# Patient Record
Sex: FEMALE | Race: WHITE | NOT HISPANIC OR LATINO | URBAN - METROPOLITAN AREA
[De-identification: names, ages, dates, MRNs, and addresses within clinical notes are randomized per-mention and may not be internally consistent; named-entity substitution may affect disease eponyms.]

---

## 2017-10-24 ENCOUNTER — APPOINTMENT (OUTPATIENT)
Dept: URBAN - METROPOLITAN AREA CLINIC 200 | Age: 27
Setting detail: DERMATOLOGY
End: 2017-10-26

## 2017-10-24 DIAGNOSIS — L30.4 ERYTHEMA INTERTRIGO: ICD-10-CM

## 2017-10-24 PROBLEM — D23.5 OTHER BENIGN NEOPLASM OF SKIN OF TRUNK: Status: ACTIVE | Noted: 2017-10-24

## 2017-10-24 PROCEDURE — 99213 OFFICE O/P EST LOW 20 MIN: CPT

## 2017-10-24 PROCEDURE — OTHER COUNSELING: OTHER

## 2017-10-24 ASSESSMENT — LOCATION SIMPLE DESCRIPTION DERM: LOCATION SIMPLE: RIGHT BUTTOCK

## 2017-10-24 ASSESSMENT — LOCATION ZONE DERM: LOCATION ZONE: TRUNK

## 2017-10-24 ASSESSMENT — LOCATION DETAILED DESCRIPTION DERM: LOCATION DETAILED: RIGHT MEDIAL BUTTOCK

## 2018-04-17 ENCOUNTER — TRANSCRIBE ORDERS (OUTPATIENT)
Dept: SCHEDULING | Age: 28
End: 2018-04-17

## 2018-04-17 DIAGNOSIS — O35.10X0 CHROMOSOMAL ABNORMALITY IN FETUS, AFFECTING MANAGEMENT OF MOTHER, WITH DELIVERY: ICD-10-CM

## 2018-04-17 DIAGNOSIS — Z36.0 SCREENING FOR CHROMOSOMAL ANOMALIES BY AMNIOCENTESIS: Primary | ICD-10-CM

## 2018-04-18 ENCOUNTER — HOSPITAL ENCOUNTER (OUTPATIENT)
Dept: PERINATAL CARE | Facility: HOSPITAL | Age: 28
Discharge: HOME | End: 2018-04-18
Attending: OBSTETRICS & GYNECOLOGY
Payer: COMMERCIAL

## 2018-04-18 VITALS — BODY MASS INDEX: 19.16 KG/M2 | HEIGHT: 65 IN | WEIGHT: 115 LBS

## 2018-04-18 DIAGNOSIS — Z3A.13 13 WEEKS GESTATION OF PREGNANCY: ICD-10-CM

## 2018-04-18 DIAGNOSIS — O35.10X0 CHROMOSOMAL ABNORMALITY IN FETUS, AFFECTING MANAGEMENT OF MOTHER, WITH DELIVERY: ICD-10-CM

## 2018-04-18 DIAGNOSIS — Z36.0 ENCOUNTER FOR ANTENATAL SCREENING FOR CHROMOSOMAL ANOMALIES (CODE): Primary | ICD-10-CM

## 2018-04-18 LAB
D AG BLD QL: POSITIVE
EXTERNAL ABO: NORMAL
HBV SURFACE AG SER QL: NONREACTIVE
RPR SER QL: NORMAL
RUBELLA IGG SCREEN: NORMAL

## 2018-04-18 PROCEDURE — 76813 OB US NUCHAL MEAS 1 GEST: CPT | Mod: TC

## 2018-05-29 ENCOUNTER — HOSPITAL ENCOUNTER (EMERGENCY)
Facility: HOSPITAL | Age: 28
Discharge: HOME | End: 2018-05-29
Attending: EMERGENCY MEDICINE
Payer: COMMERCIAL

## 2018-05-29 VITALS
TEMPERATURE: 99.3 F | HEART RATE: 80 BPM | DIASTOLIC BLOOD PRESSURE: 50 MMHG | SYSTOLIC BLOOD PRESSURE: 103 MMHG | RESPIRATION RATE: 19 BRPM | OXYGEN SATURATION: 100 %

## 2018-05-29 DIAGNOSIS — Z3A.19 19 WEEKS GESTATION OF PREGNANCY: ICD-10-CM

## 2018-05-29 DIAGNOSIS — R11.2 NON-INTRACTABLE VOMITING WITH NAUSEA, UNSPECIFIED VOMITING TYPE: Primary | ICD-10-CM

## 2018-05-29 LAB
ALBUMIN SERPL-MCNC: 3.4 G/DL (ref 3.4–5)
ALP SERPL-CCNC: 42 IU/L (ref 35–126)
ALT SERPL-CCNC: 21 IU/L (ref 11–54)
ANION GAP SERPL CALC-SCNC: 9 MEQ/L (ref 3–15)
AST SERPL-CCNC: 32 IU/L (ref 15–41)
BASOPHILS # BLD: 0.01 K/UL (ref 0.01–0.1)
BASOPHILS NFR BLD: 0.2 %
BILIRUB SERPL-MCNC: 0.8 MG/DL (ref 0.3–1.2)
BILIRUB UR QL STRIP.AUTO: NEGATIVE MG/DL
BUN SERPL-MCNC: 12 MG/DL (ref 8–20)
CALCIUM SERPL-MCNC: 8.9 MG/DL (ref 8.9–10.3)
CHLORIDE SERPL-SCNC: 100 MMOL/L (ref 98–109)
CLARITY UR REFRACT.AUTO: CLEAR
CO2 SERPL-SCNC: 22 MMOL/L (ref 22–32)
COLOR UR AUTO: YELLOW
CREAT SERPL-MCNC: 0.6 MG/DL (ref 0.6–1.1)
DIFFERENTIAL METHOD BLD: ABNORMAL
EOSINOPHIL # BLD: 0 K/UL (ref 0.04–0.36)
EOSINOPHIL NFR BLD: 0 %
ERYTHROCYTE [DISTWIDTH] IN BLOOD BY AUTOMATED COUNT: 13.1 % (ref 11.7–14.4)
GFR SERPL CREATININE-BSD FRML MDRD: >60 ML/MIN/1.73M*2
GLUCOSE SERPL-MCNC: 106 MG/DL (ref 70–99)
GLUCOSE UR STRIP.AUTO-MCNC: ABNORMAL MG/DL
HCG SERPL-ACNC: ABNORMAL MIU/ML
HCT VFR BLDCO AUTO: 41.2 % (ref 35–45)
HGB BLD-MCNC: 14.3 G/DL (ref 11.8–15.7)
HGB UR QL STRIP.AUTO: NEGATIVE
IMM GRANULOCYTES # BLD AUTO: 0.02 K/UL (ref 0–0.08)
IMM GRANULOCYTES NFR BLD AUTO: 0.4 %
KETONES UR STRIP.AUTO-MCNC: NEGATIVE MG/DL
LEUKOCYTE ESTERASE UR QL STRIP.AUTO: NEGATIVE
LYMPHOCYTES # BLD: 0.18 K/UL (ref 1.2–3.5)
LYMPHOCYTES NFR BLD: 3.3 %
MCH RBC QN AUTO: 31.8 PG (ref 28–33.2)
MCHC RBC AUTO-ENTMCNC: 34.7 G/DL (ref 32.2–35.5)
MCV RBC AUTO: 91.8 FL (ref 83–98)
MONOCYTES # BLD: 0.2 K/UL (ref 0.28–0.8)
MONOCYTES NFR BLD: 3.7 %
NEUTROPHILS # BLD: 5.06 K/UL (ref 1.7–7)
NEUTS SEG NFR BLD: 92.4 %
NITRITE UR QL STRIP.AUTO: NEGATIVE
NRBC BLD-RTO: 0 %
PDW BLD AUTO: 10.1 FL (ref 9.4–12.3)
PH UR STRIP.AUTO: 6.5 [PH]
PLATELET # BLD AUTO: 160 K/UL (ref 150–369)
POTASSIUM SERPL-SCNC: 3.6 MMOL/L (ref 3.6–5.1)
PROT SERPL-MCNC: 6.5 G/DL (ref 6–8.2)
PROT UR QL STRIP.AUTO: NEGATIVE
RBC # BLD AUTO: 4.49 M/UL (ref 3.93–5.22)
SODIUM SERPL-SCNC: 131 MMOL/L (ref 136–144)
SP GR UR REFRACT.AUTO: 1.01
UROBILINOGEN UR STRIP-ACNC: 0.2 EU/DL
WBC # BLD AUTO: 5.47 K/UL (ref 3.8–10.5)

## 2018-05-29 PROCEDURE — 85025 COMPLETE CBC W/AUTO DIFF WBC: CPT

## 2018-05-29 PROCEDURE — 81003 URINALYSIS AUTO W/O SCOPE: CPT | Performed by: EMERGENCY MEDICINE

## 2018-05-29 PROCEDURE — 63600000 HC DRUGS/DETAIL CODE: Performed by: STUDENT IN AN ORGANIZED HEALTH CARE EDUCATION/TRAINING PROGRAM

## 2018-05-29 PROCEDURE — 85025 COMPLETE CBC W/AUTO DIFF WBC: CPT | Performed by: EMERGENCY MEDICINE

## 2018-05-29 PROCEDURE — 84702 CHORIONIC GONADOTROPIN TEST: CPT | Mod: GZ | Performed by: EMERGENCY MEDICINE

## 2018-05-29 PROCEDURE — 99284 EMERGENCY DEPT VISIT MOD MDM: CPT

## 2018-05-29 PROCEDURE — 25800000 HC PHARMACY IV SOLUTIONS: Performed by: STUDENT IN AN ORGANIZED HEALTH CARE EDUCATION/TRAINING PROGRAM

## 2018-05-29 PROCEDURE — 80053 COMPREHEN METABOLIC PANEL: CPT

## 2018-05-29 PROCEDURE — 80053 COMPREHEN METABOLIC PANEL: CPT | Performed by: EMERGENCY MEDICINE

## 2018-05-29 PROCEDURE — 36415 COLL VENOUS BLD VENIPUNCTURE: CPT

## 2018-05-29 RX ORDER — ONDANSETRON 4 MG/1
4 TABLET, FILM COATED ORAL
Qty: 12 TABLET | Refills: 0 | Status: SHIPPED | OUTPATIENT
Start: 2018-05-29 | End: 2018-06-05

## 2018-05-29 RX ORDER — DEXTROSE MONOHYDRATE AND SODIUM CHLORIDE 5; .9 G/100ML; G/100ML
INJECTION, SOLUTION INTRAVENOUS ONCE
Status: COMPLETED | OUTPATIENT
Start: 2018-05-29 | End: 2018-05-29

## 2018-05-29 RX ADMIN — DEXTROSE AND SODIUM CHLORIDE: 5; 900 INJECTION, SOLUTION INTRAVENOUS at 16:35

## 2018-05-29 RX ADMIN — SODIUM CHLORIDE 1000 ML: 9 INJECTION, SOLUTION INTRAVENOUS at 17:28

## 2018-05-29 ASSESSMENT — ENCOUNTER SYMPTOMS
PALPITATIONS: 0
NAUSEA: 1
DYSURIA: 0
DIARRHEA: 0
HEMATURIA: 0
VOMITING: 1
BACK PAIN: 0
FEVER: 0
SHORTNESS OF BREATH: 0
SEIZURES: 0
MYALGIAS: 0
COLOR CHANGE: 0
ARTHRALGIAS: 0
CHILLS: 0
COUGH: 0
HEADACHES: 0
ABDOMINAL PAIN: 0
EYE PAIN: 0
SORE THROAT: 0

## 2018-05-29 NOTE — ED PROVIDER NOTES
HPI     Chief Complaint   Patient presents with   • Vomiting   • Nausea   • Headache       Pt is 19wks pregnant         History provided by:  Patient   used: No    Vomiting   Severity:  Moderate  Duration:  1 day  Timing:  Intermittent  Quality:  Stomach contents  Progression:  Improving  Chronicity:  New  Relieved by:  None tried  Worsened by:  Nothing  Ineffective treatments:  None tried  Associated symptoms: no abdominal pain, no arthralgias, no chills, no cough, no diarrhea, no fever, no headaches, no myalgias, no sore throat and no URI         Patient History     History reviewed. No pertinent past medical history.    History reviewed. No pertinent surgical history.    History reviewed. No pertinent family history.    Social History   Substance Use Topics   • Smoking status: Never Smoker   • Smokeless tobacco: Never Used   • Alcohol use Not on file       Systems Reviewed from Nursing Triage:          Review of Systems     Review of Systems   Constitutional: Negative for chills and fever.   HENT: Negative for ear pain and sore throat.    Eyes: Negative for pain and visual disturbance.   Respiratory: Negative for cough and shortness of breath.    Cardiovascular: Negative for chest pain and palpitations.   Gastrointestinal: Positive for nausea and vomiting. Negative for abdominal pain and diarrhea.   Genitourinary: Negative for dysuria and hematuria.   Musculoskeletal: Negative for arthralgias, back pain and myalgias.   Skin: Negative for color change and rash.   Neurological: Negative for seizures, syncope and headaches.   All other systems reviewed and are negative.       Physical Exam     ED Triage Vitals   Temp Heart Rate Resp BP SpO2   05/29/18 1402 05/29/18 1402 05/29/18 1402 05/29/18 1402 05/29/18 1402   36.8 °C (98.3 °F) (!) 101 18 (!) 93/55 99 %      Temp Source Heart Rate Source Patient Position BP Location FiO2 (%) (Set)   05/29/18 1757 05/29/18 1757 05/29/18 1757 05/29/18 1757 --    Oral Monitor Lying Right upper arm                      Patient Vitals for the past 24 hrs:   BP Temp Temp src Pulse Resp SpO2   05/29/18 1757 (!) 103/50 37.4 °C (99.3 °F) Oral 80 19 100 %   05/29/18 1402 (!) 93/55 36.8 °C (98.3 °F) - (!) 101 18 99 %           Physical Exam   Constitutional: She is oriented to person, place, and time. She appears well-developed and well-nourished. No distress.   HENT:   Head: Normocephalic and atraumatic.   Eyes: Conjunctivae are normal.   Neck: Neck supple.   Cardiovascular: Normal rate and regular rhythm.    No murmur heard.  Pulmonary/Chest: Effort normal and breath sounds normal. No respiratory distress.   Abdominal: Soft. There is no tenderness.   Musculoskeletal: She exhibits no edema.   Neurological: She is alert and oriented to person, place, and time.   Skin: Skin is warm and dry.   Psychiatric: She has a normal mood and affect.   Nursing note and vitals reviewed.           Procedures    ED Course & MDM     Labs Reviewed   COMPREHENSIVE METABOLIC PANEL - Abnormal        Result Value    Sodium 131 (*)     Glucose 106 (*)     Potassium 3.6      Chloride 100      CO2 22      BUN 12      Creatinine 0.6      Calcium 8.9      AST (SGOT) 32      ALT (SGPT) 21      Alkaline Phosphatase 42      Total Protein 6.5      Albumin 3.4      Bilirubin, Total 0.8      eGFR >60.0      Anion Gap 9     BHCG, SERUM, QUANT - Abnormal     hCG Quant 20,761.0 (*)    DIFF COUNT - Abnormal     Lymphocytes, Absolute 0.18 (*)     Monocytes, Absolute 0.20 (*)     Eosinophils, Absolute 0.00 (*)     Differential Type Auto      nRBC 0.0      Immature Granulocytes 0.4      Neutrophils 92.4      Lymphocytes 3.3      Monocytes 3.7      Eosinophils 0.0      Basophils 0.2      Immature Granulocytes, Absolute 0.02      Neutrophils, Absolute 5.06      Basophils, Absolute 0.01     UA REFLEX CULTURE (MACROSCOPIC) - Abnormal     Glucose, Urine 500 (LARGE) (*)     Color, Urine Yellow      Clarity, Urine Clear       Specific Gravity, Urine 1.015      pH, Urine 6.5      Leukocyte Esterase Negative      Nitrite, Urine Negative      Protein, Urine Negative      Ketones, Urine Negative      Urobilinogen, Urine 0.2      Bilirubin, Urine Negative      Blood, Urine Negative     CBC - Normal    WBC 5.47      RBC 4.49      Hemoglobin 14.3      Hematocrit 41.2      MCV 91.8      MCH 31.8      MCHC 34.7      RDW 13.1      Platelets 160      MPV 10.1     CBC AND DIFFERENTIAL    Narrative:     The following orders were created for panel order CBC and differential.  Procedure                               Abnormality         Status                     ---------                               -----------         ------                     CBC[[id number]]                           Normal              Final result               Diff Count[[id number]]                    Abnormal            Final result                 Please view results for these tests on the individual orders.   URINALYSIS REFLEX CULTURE    Narrative:     The following orders were created for panel order Urinalysis w/ reflex culture.  Procedure                               Abnormality         Status                     ---------                               -----------         ------                     UA Reflex to Culture (Mac...[[id number]]  Abnormal            Final result                 Please view results for these tests on the individual orders.       No orders to display           MDM         ED Course as of May 29 1841   Tue May 29, 2018   1840 Pt did not vomit while in the ER. Tolerated PO  [YODIT]      ED Course User Index  [YODIT] KAITLIN Mcnamara         Clinical Impressions as of May 29 1841   Non-intractable vomiting with nausea, unspecified vomiting type   19 weeks gestation of pregnancy     Disposition:  Discharge     KAITLIN Mcnamara  05/29/18 1841

## 2018-05-29 NOTE — ED ATTESTATION NOTE
The patient was evaluated and managed by the physician assistant / nurse practitioner.       Mario Albarado MD  05/29/18 5333

## 2018-06-07 ENCOUNTER — TRANSCRIBE ORDERS (OUTPATIENT)
Dept: SCHEDULING | Age: 28
End: 2018-06-07

## 2018-06-07 DIAGNOSIS — Z34.92 ENCOUNTER FOR SUPERVISION OF NORMAL PREGNANCY IN SECOND TRIMESTER: Primary | ICD-10-CM

## 2018-06-25 ENCOUNTER — HOSPITAL ENCOUNTER (OUTPATIENT)
Dept: PERINATAL CARE | Facility: HOSPITAL | Age: 28
Discharge: HOME | End: 2018-06-25
Attending: OBSTETRICS & GYNECOLOGY
Payer: COMMERCIAL

## 2018-06-25 DIAGNOSIS — Z3A.22 22 WEEKS GESTATION OF PREGNANCY: ICD-10-CM

## 2018-06-25 DIAGNOSIS — Z36.3 ANTENATAL SCREENING FOR MALFORMATION USING ULTRASONICS: Primary | ICD-10-CM

## 2018-06-25 DIAGNOSIS — O35.9XX0 SUSPECTED FETAL ABNORMALITY AFFECTING MANAGEMENT OF MOTHER, SINGLE OR UNSPECIFIED FETUS: ICD-10-CM

## 2018-06-25 PROCEDURE — 76805 OB US >/= 14 WKS SNGL FETUS: CPT

## 2018-06-28 ENCOUNTER — TRANSCRIBE ORDERS (OUTPATIENT)
Dept: SCHEDULING | Age: 28
End: 2018-06-28

## 2018-06-28 DIAGNOSIS — Z34.93 ENCOUNTER FOR SUPERVISION OF NORMAL PREGNANCY IN THIRD TRIMESTER: Primary | ICD-10-CM

## 2018-08-02 ENCOUNTER — TRANSCRIBE ORDERS (OUTPATIENT)
Dept: REGISTRATION | Facility: HOSPITAL | Age: 28
End: 2018-08-02

## 2018-08-02 ENCOUNTER — HOSPITAL ENCOUNTER (OUTPATIENT)
Dept: PERINATAL CARE | Facility: HOSPITAL | Age: 28
Discharge: HOME | End: 2018-08-02
Attending: OBSTETRICS & GYNECOLOGY
Payer: COMMERCIAL

## 2018-08-02 DIAGNOSIS — Z34.93 ENCOUNTER FOR SUPERVISION OF NORMAL PREGNANCY IN THIRD TRIMESTER: Primary | ICD-10-CM

## 2018-08-02 DIAGNOSIS — Z3A.28 28 WEEKS GESTATION OF PREGNANCY: ICD-10-CM

## 2018-08-02 DIAGNOSIS — O35.10X0 MATERNAL CARE FOR SUSPECTED CHROMOSOMAL ABNORMALITY IN FETUS: Primary | ICD-10-CM

## 2018-08-02 DIAGNOSIS — Z34.93 ENCOUNTER FOR SUPERVISION OF NORMAL PREGNANCY IN THIRD TRIMESTER: ICD-10-CM

## 2018-08-02 DIAGNOSIS — O36.5930 INTRAUTERINE GROWTH RETARDATION AFFECTING MOTHER, ANTEPARTUM, THIRD TRIMESTER, NOT APPLICABLE OR UNSPECIFIED FETUS: ICD-10-CM

## 2018-08-02 PROCEDURE — 76816 OB US FOLLOW-UP PER FETUS: CPT

## 2018-08-28 ENCOUNTER — LAB REQUISITION (OUTPATIENT)
Dept: LAB | Facility: HOSPITAL | Age: 28
End: 2018-08-28
Attending: OBSTETRICS & GYNECOLOGY
Payer: COMMERCIAL

## 2018-08-28 DIAGNOSIS — N39.0 URINARY TRACT INFECTION: ICD-10-CM

## 2018-08-28 PROCEDURE — 87086 URINE CULTURE/COLONY COUNT: CPT | Performed by: OBSTETRICS & GYNECOLOGY

## 2018-08-29 LAB — BACTERIA UR CULT: NORMAL

## 2018-08-30 ENCOUNTER — HOSPITAL ENCOUNTER (OUTPATIENT)
Dept: PERINATAL CARE | Facility: HOSPITAL | Age: 28
Discharge: HOME | End: 2018-08-30
Attending: OBSTETRICS & GYNECOLOGY
Payer: COMMERCIAL

## 2018-08-30 ENCOUNTER — TRANSCRIBE ORDERS (OUTPATIENT)
Dept: REGISTRATION | Facility: HOSPITAL | Age: 28
End: 2018-08-30

## 2018-08-30 VITALS — BODY MASS INDEX: 19.16 KG/M2 | HEIGHT: 65 IN | WEIGHT: 115 LBS

## 2018-08-30 DIAGNOSIS — O35.10X0 MATERNAL CARE FOR SUSPECTED CHROMOSOMAL ABNORMALITY IN FETUS: ICD-10-CM

## 2018-08-30 DIAGNOSIS — O36.5930 INTRAUTERINE GROWTH RETARDATION AFFECTING MOTHER, ANTEPARTUM, THIRD TRIMESTER, NOT APPLICABLE OR UNSPECIFIED FETUS: ICD-10-CM

## 2018-08-30 DIAGNOSIS — O34.219 PREVIOUS CESAREAN DELIVERY, ANTEPARTUM CONDITION OR COMPLICATION: ICD-10-CM

## 2018-08-30 DIAGNOSIS — Z3A.33 33 WEEKS GESTATION OF PREGNANCY: Primary | ICD-10-CM

## 2018-08-30 PROCEDURE — 76816 OB US FOLLOW-UP PER FETUS: CPT

## 2018-09-25 ENCOUNTER — LAB REQUISITION (OUTPATIENT)
Dept: LAB | Facility: HOSPITAL | Age: 28
End: 2018-09-25
Attending: OBSTETRICS & GYNECOLOGY
Payer: COMMERCIAL

## 2018-09-25 DIAGNOSIS — Z34.93 ENCOUNTER FOR SUPERVISION OF NORMAL PREGNANCY IN THIRD TRIMESTER: ICD-10-CM

## 2018-09-25 PROCEDURE — 87077 CULTURE AEROBIC IDENTIFY: CPT | Performed by: OBSTETRICS & GYNECOLOGY

## 2018-09-27 ENCOUNTER — TRANSCRIBE ORDERS (OUTPATIENT)
Dept: REGISTRATION | Facility: HOSPITAL | Age: 28
End: 2018-09-27

## 2018-09-27 ENCOUNTER — HOSPITAL ENCOUNTER (OUTPATIENT)
Dept: PERINATAL CARE | Facility: HOSPITAL | Age: 28
Discharge: HOME | End: 2018-09-27
Attending: OBSTETRICS & GYNECOLOGY
Payer: COMMERCIAL

## 2018-09-27 DIAGNOSIS — O36.5930 SMALL FOR GESTATIONAL AGE FETUS AFFECTING MANAGEMENT OF MOTHER, THIRD TRIMESTER, NOT APPLICABLE OR UNSPECIFIED FETUS: ICD-10-CM

## 2018-09-27 DIAGNOSIS — O36.5912 MATERNAL CARE FOR OTHER KNOWN OR SUSPECTED POOR FETAL GROWTH, FIRST TRIMESTER, FETUS 2: ICD-10-CM

## 2018-09-27 DIAGNOSIS — Z3A.32 32 WEEKS GESTATION OF PREGNANCY: ICD-10-CM

## 2018-09-27 DIAGNOSIS — O36.5912 MATERNAL CARE FOR OTHER KNOWN OR SUSPECTED POOR FETAL GROWTH, FIRST TRIMESTER, FETUS 2: Primary | ICD-10-CM

## 2018-09-27 DIAGNOSIS — Z03.74 ENCOUNTER FOR SUSPECTED PROBLEM WITH FETAL GROWTH, RULED OUT: ICD-10-CM

## 2018-09-27 DIAGNOSIS — O26.843 UTERINE SIZE-DATE DISCREPANCY IN THIRD TRIMESTER: ICD-10-CM

## 2018-09-27 DIAGNOSIS — Z03.74 ADMISSION FOR OBSERVATION OF SUSPECTED PROBLEM WITH FETAL GROWTH NOT FOUND: ICD-10-CM

## 2018-09-27 PROCEDURE — 76816 OB US FOLLOW-UP PER FETUS: CPT

## 2018-09-29 LAB
GP B STREP SPEC QL CULT: ABNORMAL
GP B STREP SPEC QL CULT: ABNORMAL

## 2018-10-18 PROBLEM — O34.219 MATERNAL CARE FOR SCAR FROM PREVIOUS CESAREAN DELIVERY: Status: ACTIVE | Noted: 2018-10-18

## 2018-10-18 RX ORDER — GABAPENTIN 300 MG/1
600 CAPSULE ORAL ONCE
Status: DISCONTINUED | OUTPATIENT
Start: 2018-10-18 | End: 2018-10-19

## 2018-10-18 RX ORDER — ONDANSETRON 8 MG/1
8 TABLET, ORALLY DISINTEGRATING ORAL ONCE
Status: ACTIVE | OUTPATIENT
Start: 2018-10-18 | End: 2018-10-19

## 2018-10-18 RX ORDER — SODIUM CHLORIDE, SODIUM LACTATE, POTASSIUM CHLORIDE, CALCIUM CHLORIDE 600; 310; 30; 20 MG/100ML; MG/100ML; MG/100ML; MG/100ML
1000 INJECTION, SOLUTION INTRAVENOUS ONCE
Status: COMPLETED | OUTPATIENT
Start: 2018-10-18 | End: 2018-10-19

## 2018-10-18 RX ORDER — ACETAMINOPHEN 650 MG/20.3ML
1000 LIQUID ORAL ONCE
Status: ACTIVE | OUTPATIENT
Start: 2018-10-18 | End: 2018-10-19

## 2018-10-18 RX ORDER — ACETAMINOPHEN 650 MG/1
650 SUPPOSITORY RECTAL ONCE
Status: ACTIVE | OUTPATIENT
Start: 2018-10-18 | End: 2018-10-19

## 2018-10-18 RX ORDER — CEFAZOLIN SODIUM/WATER 1 G/10 ML
2 SYRINGE (ML) INTRAVENOUS
Status: COMPLETED | OUTPATIENT
Start: 2018-10-18 | End: 2018-10-19

## 2018-10-18 RX ORDER — ACETAMINOPHEN 325 MG/1
975 TABLET ORAL ONCE
Status: ACTIVE | OUTPATIENT
Start: 2018-10-18 | End: 2018-10-19

## 2018-10-18 NOTE — H&P
HPI     Sayda Thomason is a 27 y.o. female  at 39w1d with an estimated due date of 10/24/2018, by Last Menstrual Period who presents for scheduled repeat c section.      ,     ,      OB History:   Obstetric History       T1      L1     SAB0   TAB0   Ectopic0   Multiple0   Live Births1       # Outcome Date GA Lbr Ron/2nd Weight Sex Delivery Anes PTL Lv   2 Current            1 Term                   Medical History: No past medical history on file.    Surgical History: No past surgical history on file.    Social History:   Social History     Social History   • Marital status: Single     Spouse name: N/A   • Number of children: N/A   • Years of education: N/A     Social History Main Topics   • Smoking status: Never Smoker   • Smokeless tobacco: Never Used   • Alcohol use Not on file   • Drug use: Unknown   • Sexual activity: Not on file     Other Topics Concern   • Not on file     Social History Narrative   • No narrative on file        Family History: No family history on file.    Allergies: Patient has no known allergies.    Prior to Admission medications    Not on File       Review of Systems  Pertinent items are noted in HPI.    Objective     Vital Signs for the last 24 hours:       Latest cervical exam: closed                Additional Tests:   Sterile Speculum Exam: no    Weekly NST for SGA, HC <3%, reassurring  Exam:  General Appearance: Alert, cooperative, no acute distress  Lungs: Clear to auscultation bilaterally, respirations unlabored  Heart: Regular rate and rhythm, S1 and S2 normal, no murmur, rub or gallop  Abdomen: gravid, nontender  Genitalia: See vaginal exam  Extremities: no edema or calf tenderness  Neurologic: grossly intact without focal deficits    Ultrasounds:   I have reviewed the applicable Ultrasounds.    Bedside Ultrasounds: 10/18   Cephalic  NANCY WNL    Labs:  No results found for: ABO, LABRH, RUBELLAIGGQT, GBS   NIPT Low risk  SMA neg  RPR Neg  Hgb  13.7  A+    Assessment/Plan     Sayda Thomason is a 27 y.o. female  at 39w1d admitted for      GBS: positive    LUIS FELIPE. Ej Mandel MD

## 2018-10-19 ENCOUNTER — ANESTHESIA EVENT (INPATIENT)
Dept: OBSTETRICS AND GYNECOLOGY | Facility: HOSPITAL | Age: 28
End: 2018-10-19
Payer: COMMERCIAL

## 2018-10-19 ENCOUNTER — ANESTHESIA (INPATIENT)
Dept: OBSTETRICS AND GYNECOLOGY | Facility: HOSPITAL | Age: 28
End: 2018-10-19
Payer: COMMERCIAL

## 2018-10-19 ENCOUNTER — HOSPITAL ENCOUNTER (INPATIENT)
Facility: HOSPITAL | Age: 28
LOS: 3 days | Discharge: HOME | End: 2018-10-22
Attending: OBSTETRICS & GYNECOLOGY | Admitting: OBSTETRICS & GYNECOLOGY
Payer: COMMERCIAL

## 2018-10-19 DIAGNOSIS — O34.211 MATERNAL CARE DUE TO LOW TRANSVERSE UTERINE SCAR FROM PREVIOUS CESAREAN DELIVERY: Primary | ICD-10-CM

## 2018-10-19 LAB
ABO + RH BLD: NORMAL
BLD GP AB SCN SERPL QL: NEGATIVE
D AG BLD QL: POSITIVE
ERYTHROCYTE [DISTWIDTH] IN BLOOD BY AUTOMATED COUNT: 12.3 % (ref 11.7–14.4)
HCT VFR BLDCO AUTO: 33.2 % (ref 35–45)
HGB BLD-MCNC: 11.9 G/DL (ref 11.8–15.7)
LABORATORY COMMENT REPORT: NORMAL
MCH RBC QN AUTO: 33.9 PG (ref 28–33.2)
MCHC RBC AUTO-ENTMCNC: 35.8 G/DL (ref 32.2–35.5)
MCV RBC AUTO: 94.6 FL (ref 83–98)
PDW BLD AUTO: 10.8 FL (ref 9.4–12.3)
PLATELET # BLD AUTO: 112 K/UL (ref 150–369)
RBC # BLD AUTO: 3.51 M/UL (ref 3.93–5.22)
RPR SER QL: NORMAL
WBC # BLD AUTO: 7.69 K/UL (ref 3.8–10.5)

## 2018-10-19 PROCEDURE — 63700000 HC SELF-ADMINISTRABLE DRUG: Performed by: OBSTETRICS & GYNECOLOGY

## 2018-10-19 PROCEDURE — 71000001 HC PACU PHASE 1 INITIAL 30MIN: Performed by: OBSTETRICS & GYNECOLOGY

## 2018-10-19 PROCEDURE — 63600000 HC DRUGS/DETAIL CODE: Performed by: ANESTHESIOLOGY

## 2018-10-19 PROCEDURE — 63600000 HC DRUGS/DETAIL CODE: Performed by: OBSTETRICS & GYNECOLOGY

## 2018-10-19 PROCEDURE — 37000010 HC ANESTHESIA SPINAL: Performed by: OBSTETRICS & GYNECOLOGY

## 2018-10-19 PROCEDURE — 86592 SYPHILIS TEST NON-TREP QUAL: CPT | Performed by: OBSTETRICS & GYNECOLOGY

## 2018-10-19 PROCEDURE — 86850 RBC ANTIBODY SCREEN: CPT

## 2018-10-19 PROCEDURE — 63700000 HC SELF-ADMINISTRABLE DRUG: Performed by: NURSE PRACTITIONER

## 2018-10-19 PROCEDURE — 71000011 HC PACU PHASE 1 EA ADDL MIN: Performed by: OBSTETRICS & GYNECOLOGY

## 2018-10-19 PROCEDURE — 85027 COMPLETE CBC AUTOMATED: CPT | Performed by: OBSTETRICS & GYNECOLOGY

## 2018-10-19 PROCEDURE — 12000000 HC ROOM AND CARE MED/SURG

## 2018-10-19 PROCEDURE — 72000021 HC C SECTION LEVEL 1: Performed by: OBSTETRICS & GYNECOLOGY

## 2018-10-19 PROCEDURE — 36415 COLL VENOUS BLD VENIPUNCTURE: CPT | Performed by: OBSTETRICS & GYNECOLOGY

## 2018-10-19 PROCEDURE — 25800000 HC PHARMACY IV SOLUTIONS: Performed by: OBSTETRICS & GYNECOLOGY

## 2018-10-19 PROCEDURE — 25000000 HC PHARMACY GENERAL: Performed by: ANESTHESIOLOGY

## 2018-10-19 PROCEDURE — 37000010 ANESTHESIA SPINAL BLOCK: Performed by: ANESTHESIOLOGY

## 2018-10-19 PROCEDURE — 63600000 HC DRUGS/DETAIL CODE: Performed by: NURSE PRACTITIONER

## 2018-10-19 RX ORDER — HYDROMORPHONE HYDROCHLORIDE 1 MG/ML
0.5 INJECTION, SOLUTION INTRAMUSCULAR; INTRAVENOUS; SUBCUTANEOUS
Status: DISCONTINUED | OUTPATIENT
Start: 2018-10-19 | End: 2018-10-19 | Stop reason: HOSPADM

## 2018-10-19 RX ORDER — PROCHLORPERAZINE EDISYLATE 5 MG/ML
10 INJECTION INTRAMUSCULAR; INTRAVENOUS EVERY 6 HOURS PRN
Status: DISCONTINUED | OUTPATIENT
Start: 2018-10-19 | End: 2018-10-22 | Stop reason: HOSPADM

## 2018-10-19 RX ORDER — ONDANSETRON 4 MG/1
4 TABLET, ORALLY DISINTEGRATING ORAL EVERY 8 HOURS PRN
Status: DISCONTINUED | OUTPATIENT
Start: 2018-10-19 | End: 2018-10-22 | Stop reason: HOSPADM

## 2018-10-19 RX ORDER — SODIUM CHLORIDE, SODIUM LACTATE, POTASSIUM CHLORIDE, CALCIUM CHLORIDE 600; 310; 30; 20 MG/100ML; MG/100ML; MG/100ML; MG/100ML
80 INJECTION, SOLUTION INTRAVENOUS CONTINUOUS
Status: DISCONTINUED | OUTPATIENT
Start: 2018-10-19 | End: 2018-10-22 | Stop reason: HOSPADM

## 2018-10-19 RX ORDER — OXYTOCIN/0.9 % SODIUM CHLORIDE 20/1000 ML
PLASTIC BAG, INJECTION (ML) INTRAVENOUS CONTINUOUS PRN
Status: DISCONTINUED | OUTPATIENT
Start: 2018-10-19 | End: 2018-10-19 | Stop reason: SURG

## 2018-10-19 RX ORDER — AMOXICILLIN 250 MG
1 CAPSULE ORAL 2 TIMES DAILY
Status: DISCONTINUED | OUTPATIENT
Start: 2018-10-19 | End: 2018-10-22 | Stop reason: HOSPADM

## 2018-10-19 RX ORDER — GLYCOPYRROLATE 0.6MG/3ML
SYRINGE (ML) INTRAVENOUS AS NEEDED
Status: DISCONTINUED | OUTPATIENT
Start: 2018-10-19 | End: 2018-10-19 | Stop reason: SURG

## 2018-10-19 RX ORDER — NALOXONE HYDROCHLORIDE 0.4 MG/ML
0.4 INJECTION, SOLUTION INTRAMUSCULAR; INTRAVENOUS; SUBCUTANEOUS AS NEEDED
Status: ACTIVE | OUTPATIENT
Start: 2018-10-19 | End: 2018-10-21

## 2018-10-19 RX ORDER — ONDANSETRON 8 MG/1
8 TABLET, ORALLY DISINTEGRATING ORAL ONCE
Status: COMPLETED | OUTPATIENT
Start: 2018-10-19 | End: 2018-10-19

## 2018-10-19 RX ORDER — METOCLOPRAMIDE HYDROCHLORIDE 5 MG/ML
10 INJECTION INTRAMUSCULAR; INTRAVENOUS EVERY 6 HOURS PRN
Status: DISCONTINUED | OUTPATIENT
Start: 2018-10-19 | End: 2018-10-22 | Stop reason: HOSPADM

## 2018-10-19 RX ORDER — DIPHENHYDRAMINE HCL 50 MG/ML
25 VIAL (ML) INJECTION EVERY 6 HOURS PRN
Status: DISCONTINUED | OUTPATIENT
Start: 2018-10-19 | End: 2018-10-22 | Stop reason: HOSPADM

## 2018-10-19 RX ORDER — ALUMINUM HYDROXIDE, MAGNESIUM HYDROXIDE, AND SIMETHICONE 1200; 120; 1200 MG/30ML; MG/30ML; MG/30ML
30 SUSPENSION ORAL EVERY 4 HOURS PRN
Status: DISCONTINUED | OUTPATIENT
Start: 2018-10-19 | End: 2018-10-22 | Stop reason: HOSPADM

## 2018-10-19 RX ORDER — MORPHINE SULFATE 0.5 MG/ML
INJECTION, SOLUTION EPIDURAL; INTRATHECAL; INTRAVENOUS AS NEEDED
Status: DISCONTINUED | OUTPATIENT
Start: 2018-10-19 | End: 2018-10-19 | Stop reason: SURG

## 2018-10-19 RX ORDER — DEXTROSE 50 % IN WATER (D50W) INTRAVENOUS SYRINGE
25 AS NEEDED
Status: DISCONTINUED | OUTPATIENT
Start: 2018-10-19 | End: 2018-10-19 | Stop reason: HOSPADM

## 2018-10-19 RX ORDER — ACETAMINOPHEN 325 MG/1
975 TABLET ORAL ONCE
Status: COMPLETED | OUTPATIENT
Start: 2018-10-19 | End: 2018-10-19

## 2018-10-19 RX ORDER — FENTANYL CITRATE 50 UG/ML
50 INJECTION, SOLUTION INTRAMUSCULAR; INTRAVENOUS
Status: DISCONTINUED | OUTPATIENT
Start: 2018-10-19 | End: 2018-10-19 | Stop reason: HOSPADM

## 2018-10-19 RX ORDER — METOCLOPRAMIDE 10 MG/1
10 TABLET ORAL EVERY 6 HOURS PRN
Status: DISCONTINUED | OUTPATIENT
Start: 2018-10-19 | End: 2018-10-22 | Stop reason: HOSPADM

## 2018-10-19 RX ORDER — DIPHENHYDRAMINE HCL 50 MG/ML
12.5 VIAL (ML) INJECTION EVERY 6 HOURS PRN
Status: DISCONTINUED | OUTPATIENT
Start: 2018-10-19 | End: 2018-10-22 | Stop reason: HOSPADM

## 2018-10-19 RX ORDER — ONDANSETRON HYDROCHLORIDE 2 MG/ML
4 INJECTION, SOLUTION INTRAVENOUS EVERY 6 HOURS PRN
Status: DISCONTINUED | OUTPATIENT
Start: 2019-01-17 | End: 2018-10-22 | Stop reason: HOSPADM

## 2018-10-19 RX ORDER — BUPIVACAINE HYDROCHLORIDE 7.5 MG/ML
INJECTION, SOLUTION INTRASPINAL AS NEEDED
Status: DISCONTINUED | OUTPATIENT
Start: 2018-10-19 | End: 2018-10-19 | Stop reason: SURG

## 2018-10-19 RX ORDER — IBUPROFEN 600 MG/1
600 TABLET ORAL
Status: DISCONTINUED | OUTPATIENT
Start: 2018-10-21 | End: 2018-10-19

## 2018-10-19 RX ORDER — ACETAMINOPHEN 650 MG/20.3ML
1000 LIQUID ORAL ONCE
Status: COMPLETED | OUTPATIENT
Start: 2018-10-19 | End: 2018-10-19

## 2018-10-19 RX ORDER — ONDANSETRON HYDROCHLORIDE 2 MG/ML
4 INJECTION, SOLUTION INTRAVENOUS EVERY 8 HOURS PRN
Status: DISCONTINUED | OUTPATIENT
Start: 2018-10-19 | End: 2018-10-22 | Stop reason: HOSPADM

## 2018-10-19 RX ORDER — DIBUCAINE 1 %
1 OINTMENT (GRAM) TOPICAL AS NEEDED
Status: DISCONTINUED | OUTPATIENT
Start: 2018-10-19 | End: 2018-10-22 | Stop reason: HOSPADM

## 2018-10-19 RX ORDER — SODIUM CHLORIDE, SODIUM LACTATE, POTASSIUM CHLORIDE, CALCIUM CHLORIDE 600; 310; 30; 20 MG/100ML; MG/100ML; MG/100ML; MG/100ML
1000 INJECTION, SOLUTION INTRAVENOUS ONCE
Status: COMPLETED | OUTPATIENT
Start: 2018-10-19 | End: 2018-10-19

## 2018-10-19 RX ORDER — OXYCODONE HYDROCHLORIDE 5 MG/1
5-10 TABLET ORAL EVERY 4 HOURS PRN
Status: DISCONTINUED | OUTPATIENT
Start: 2018-10-19 | End: 2018-10-22 | Stop reason: HOSPADM

## 2018-10-19 RX ORDER — NALOXONE HYDROCHLORIDE 0.4 MG/ML
0.1 INJECTION, SOLUTION INTRAMUSCULAR; INTRAVENOUS; SUBCUTANEOUS
Status: DISCONTINUED | OUTPATIENT
Start: 2018-10-19 | End: 2018-10-22 | Stop reason: HOSPADM

## 2018-10-19 RX ORDER — OXYTOCIN/0.9 % SODIUM CHLORIDE 20/1000 ML
125 PLASTIC BAG, INJECTION (ML) INTRAVENOUS CONTINUOUS
Status: DISPENSED | OUTPATIENT
Start: 2018-10-19 | End: 2018-10-19

## 2018-10-19 RX ORDER — IBUPROFEN 200 MG
16-32 TABLET ORAL AS NEEDED
Status: DISCONTINUED | OUTPATIENT
Start: 2018-10-19 | End: 2018-10-19 | Stop reason: HOSPADM

## 2018-10-19 RX ORDER — LANOLIN
1 WAX (GRAM) MISCELLANEOUS AS NEEDED
Status: DISCONTINUED | OUTPATIENT
Start: 2018-10-19 | End: 2018-10-22 | Stop reason: HOSPADM

## 2018-10-19 RX ORDER — PHENYLEPHRINE HYDROCHLORIDE 10 MG/ML
INJECTION INTRAVENOUS AS NEEDED
Status: DISCONTINUED | OUTPATIENT
Start: 2018-10-19 | End: 2018-10-19 | Stop reason: SURG

## 2018-10-19 RX ORDER — DIPHENHYDRAMINE HCL 25 MG
25 CAPSULE ORAL EVERY 6 HOURS PRN
Status: DISCONTINUED | OUTPATIENT
Start: 2018-10-19 | End: 2018-10-22 | Stop reason: HOSPADM

## 2018-10-19 RX ORDER — ACETAMINOPHEN 325 MG/1
975 TABLET ORAL
Status: DISCONTINUED | OUTPATIENT
Start: 2018-10-19 | End: 2018-10-22 | Stop reason: HOSPADM

## 2018-10-19 RX ORDER — ACETAMINOPHEN 650 MG/1
650 SUPPOSITORY RECTAL ONCE
Status: COMPLETED | OUTPATIENT
Start: 2018-10-19 | End: 2018-10-19

## 2018-10-19 RX ORDER — IBUPROFEN 600 MG/1
600 TABLET ORAL
Status: DISCONTINUED | OUTPATIENT
Start: 2018-10-21 | End: 2018-10-22 | Stop reason: HOSPADM

## 2018-10-19 RX ORDER — KETOROLAC TROMETHAMINE 30 MG/ML
30 INJECTION, SOLUTION INTRAMUSCULAR; INTRAVENOUS
Status: COMPLETED | OUTPATIENT
Start: 2018-10-19 | End: 2018-10-21

## 2018-10-19 RX ORDER — CALCIUM CARBONATE 200(500)MG
500 TABLET,CHEWABLE ORAL EVERY 4 HOURS PRN
Status: DISCONTINUED | OUTPATIENT
Start: 2018-10-19 | End: 2018-10-22 | Stop reason: HOSPADM

## 2018-10-19 RX ORDER — MIDAZOLAM HYDROCHLORIDE 2 MG/2ML
INJECTION, SOLUTION INTRAMUSCULAR; INTRAVENOUS AS NEEDED
Status: DISCONTINUED | OUTPATIENT
Start: 2018-10-19 | End: 2018-10-19 | Stop reason: SURG

## 2018-10-19 RX ORDER — ONDANSETRON 4 MG/1
4 TABLET, ORALLY DISINTEGRATING ORAL EVERY 6 HOURS PRN
Status: DISCONTINUED | OUTPATIENT
Start: 2018-10-19 | End: 2018-10-22 | Stop reason: HOSPADM

## 2018-10-19 RX ORDER — SODIUM CITRATE AND CITRIC ACID MONOHYDRATE 334; 500 MG/5ML; MG/5ML
30 SOLUTION ORAL ONCE
Status: COMPLETED | OUTPATIENT
Start: 2018-10-19 | End: 2018-10-19

## 2018-10-19 RX ORDER — KETOROLAC TROMETHAMINE 30 MG/ML
30 INJECTION, SOLUTION INTRAMUSCULAR; INTRAVENOUS
Status: DISCONTINUED | OUTPATIENT
Start: 2018-10-19 | End: 2018-10-19

## 2018-10-19 RX ORDER — ONDANSETRON HYDROCHLORIDE 2 MG/ML
4 INJECTION, SOLUTION INTRAVENOUS
Status: DISCONTINUED | OUTPATIENT
Start: 2018-10-19 | End: 2018-10-19 | Stop reason: HOSPADM

## 2018-10-19 RX ORDER — DEXTROSE 40 %
15-30 GEL (GRAM) ORAL AS NEEDED
Status: DISCONTINUED | OUTPATIENT
Start: 2018-10-19 | End: 2018-10-19 | Stop reason: HOSPADM

## 2018-10-19 RX ADMIN — SODIUM CHLORIDE, SODIUM LACTATE, POTASSIUM CHLORIDE, CALCIUM CHLORIDE: 600; 310; 30; 20 INJECTION, SOLUTION INTRAVENOUS at 09:36

## 2018-10-19 RX ADMIN — MIDAZOLAM HYDROCHLORIDE 1 MG: 1 INJECTION, SOLUTION INTRAMUSCULAR; INTRAVENOUS at 09:30

## 2018-10-19 RX ADMIN — KETOROLAC TROMETHAMINE 30 MG: 30 INJECTION, SOLUTION INTRAMUSCULAR at 11:41

## 2018-10-19 RX ADMIN — ACETAMINOPHEN 975 MG: 325 TABLET ORAL at 20:05

## 2018-10-19 RX ADMIN — PHENYLEPHRINE HYDROCHLORIDE 50 MCG: 10 INJECTION INTRAVENOUS at 09:59

## 2018-10-19 RX ADMIN — GLYCOPYRROLATE 0.2 MG: 0.2 INJECTION INTRAMUSCULAR; INTRAVENOUS at 09:30

## 2018-10-19 RX ADMIN — CEFAZOLIN SODIUM 2 G: 100 INJECTION, SOLUTION INTRAVENOUS at 09:12

## 2018-10-19 RX ADMIN — SODIUM CHLORIDE, POTASSIUM CHLORIDE, SODIUM LACTATE AND CALCIUM CHLORIDE 1000 ML: 600; 310; 30; 20 INJECTION, SOLUTION INTRAVENOUS at 09:12

## 2018-10-19 RX ADMIN — BUPIVACAINE HYDROCHLORIDE IN DEXTROSE 1.6 ML: 7.5 INJECTION, SOLUTION SUBARACHNOID at 09:32

## 2018-10-19 RX ADMIN — ACETAMINOPHEN 975 MG: 325 TABLET ORAL at 08:27

## 2018-10-19 RX ADMIN — MORPHINE SULFATE 0.2 MG: 0.5 INJECTION, SOLUTION EPIDURAL; INTRATHECAL; INTRAVENOUS at 09:32

## 2018-10-19 RX ADMIN — ONDANSETRON 4 MG: 2 INJECTION INTRAMUSCULAR; INTRAVENOUS at 12:00

## 2018-10-19 RX ADMIN — ONDANSETRON 8 MG: 8 TABLET, ORALLY DISINTEGRATING ORAL at 08:29

## 2018-10-19 RX ADMIN — KETOROLAC TROMETHAMINE 30 MG: 30 INJECTION, SOLUTION INTRAMUSCULAR at 18:04

## 2018-10-19 RX ADMIN — PHENYLEPHRINE HYDROCHLORIDE 100 MCG: 10 INJECTION INTRAVENOUS at 09:44

## 2018-10-19 RX ADMIN — PHENYLEPHRINE HYDROCHLORIDE 50 MCG: 10 INJECTION INTRAVENOUS at 09:45

## 2018-10-19 RX ADMIN — Medication: at 09:55

## 2018-10-19 RX ADMIN — PROMETHAZINE HYDROCHLORIDE 12.5 MG: 25 INJECTION INTRAMUSCULAR; INTRAVENOUS at 14:20

## 2018-10-19 RX ADMIN — SODIUM CITRATE AND CITRIC ACID MONOHYDRATE 30 ML: 500; 334 SOLUTION ORAL at 08:29

## 2018-10-19 ASSESSMENT — PAIN SCALES - GENERAL: PAIN_LEVEL: 0

## 2018-10-19 NOTE — ANESTHESIA PREPROCEDURE EVALUATION
"        Anesthesia ROS/MED HX    Anesthesia History - neg  Pulmonary - neg  Neuro/Psych - neg  Cardiovascular- neg  Hematological - neg  GI/Hepatic- neg  Musculoskeletal- neg  Renal Disease- neg  Endo/Other- neg  Body Habitus: Normal      Relevant Problems   No relevant active problems     I have reviewed the following records for  Sayda Thomason.    Lab Results   Component Value Date    WBC 5.47 2018    HGB 14.3 2018    HCT 41.2 2018    MCV 91.8 2018     2018     Lab Results   Component Value Date    GLUCOSE 106 (H) 2018    CALCIUM 8.9 2018     (L) 2018    K 3.6 2018    CO2 22 2018     2018    BUN 12 2018    CREATININE 0.6 2018     Lab Results   Component Value Date    HCGQUANT 20,761.0 (H) 2018         Current Facility-Administered Medications   Medication Dose Route Frequency Provider Last Rate Last Dose   • ceFAZolin in sterile water (ANCEF) injection 2 g  2 g intravenous 60 min Pre-Op JAMAAL Mandel III, MD       • lactated ringer's infusion 1,000 mL  1,000 mL intravenous Once Olga Ramos CRNP         Prior to Admission medications    Medication Sig Start Date End Date Taking? Authorizing Provider   prenatal vits96/iron fum/folic (PRE-KOBI VITAMIN) 27 mg iron- 800 mcg tablet Take 1 tablet by mouth daily.   Yes Provider, Gloria, MD Tom Burr MD        Physical Exam    Airway   Mallampati: I   TM distance: <3 FB   Neck ROM: full  Cardiovascular - normal   Rhythm: regular   Rate: normal  Pulmonary - normal   clear to auscultation  Other Findings   Back - neg   landmarks identified    Dental - normal      Blood pressure 125/84, pulse 65, height 1.651 m (5' 5\"), weight 54.4 kg (120 lb), last menstrual period 2018.      Anesthesia Plan    Plan: spinal   ASA 1  Blood Products:     Use of Blood Products Discussed: Yes   Anesthetic plan and risks discussed with: patient and " spouse  Postop Plan:   Patient Disposition: inpatient floor planned admission   Pain Management: spinal and IV analgesics

## 2018-10-19 NOTE — OP NOTE
Section Procedure Note    Indications: previous uterine incision low transverse    Pre-operative Diagnosis: 39 week 2 day pregnancy.    Post-operative Diagnosis: same    Surgeon:JAMAAL Mandel III, MD      Assistants: Antonella Mandel MD    Anesthesia: Spinal anesthesia    ASA Class: 2    Procedure Details   The patient was seen in the Holding Room. The risks, benefits, complications, treatment options, and expected outcomes were discussed with the patient.  The patient concurred with the proposed plan, giving informed consent.  The site of surgery properly noted/marked. The patient was taken to Operating Room, identified as Sayda Thomason and the procedure verified as  Delivery. A Time Out was held and the above information confirmed.    After induction of anesthesia, the patient was draped and prepped in the usual sterile manner. A Pfannenstiel incision was made just inferior to the old scar and carried down through the subcutaneous tissue to the fascia. Fascial incision was made and extended transversely. The fascia was  from the underlying rectus tissue superiorly and inferiorly. The peritoneum was identified and entered. Peritoneal incision was extended longitudinally. The utero-vesical peritoneal reflection was incised transversely and the bladder flap was bluntly freed from the lower uterine segment. A low transverse uterine incision was made and the uterus entered bluntly. Delivered from [facility] presentation was a 3060 gram female  with Apgar scores of 8 at one minute and 8 at five minutes. After the umbilical cord was clamped and cut cord blood was obtained for evaluation. The placenta was removed intact and appeared normal and appropriate size for the fetus. The uterine outline, tubes and ovaries appeared normal. The uterine incision was closed with running locked sutures of 1 Chromic. Antonella was placed over the bladder flap for excellent hemostasis. Hemostasis was then  observed. Lavage was carried out until clear. The fascia was then reapproximated with running sutures of 0 Vicryl. At this point the old scar was sharply excised then the  Sub Q was closed with 0 chromic The skin was reapproximated with monocryl suture and steri strips. Excellent result and hemostasis noted.     Instrument, sponge, and needle counts were correct prior the abdominal closure and at the conclusion of the case.     Findings:  Liveborn female     Estimated Blood Loss:  700           Drains: shelton           Total IV Fluids: LR to hydrate           Specimens: none           Implants: none           Complications:  None; patient tolerated the procedure well.           Disposition: PACU - hemodynamically stable.           Condition: stable    Attending Attestation: I was present and scrubbed for the entire procedure.

## 2018-10-19 NOTE — ANESTHESIA POSTPROCEDURE EVALUATION
Patient: Sayda Thomason    Procedure Summary     Date:  10/19/18 Room / Location:  [facility] / [facility] OR    Anesthesia Start:  0928 Anesthesia Stop:  1044    Procedure:  Repeat c section (N/A ) Diagnosis:  (pregnancy)    Surgeon:  JAMAAL Mandel III, MD Responsible Provider:  Tom Burr MD    Anesthesia Type:  spinal ASA Status:  1          Anesthesia Type: spinal  PACU Vitals  10/19/2018 1033 - 10/19/2018 1045      10/19/2018 1038 10/19/2018 1039 10/19/2018 1043       BP: - (!)  114/57 -     Pulse: 92 100 89     SpO2: 98 % - 99 %             Anesthesia Post Evaluation    Pain score: 0  Pain management: adequate  Patient location during evaluation: PACU  Patient participation: complete - patient participated  Level of consciousness: awake and alert  Cardiovascular status: acceptable  Airway Patency: adequate  Respiratory status: acceptable  Hydration status: acceptable  Pain well controlled after spinal preservative free morphine administration: Yes  Continue 24 hours observation after preservative free morphine administration: Yes  Anesthetic complications: no

## 2018-10-19 NOTE — PROGRESS NOTES
Obstetrics Postpartum Progress Note    Events  Patient seen and examined. Denies HA/CP/SOB. Patient had 2 episodes of emesis post-op that resolved with phenergan. Denies nausea or vomiting currently. Pain well controlled.    Subjective  Pain: controlled  Bleeding: lochia minimal  Diet: taking regular diet  Voiding: shelton in place  Bowel: no flatus  Ambulating: not ambulating    Vitals  Temp:  [35.6 °C (96.1 °F)-36.6 °C (97.8 °F)] 36.4 °C (97.5 °F)  Heart Rate:  [] 69  Resp:  [16-18] 18  BP: ()/(48-84) 122/68      Physical Exam  General: Well  Heart: Regular rate and rhythm  Lungs: Clear to auscultation bilaterally  Abdomen: positive bowel sounds soft, nondistended, appropriately TTP  Fundus: firm, at the umbilicus  Incision: Dressing clean,dry  Extremities: no edema    Labs  Labs Reviewed:  Lab Results   Component Value Date    ABO A 10/19/2018    LABRH Positive 10/19/2018      CBC Results       10/19/18 05/29/18 07/30/16                    0824 1453 0445         WBC 7.69 5.47 10.87 (H)         RBC 3.51 (L) 4.49 3.52 (L)         HGB 11.9 14.3 10.7 (L)         HCT 33.2 (L) 41.2 32.1 (L)         MCV 94.6 91.8 91.2         MCH 33.9 (H) 31.8 30.4         MCHC 35.8 (H) 34.7 33.3          (L) 160 111 (L)                       Rubella: immune    Assessment/Plan   Problem-based Assessment and Plan    Sayda Thomason is a 27 y.o.  postop day 0 s/p , Low Vertical .    1. Vital Signs: patient initially bradycardic, hypotensive with a temp of 96.1 post-op. improving with IV fluids at 125cc/hr  2. Hemodynamics: CBC pending Hgb 11.9 pre-op. No signs or symptoms of acute anemia   3. Pain: controlled  4. VTE Assessment: Early Ambulation, SCDs  5. Vaccinations/Rhogam: rhogam not indicated   6. Post care: meeting all goals Continue routine post op care.    Vanessa Turner MD

## 2018-10-19 NOTE — ANESTHESIA PROCEDURE NOTES
Spinal Block    Patient location during procedure: OR  Start time: 10/19/2018 9:33 AM  End time: 10/19/2018 9:35 AM  Staffing  Anesthesiologist: RAMBO TRIPLETT  Performed: anesthesiologist   Reason for block: at surgeon's request  Preanesthetic Checklist  Completed: patient identified, surgical consent, pre-op evaluation, timeout performed, IV checked, risks and benefits discussed, monitors and equipment checked and sterile field maintained during procedure  Spinal Block  Patient position: sitting  Prep: ChloraPrep and site prepped and draped  Patient monitoring: heart rate, cardiac monitor, continuous pulse ox and blood pressure  Approach: midline  Location: L3-4  Injection technique: single-shot  Needle  Needle type: Sprotte   Needle gauge: 24 G  Needle length: 3.5 in  Needle insertion depth: 5 cm  Assessment  Events: cerebrospinal fluid  Additional Notes  Procedure well tolerated. Vital signs stable.

## 2018-10-20 LAB
ERYTHROCYTE [DISTWIDTH] IN BLOOD BY AUTOMATED COUNT: 12.8 % (ref 11.7–14.4)
HCT VFR BLDCO AUTO: 28.2 % (ref 35–45)
HGB BLD-MCNC: 10.1 G/DL (ref 11.8–15.7)
MCH RBC QN AUTO: 34.2 PG (ref 28–33.2)
MCHC RBC AUTO-ENTMCNC: 35.8 G/DL (ref 32.2–35.5)
MCV RBC AUTO: 95.6 FL (ref 83–98)
PDW BLD AUTO: 10.5 FL (ref 9.4–12.3)
PLATELET # BLD AUTO: 88 K/UL (ref 150–369)
RBC # BLD AUTO: 2.95 M/UL (ref 3.93–5.22)
WBC # BLD AUTO: 7.6 K/UL (ref 3.8–10.5)

## 2018-10-20 PROCEDURE — 85027 COMPLETE CBC AUTOMATED: CPT | Performed by: OBSTETRICS & GYNECOLOGY

## 2018-10-20 PROCEDURE — 63700000 HC SELF-ADMINISTRABLE DRUG: Performed by: OBSTETRICS & GYNECOLOGY

## 2018-10-20 PROCEDURE — 12000000 HC ROOM AND CARE MED/SURG

## 2018-10-20 PROCEDURE — 63600000 HC DRUGS/DETAIL CODE: Performed by: OBSTETRICS & GYNECOLOGY

## 2018-10-20 PROCEDURE — 36415 COLL VENOUS BLD VENIPUNCTURE: CPT | Performed by: OBSTETRICS & GYNECOLOGY

## 2018-10-20 RX ADMIN — ACETAMINOPHEN 975 MG: 325 TABLET ORAL at 03:10

## 2018-10-20 RX ADMIN — ACETAMINOPHEN 975 MG: 325 TABLET ORAL at 15:32

## 2018-10-20 RX ADMIN — ACETAMINOPHEN 975 MG: 325 TABLET ORAL at 09:43

## 2018-10-20 RX ADMIN — KETOROLAC TROMETHAMINE 30 MG: 30 INJECTION, SOLUTION INTRAMUSCULAR at 12:24

## 2018-10-20 RX ADMIN — ACETAMINOPHEN 975 MG: 325 TABLET ORAL at 21:01

## 2018-10-20 RX ADMIN — ALUMINUM HYDROXIDE, MAGNESIUM HYDROXIDE, AND SIMETHICONE 30 ML: 200; 200; 20 SUSPENSION ORAL at 22:55

## 2018-10-20 RX ADMIN — PRENATAL VIT W/ FE FUMARATE-FA TAB 27-0.8 MG 1 TABLET: 27-0.8 TAB at 09:43

## 2018-10-20 RX ADMIN — OXYCODONE HYDROCHLORIDE 5 MG: 5 TABLET ORAL at 22:52

## 2018-10-20 RX ADMIN — KETOROLAC TROMETHAMINE 30 MG: 30 INJECTION, SOLUTION INTRAMUSCULAR at 18:35

## 2018-10-20 RX ADMIN — SENNOSIDES AND DOCUSATE SODIUM 1 TABLET: 8.6; 5 TABLET ORAL at 21:01

## 2018-10-20 RX ADMIN — KETOROLAC TROMETHAMINE 30 MG: 30 INJECTION, SOLUTION INTRAMUSCULAR at 06:35

## 2018-10-20 RX ADMIN — SENNOSIDES AND DOCUSATE SODIUM 1 TABLET: 8.6; 5 TABLET ORAL at 09:43

## 2018-10-20 RX ADMIN — KETOROLAC TROMETHAMINE 30 MG: 30 INJECTION, SOLUTION INTRAMUSCULAR at 00:22

## 2018-10-20 NOTE — PLAN OF CARE
Problem: Postpartum ( Delivery) (Adult,Obstetrics,Pediatric)  Intervention: Monitor/Manage Pain   10/20/18 0328   Manage Acute Burn Pain   Bowel Intervention adequate fluid intake promoted   Pain Management Interventions around-the-clock dosing utilized;cold applied;medication administered;pain management plan reviewed with patient/caregiver;pillow support provided;quiet environment facilitated

## 2018-10-20 NOTE — PLAN OF CARE
Problem: Patient Care Overview  Goal: Plan of Care Review  Outcome: Ongoing (interventions implemented as appropriate)   10/20/18 1811   Coping/Psychosocial   Plan Of Care Reviewed With patient   Plan of Care Review   Progress improving       Problem: Breastfeeding (Adult,Obstetrics,Pediatric)  Goal: Signs and Symptoms of Listed Potential Problems Will be Absent, Minimized or Managed (Breastfeeding)  Outcome: Ongoing (interventions implemented as appropriate)   10/20/18 1811   Breastfeeding   Problems Assessed (Breastfeeding) all   Problems Present (Breastfeeding) none       Problem: Postpartum ( Delivery) (Adult,Obstetrics,Pediatric)  Goal: Signs and Symptoms of Listed Potential Problems Will be Absent, Minimized or Managed (Postpartum)  Outcome: Ongoing (interventions implemented as appropriate)   10/20/18 1811   Postpartum ( Delivery)   Problems Assessed (Postpartum ) all   Problems Present (Postpartum ) none

## 2018-10-20 NOTE — PLAN OF CARE
Problem: Postpartum ( Delivery) (Adult,Obstetrics,Pediatric)  Goal: Signs and Symptoms of Listed Potential Problems Will be Absent, Minimized or Managed (Postpartum)   10/20/18 3315   Postpartum ( Delivery)   Problems Assessed (Postpartum ) bowel motility decreased;pain;postoperative nausea and vomiting;VTE (venous thromboembolism);wound healing impaired

## 2018-10-20 NOTE — PLAN OF CARE
Problem: Breastfeeding (Adult,Obstetrics,Pediatric)  Goal: Signs and Symptoms of Listed Potential Problems Will be Absent, Minimized or Managed (Breastfeeding)   10/20/18 4686   Breastfeeding   Problems Assessed (Breastfeeding) ineffective breastfeeding;pain;skin breakdown

## 2018-10-20 NOTE — LACTATION NOTE
"Per mom infant BF well. Reviewed BF teaching and BF section of \"[employer]\" pt.  book. Questions answered. Has pump for home. Aware to pump PRN if decreased milk supply.   "

## 2018-10-20 NOTE — PROGRESS NOTES
Obstetrics Postpartum Progress Note    Events  Pt seen/examined. No events overnight.     Subjective  Pain: well controlled   Bleeding: lochia minimal  Diet: taking regular diet  Voiding: shelton discontinued, awaiting spontaneous void  Bowel: passing flatus  Ambulating: not yet out of bed    Vitals  Temp:  [35.6 °C (96.1 °F)-36.9 °C (98.4 °F)] 36.9 °C (98.4 °F)  Heart Rate:  [] 69  Resp:  [16-18] 18  BP: ()/(48-84) 118/56    I&O    Intake/Output Summary (Last 24 hours) at 10/20/18 0752  Last data filed at 10/20/18 0600   Gross per 24 hour   Intake             2650 ml   Output             5300 ml   Net            -2650 ml       Physical Exam  General: A&Ox3 and NAD   Heart: Regular rate and rhythm  Lungs: Clear to auscultation bilaterally  Abdomen: soft, nondistended, non-tender, no rebound/rigidity/guarding.  Fundus: firm and below umbilicus   Incision: healing well, mild serosanguinous drainage on steri-strips   Extremities: symmetric and no edema    Labs  Labs Reviewed:  Lab Results   Component Value Date    ABO A 10/19/2018    LABRH Positive 10/19/2018      Rubella: immune       Results from last 7 days  Lab Units 10/20/18  0536 10/19/18  0824   WBC K/uL 7.60 7.69   HEMOGLOBIN g/dL 10.1* 11.9   HEMATOCRIT % 28.2* 33.2*   PLATELETS K/uL 88* 112*       Assessment/Plan   Problem-based Assessment and Plan    Sayda Thomason is a 27 y.o.  PPD# 1 s/p , Low Vertical .    1. Vital Signs: stable  2. Hemodynamics: stable  3. Pain: controlled  4. VTE Assessment: Early Ambulation, SCDs  5. Vaccinations/Rhogam: rhogam not indicated   6. Post care: meeting post operative goals appropriately. For TOV this morning. To ambulate today.   7. Thrombocytopenia: likely gestational and in the setting of major surgery. Bleeding appropriate. Repeat CBC if clinically indicated    Brisa Hoffman MD

## 2018-10-20 NOTE — PROGRESS NOTES
Patient: Sayda [place]  Procedure(s) with comments:  Repeat c section - Dr. JAMAAL Mandel will assist  Location: [facility] L&D OR    Last vitals:   Vitals:    10/20/18 0700   BP: (!) 118/56   Pulse: 69   Resp: 18   Temp: 36.9 °C (98.4 °F)   SpO2: 95%     Level of consciousness: Awake, Alert, Oriented  Post-anesthesia pain: Adequate analgesia  Anesthetic complications: None  Nausea and Vomiting Controled: Yes  Hydration: Adequate  Cardiovascular Function: Stable  Neuro Exam: WNL  Respiration: WNL  Pain is well controlled after spinal preservative free morphine administration and additional IV/PO meds:  Continue 24 hours observation after preservative free morphine administration:

## 2018-10-21 PROCEDURE — 63600000 HC DRUGS/DETAIL CODE: Performed by: OBSTETRICS & GYNECOLOGY

## 2018-10-21 PROCEDURE — 63700000 HC SELF-ADMINISTRABLE DRUG: Performed by: OBSTETRICS & GYNECOLOGY

## 2018-10-21 PROCEDURE — 63600000 HC DRUGS/DETAIL CODE: Performed by: STUDENT IN AN ORGANIZED HEALTH CARE EDUCATION/TRAINING PROGRAM

## 2018-10-21 PROCEDURE — 90686 IIV4 VACC NO PRSV 0.5 ML IM: CPT | Performed by: STUDENT IN AN ORGANIZED HEALTH CARE EDUCATION/TRAINING PROGRAM

## 2018-10-21 PROCEDURE — 12000000 HC ROOM AND CARE MED/SURG

## 2018-10-21 PROCEDURE — G0008 ADMIN INFLUENZA VIRUS VAC: HCPCS | Performed by: STUDENT IN AN ORGANIZED HEALTH CARE EDUCATION/TRAINING PROGRAM

## 2018-10-21 RX ADMIN — OXYCODONE HYDROCHLORIDE 5 MG: 5 TABLET ORAL at 22:59

## 2018-10-21 RX ADMIN — SENNOSIDES AND DOCUSATE SODIUM 1 TABLET: 8.6; 5 TABLET ORAL at 09:51

## 2018-10-21 RX ADMIN — IBUPROFEN 600 MG: 600 TABLET, FILM COATED ORAL at 06:40

## 2018-10-21 RX ADMIN — SENNOSIDES AND DOCUSATE SODIUM 1 TABLET: 8.6; 5 TABLET ORAL at 20:13

## 2018-10-21 RX ADMIN — KETOROLAC TROMETHAMINE 30 MG: 30 INJECTION, SOLUTION INTRAMUSCULAR at 00:29

## 2018-10-21 RX ADMIN — ACETAMINOPHEN 975 MG: 325 TABLET ORAL at 15:22

## 2018-10-21 RX ADMIN — IBUPROFEN 600 MG: 600 TABLET, FILM COATED ORAL at 12:54

## 2018-10-21 RX ADMIN — ACETAMINOPHEN 975 MG: 325 TABLET ORAL at 21:36

## 2018-10-21 RX ADMIN — PRENATAL VIT W/ FE FUMARATE-FA TAB 27-0.8 MG 1 TABLET: 27-0.8 TAB at 09:51

## 2018-10-21 RX ADMIN — ACETAMINOPHEN 975 MG: 325 TABLET ORAL at 03:45

## 2018-10-21 RX ADMIN — IBUPROFEN 600 MG: 600 TABLET, FILM COATED ORAL at 18:03

## 2018-10-21 RX ADMIN — INFLUENZA VIRUS VACCINE 60 MCG: 15; 15; 15; 15 SUSPENSION INTRAMUSCULAR at 23:00

## 2018-10-21 RX ADMIN — ACETAMINOPHEN 975 MG: 325 TABLET ORAL at 09:51

## 2018-10-21 NOTE — PLAN OF CARE
Problem: Patient Care Overview  Goal: Plan of Care Review  Outcome: Ongoing (interventions implemented as appropriate)   10/21/18 0054   Coping/Psychosocial   Plan Of Care Reviewed With patient   Plan of Care Review   Progress improving   Outcome Summary pt recovering well from repeat c/s. pain requiring oxycodone this evening. breast feeding going well. d/c home planned for Monday 10/22/18     Goal: Discharge Needs Assessment  Outcome: Ongoing (interventions implemented as appropriate)   10/21/18 0054   DC Needs Assessment   Concerns To Be Addressed no discharge needs identified   Readmission Within The Last 30 Days no previous admission in last 30 days   Provider Choice List(s) Given no   Anticipated Discharge Disposition home without services   Equipment Needed After Discharge other (see comments)  (breast pump)   Current Health   Anticipated Changes Related to Illness none   Activity/Self Care ROS   Equipment Currently Used at Home none       Problem: Breastfeeding (Adult,Obstetrics,Pediatric)  Goal: Signs and Symptoms of Listed Potential Problems Will be Absent, Minimized or Managed (Breastfeeding)  Outcome: Ongoing (interventions implemented as appropriate)   10/21/18 0054   Breastfeeding   Problems Assessed (Breastfeeding) all   Problems Present (Breastfeeding) none       Problem: Postpartum ( Delivery) (Adult,Obstetrics,Pediatric)  Goal: Signs and Symptoms of Listed Potential Problems Will be Absent, Minimized or Managed (Postpartum)  Outcome: Ongoing (interventions implemented as appropriate)   10/21/18 0054   Postpartum ( Delivery)   Problems Assessed (Postpartum ) all   Problems Present (Postpartum ) none      10/21/18 0054   Postpartum ( Delivery)   Problems Assessed (Postpartum ) all   Problems Present (Postpartum ) pain      10/21/18 0054   Postpartum ( Delivery)   Problems Assessed (Postpartum ) all   Problems Present (Postpartum  ) pain     Goal: Anesthesia/Sedation Recovery  Outcome: Adequate for Discharge   10/21/18 0054   Goal/Outcome Evaluation   Anesthesia/Sedation Recovery recovered to baseline

## 2018-10-21 NOTE — PROGRESS NOTES
Obstetrics Postpartum Progress Note    Events  Patient seen and examined. No acute events overnight.   Denies HA/CP/SOB. Denies N/V. Pain well controlled.    Subjective  Pain: controlled  Bleeding: lochia minimal  Diet: taking regular diet  Voiding: without difficulty  Bowel: passing flatus  Ambulating: as tolerated    Vitals  Temp:  [36.6 °C (97.8 °F)-37.5 °C (99.5 °F)] 37.5 °C (99.5 °F)  Heart Rate:  [54-69] 62  Resp:  [16-18] 18  BP: ()/(52-56) 108/55      Physical Exam  General: Well  Heart: Regular rate and rhythm  Lungs: Clear to auscultation bilaterally  Abdomen: soft, nondistended, appropriately TTP  Fundus: firm, at U  Incision: healing well. Clean,dry, and intact  Extremities: no edema    Labs  Labs Reviewed:  Lab Results   Component Value Date    ABO A 10/19/2018    LABRH Positive 10/19/2018      Rubella: immune    Assessment/Plan   Problem-based Assessment and Plan    Sayda Thomason is a 27 y.o.  postop day 2 s/p , Low Vertical .    1. Vital Signs: stable  2. Hemodynamics: stable Hgb 10.1 from 11.9  3. Pain: controlled  4. VTE Assessment: Early Ambulation, SCDs  5. Vaccinations/Rhogam: rhogam not indicated   6. Post care: meeting all goals Continue routine post op care.    Khalida Magaña, DO

## 2018-10-22 VITALS
TEMPERATURE: 98.6 F | HEIGHT: 65 IN | RESPIRATION RATE: 18 BRPM | OXYGEN SATURATION: 95 % | SYSTOLIC BLOOD PRESSURE: 121 MMHG | WEIGHT: 120 LBS | HEART RATE: 71 BPM | DIASTOLIC BLOOD PRESSURE: 80 MMHG | BODY MASS INDEX: 19.99 KG/M2

## 2018-10-22 PROCEDURE — 63700000 HC SELF-ADMINISTRABLE DRUG: Performed by: OBSTETRICS & GYNECOLOGY

## 2018-10-22 RX ADMIN — IBUPROFEN 600 MG: 600 TABLET, FILM COATED ORAL at 06:29

## 2018-10-22 RX ADMIN — ACETAMINOPHEN 975 MG: 325 TABLET ORAL at 10:06

## 2018-10-22 RX ADMIN — IBUPROFEN 600 MG: 600 TABLET, FILM COATED ORAL at 00:33

## 2018-10-22 RX ADMIN — PRENATAL VIT W/ FE FUMARATE-FA TAB 27-0.8 MG 1 TABLET: 27-0.8 TAB at 10:05

## 2018-10-22 RX ADMIN — ACETAMINOPHEN 975 MG: 325 TABLET ORAL at 03:43

## 2018-10-22 RX ADMIN — SENNOSIDES AND DOCUSATE SODIUM 1 TABLET: 8.6; 5 TABLET ORAL at 10:05

## 2018-10-22 NOTE — PROGRESS NOTES
Obstetrics Postpartum Progress Note    Events  Patient seen and examined. No acute events overnight.   Denies HA/CP/SOB. Denies N/V. Pain well controlled.    Subjective  Pain: controlled  Bleeding: lochia minimal  Diet: taking regular diet  Voiding: without difficulty  Bowel: passing flatus  Ambulating: as tolerated    Vitals  Temp:  [36.3 °C (97.4 °F)-36.9 °C (98.5 °F)] 36.9 °C (98.5 °F)  Heart Rate:  [61-68] 68  Resp:  [18] 18  BP: (114-124)/(60-76) 124/60      Physical Exam  General: Well  Heart: Regular rate and rhythm  Lungs: Clear to auscultation bilaterally  Abdomen: positive bowel sounds soft, nondistended, appropriately TTP  Fundus: firm, below the umbilicus   Incision: healing well. Clean,dry, and intact  Extremities: no edema    Labs  Labs Reviewed:  Lab Results   Component Value Date    ABO A 10/19/2018    LABRH Positive 10/19/2018      CBC Results       10/20/18 10/19/18 05/29/18                    0536 0824 1453         WBC 7.60 7.69 5.47         RBC 2.95 (L) 3.51 (L) 4.49         HGB 10.1 (L) 11.9 14.3         HCT 28.2 (L) 33.2 (L) 41.2         MCV 95.6 94.6 91.8         MCH 34.2 (H) 33.9 (H) 31.8         MCHC 35.8 (H) 35.8 (H) 34.7         PLT 88 (L) 112 (L) 160         Comment for PLT at 0536 on 10/20/18:  RESULTS OBTAINED AFTER VORTEXING TO ELIMINATE PLT CLUMPS        Rubella: immune    Assessment/Plan   Problem-based Assessment and Plan    Sayda Thomason is a 27 y.o.  postop day 3 s/p , Low Vertical .    1. Vital Signs: stable  2. Hemodynamics: Hgb 11.9--> 10.1 with no signs or symptoms of acute anemia   3. Pain: controlled  4. VTE Assessment: Early Ambulation, SCDs  5. Vaccinations/Rhogam: rhogam not indicated   6. Post care: meeting all goals Continue routine post op care.    Vanessa Turner MD

## 2018-10-22 NOTE — LACTATION NOTE
Infant 1oz from 10% wt loss. Per mom infant BF well. Breasts filling. Reviewed BF d/c instructions. Aware to monitor output. Mom to pump PRN but avoid oversupply. Questions answered.

## 2018-10-22 NOTE — PLAN OF CARE
Problem: Patient Care Overview  Goal: Plan of Care Review  Outcome: Ongoing (interventions implemented as appropriate)   10/22/18 0313   Coping/Psychosocial   Plan Of Care Reviewed With patient   Plan of Care Review   Progress improving   Outcome Summary pt recovering well. breast feeding. d/c planned for later today 10/22/18.     Goal: Discharge Needs Assessment  Outcome: Ongoing (interventions implemented as appropriate)   10/22/18 0313   DC Needs Assessment   Concerns To Be Addressed no discharge needs identified   Readmission Within The Last 30 Days no previous admission in last 30 days   Provider Choice List(s) Given no   Anticipated Discharge Disposition home without services   Equipment Needed After Discharge none   Current Health   Anticipated Changes Related to Illness none   Activity/Self Care ROS   Equipment Currently Used at Home none       Problem: Breastfeeding (Adult,Obstetrics,Pediatric)  Goal: Signs and Symptoms of Listed Potential Problems Will be Absent, Minimized or Managed (Breastfeeding)  Outcome: Ongoing (interventions implemented as appropriate)      Problem: Postpartum ( Delivery) (Adult,Obstetrics,Pediatric)  Goal: Signs and Symptoms of Listed Potential Problems Will be Absent, Minimized or Managed (Postpartum)  Outcome: Ongoing (interventions implemented as appropriate)   10/22/18 0313   Postpartum ( Delivery)   Problems Assessed (Postpartum ) all   Problems Present (Postpartum ) pain      10/22/18 0313   Postpartum ( Delivery)   Problems Assessed (Postpartum ) all   Problems Present (Postpartum ) pain     Goal: Anesthesia/Sedation Recovery  Outcome: Adequate for Discharge   10/22/18 0313   Goal/Outcome Evaluation   Anesthesia/Sedation Recovery recovered to baseline

## 2019-12-12 NOTE — LACTATION NOTE
Interval History: Patient with good spirits and is optimistic regarding surgery.  She c/o constipation for past several days, otherwise she denies chest pain, palpitations, or shortness of breath. Denies any acute events or distress overnight.     Review of Systems   Constitutional: Positive for activity change, appetite change (early satiety) and fatigue. Negative for chills, diaphoresis and fever.   HENT: Negative for congestion, sore throat, trouble swallowing and voice change.    Respiratory: Negative for cough, chest tightness, shortness of breath and wheezing.    Cardiovascular: Negative for chest pain, palpitations and leg swelling.   Gastrointestinal: Positive for constipation. Negative for abdominal distention, abdominal pain, diarrhea, nausea and vomiting.   Genitourinary: Positive for difficulty urinating (odonnell removed and using purewick once again successfully today). Negative for decreased urine volume.   Musculoskeletal: Positive for arthralgias, back pain, gait problem and myalgias. Negative for joint swelling.   Skin: Positive for wound. Negative for rash.   Neurological: Positive for weakness. Negative for dizziness, syncope, light-headedness and headaches.   Psychiatric/Behavioral: Negative for agitation. The patient is not nervous/anxious.      Objective:     Vital Signs (Most Recent):  Temp: (!) 100.8 °F (38.2 °C) (12/11/19 1512)  Pulse: 86 (12/11/19 1515)  Resp: 18 (12/11/19 1512)  BP: (!) 151/67 (12/11/19 1512)  SpO2: 99 % (12/11/19 1512) Vital Signs (24h Range):  Temp:  [97.9 °F (36.6 °C)-100.8 °F (38.2 °C)] 100.8 °F (38.2 °C)  Pulse:  [66-86] 86  Resp:  [16-19] 18  SpO2:  [90 %-99 %] 99 %  BP: (130-151)/(59-72) 151/67     Weight: 60 kg (132 lb 4.4 oz)  Body mass index is 21.35 kg/m².    Intake/Output Summary (Last 24 hours) at 12/11/2019 1922  Last data filed at 12/11/2019 1900  Gross per 24 hour   Intake 240 ml   Output 1175 ml   Net -935 ml      Physical Exam   Constitutional: She is  Mother reports baby BF well so far. Reports h/o BF first child but was in NICU so pumped initially and always had low milk supply. Discussed potential for low milk supply this time and offered to pump but mother declines right now. Reviewed basic BF info, expected milk production and feeding cues. Questions answered. Aware to call for latch assist PRN.    oriented to person, place, and time. She appears well-developed and well-nourished. No distress.   HENT:   Head: Normocephalic and atraumatic.   Mouth/Throat: Mucous membranes are normal. Normal dentition.   Eyes: Conjunctivae, EOM and lids are normal.   Neck: Normal range of motion. Neck supple. No JVD present.   Cardiovascular: Normal rate, regular rhythm, normal heart sounds and intact distal pulses.   No murmur heard.  Pulmonary/Chest: Effort normal. She has decreased breath sounds in the right lower field and the left lower field. She exhibits no tenderness.   On nasal cannula, no conversational dyspnea.   Abdominal: Soft. Bowel sounds are normal. She exhibits distension (bilateral flank 1+ pitting edema). There is no tenderness.   R sided abd anasarca continues/ flank edema (resolving)   Genitourinary:   Genitourinary Comments: Whiteside in place, clear yellow urine noted.   Musculoskeletal: Normal range of motion. She exhibits edema (R calf above cast 2+ pitting edema. L leg resolved. ) and tenderness (RLE). She exhibits no deformity.   Neurological: She is alert and oriented to person, place, and time. No cranial nerve deficit. Gait abnormal.   Skin: Skin is warm and dry. No rash noted. She is not diaphoretic. No erythema.   Right leg half cast with ace bandage with wound vac in place to R lateral ankle.  LLE wound healed  Wound Care notes reviewed for pressure injury >> see media.   Psychiatric: Judgment and thought content normal. Her mood appears not anxious. Her affect is not angry. She is not agitated.   Flat affect, participates in conversation and ROS.   Nursing note and vitals reviewed.    Significant Labs:   CBC:   Recent Labs   Lab 12/10/19  0510 12/11/19 0435   WBC 7.26 7.86   HGB 8.2* 8.0*   HCT 27.0* 26.1*    217     CMP:   Recent Labs   Lab 12/10/19  0510 12/11/19 0435   * 133*   K 4.0 3.7   CL 96 95   CO2 31* 29   * 84   BUN 29* 23   CREATININE 1.2 1.2   CALCIUM 8.3* 8.3*    PROT 6.0 6.0   ALBUMIN 2.2* 2.2*   BILITOT 0.7 0.7   ALKPHOS 98 91   AST 14 17   ALT <5* <5*   ANIONGAP 8 9   EGFRNONAA 47.5* 47.5*     Magnesium:   Recent Labs   Lab 12/10/19  0510 12/11/19  0435   MG 2.0 1.6     Significant Imaging: I have reviewed all pertinent imaging results/findings within the past 24 hours.

## 2020-01-16 ENCOUNTER — TRANSCRIBE ORDERS (OUTPATIENT)
Dept: SCHEDULING | Age: 30
End: 2020-01-16

## 2020-01-16 DIAGNOSIS — R10.2 PELVIC AND PERINEAL PAIN: Primary | ICD-10-CM

## 2020-11-11 ENCOUNTER — APPOINTMENT (OUTPATIENT)
Dept: URBAN - METROPOLITAN AREA CLINIC 200 | Age: 30
Setting detail: DERMATOLOGY
End: 2020-12-01

## 2020-11-11 DIAGNOSIS — L57.8 OTHER SKIN CHANGES DUE TO CHRONIC EXPOSURE TO NONIONIZING RADIATION: ICD-10-CM

## 2020-11-11 DIAGNOSIS — Z87.2 PERSONAL HISTORY OF DISEASES OF THE SKIN AND SUBCUTANEOUS TISSUE: ICD-10-CM

## 2020-11-11 PROCEDURE — OTHER MIPS QUALITY: OTHER

## 2020-11-11 PROCEDURE — 99202 OFFICE O/P NEW SF 15 MIN: CPT

## 2020-11-11 PROCEDURE — OTHER COUNSELING: OTHER

## 2020-11-11 ASSESSMENT — LOCATION SIMPLE DESCRIPTION DERM: LOCATION SIMPLE: CHEST

## 2020-11-11 ASSESSMENT — LOCATION DETAILED DESCRIPTION DERM: LOCATION DETAILED: LEFT MEDIAL SUPERIOR CHEST

## 2020-11-11 ASSESSMENT — LOCATION ZONE DERM: LOCATION ZONE: TRUNK

## 2021-03-05 ENCOUNTER — TRANSCRIBE ORDERS (OUTPATIENT)
Dept: SCHEDULING | Age: 31
End: 2021-03-05

## 2021-03-05 DIAGNOSIS — Z34.91 ENCOUNTER FOR SUPERVISION OF NORMAL PREGNANCY, UNSPECIFIED, FIRST TRIMESTER: Primary | ICD-10-CM

## 2021-04-19 ENCOUNTER — TRANSCRIBE ORDERS (OUTPATIENT)
Dept: SCHEDULING | Age: 31
End: 2021-04-19

## 2021-04-19 DIAGNOSIS — Z34.92 ENCOUNTER FOR SUPERVISION OF NORMAL PREGNANCY, UNSPECIFIED, SECOND TRIMESTER: Primary | ICD-10-CM

## 2021-05-24 ENCOUNTER — HOSPITAL ENCOUNTER (OUTPATIENT)
Dept: PERINATAL CARE | Facility: HOSPITAL | Age: 31
Discharge: HOME | End: 2021-05-24
Attending: OBSTETRICS & GYNECOLOGY
Payer: COMMERCIAL

## 2021-05-24 DIAGNOSIS — O35.9XX0 SUSPECTED FETAL ABNORMALITY AFFECTING MANAGEMENT OF MOTHER, ANTEPARTUM, SINGLE OR UNSPECIFIED FETUS: ICD-10-CM

## 2021-05-24 DIAGNOSIS — Z36.3 ANTENATAL SCREENING FOR MALFORMATION USING ULTRASONICS: ICD-10-CM

## 2021-05-24 DIAGNOSIS — Z3A.21 21 WEEKS GESTATION OF PREGNANCY: ICD-10-CM

## 2021-05-24 DIAGNOSIS — Z34.92 ENCOUNTER FOR SUPERVISION OF NORMAL PREGNANCY, UNSPECIFIED, SECOND TRIMESTER: ICD-10-CM

## 2021-05-24 PROCEDURE — 76805 OB US >/= 14 WKS SNGL FETUS: CPT

## 2021-07-01 ENCOUNTER — OFFICE VISIT (OUTPATIENT)
Dept: OBSTETRICS AND GYNECOLOGY | Facility: CLINIC | Age: 31
End: 2021-07-01
Payer: COMMERCIAL

## 2021-07-01 DIAGNOSIS — O36.5990 POOR FETAL GROWTH AFFECTING MANAGEMENT OF MOTHER, ANTEPARTUM, SINGLE OR UNSPECIFIED FETUS: Primary | ICD-10-CM

## 2021-07-01 DIAGNOSIS — Z34.90 PREGNANCY, UNSPECIFIED GESTATIONAL AGE: ICD-10-CM

## 2021-07-01 DIAGNOSIS — N91.2 AMENORRHEA: ICD-10-CM

## 2021-07-01 PROCEDURE — 76805 OB US >/= 14 WKS SNGL FETUS: CPT | Performed by: OBSTETRICS & GYNECOLOGY

## 2021-07-01 PROCEDURE — 99205 OFFICE O/P NEW HI 60 MIN: CPT | Performed by: OBSTETRICS & GYNECOLOGY

## 2021-07-05 ENCOUNTER — PREP FOR CASE (OUTPATIENT)
Dept: OBSTETRICS AND GYNECOLOGY | Facility: CLINIC | Age: 31
End: 2021-07-05

## 2021-07-05 DIAGNOSIS — O34.219 PREVIOUS CESAREAN DELIVERY AFFECTING PREGNANCY: Primary | ICD-10-CM

## 2021-07-05 PROBLEM — Z3A.28 28 WEEKS GESTATION OF PREGNANCY: Status: RESOLVED | Noted: 2018-08-02 | Resolved: 2021-07-05

## 2021-07-05 RX ORDER — ACETAMINOPHEN 650 MG/1
650 SUPPOSITORY RECTAL ONCE
Status: CANCELLED | OUTPATIENT
Start: 2021-07-05 | End: 2021-07-06

## 2021-07-05 RX ORDER — SODIUM CHLORIDE, SODIUM LACTATE, POTASSIUM CHLORIDE, CALCIUM CHLORIDE 600; 310; 30; 20 MG/100ML; MG/100ML; MG/100ML; MG/100ML
150 INJECTION, SOLUTION INTRAVENOUS CONTINUOUS
Status: CANCELLED | OUTPATIENT
Start: 2021-07-05 | End: 2021-07-12

## 2021-07-05 RX ORDER — ONDANSETRON 4 MG/1
8 TABLET, ORALLY DISINTEGRATING ORAL ONCE
Status: CANCELLED | OUTPATIENT
Start: 2021-07-05 | End: 2021-07-05

## 2021-07-05 RX ORDER — ACETAMINOPHEN 325 MG/1
975 TABLET ORAL ONCE
Status: CANCELLED | OUTPATIENT
Start: 2021-07-05 | End: 2021-07-05

## 2021-07-05 RX ORDER — SODIUM CITRATE AND CITRIC ACID MONOHYDRATE 334; 500 MG/5ML; MG/5ML
30 SOLUTION ORAL ONCE
Status: CANCELLED | OUTPATIENT
Start: 2021-07-05 | End: 2021-07-05

## 2021-07-05 NOTE — PROGRESS NOTES
Sayda Thomason is a  30 y.o.  with amenorrhea and subjective feelings of pregnancy  She has regular monthly menses     Pt is a transfer from Dr Israel Mandel at   26w6d  weeks gestation with  EDC 10/1/21 by early US    Pt had good PNC with normal NIPT/AFP and Leola US  Pt denies abd pain/cramping or bloating  No FCNV  No GI or  sx  No vag bleeding or pelvic pain      OB History:   OB History    Para Term  AB Living   3 2 2 0 0 2   SAB TAB Ectopic Multiple Live Births   0 0 0 0 2      # Outcome Date GA Lbr Ron/2nd Weight Sex Delivery Anes PTL Lv   3 Current            2 Term 10/19/18 39w2d  3060 g (6 lb 11.9 oz) F CS-LVertical Spinal N EAN      Name: JENNIFFER,GIRL      Apgar1: 8  Apgar5: 8   1 Term 16 40w0d  3289 g (7 lb 4 oz) F CS-LTranv   EAN      Complications: Other (comment), Breech birth       Medical History/GYN History   Past Medical History:   Diagnosis Date   • Abnormal Pap smear of cervix        Surgical History:   Past Surgical History:   Procedure Laterality Date   • COLPOSCOPY     • WISDOM TOOTH EXTRACTION         Social History:   Social History     Socioeconomic History   • Marital status: Single     Spouse name: None   • Number of children: None   • Years of education: None   • Highest education level: None   Occupational History   • None   Tobacco Use   • Smoking status: Never Smoker   • Smokeless tobacco: Never Used   Substance and Sexual Activity   • Alcohol use: No   • Drug use: No   • Sexual activity: Yes     Partners: Male     Birth control/protection: None   Other Topics Concern   • None   Social History Narrative   • None     Social Determinants of Health     Financial Resource Strain:    • Difficulty of Paying Living Expenses:    Food Insecurity:    • Worried About Running Out of Food in the Last Year:    • Ran Out of Food in the Last Year:    Transportation Needs:    • Lack of Transportation (Medical):    • Lack of Transportation (Non-Medical):    Physical  Activity:    • Days of Exercise per Week:    • Minutes of Exercise per Session:    Stress:    • Feeling of Stress :    Social Connections:    • Frequency of Communication with Friends and Family:    • Frequency of Social Gatherings with Friends and Family:    • Attends Faith Services:    • Active Member of Clubs or Organizations:    • Attends Club or Organization Meetings:    • Marital Status:    Intimate Partner Violence:    • Fear of Current or Ex-Partner:    • Emotionally Abused:    • Physically Abused:    • Sexually Abused:         Family History: History reviewed. No pertinent family history.    Allergies: Patient has no known allergies.    Prior to Admission medications    Medication Sig Start Date End Date Taking? Authorizing Provider   prenatal vits96/iron fum/folic (PRE- VITAMIN) 27 mg iron- 800 mcg tablet Take 1 tablet by mouth daily.   Yes Provider, MD Gloria       Review of Systems  Fatigue neg  Weight Change neg  Cough neg  Chest Pain neg  SOB neg  Abd pain neg  Breast problems neg  Abnormal bleeding neg  Vaginal D/C neg  Pelvic Pain neg  All other systems neg      Objective     Physical Exam   Constitutional: She is oriented to person, place, and time. She appears well-developed and well-nourished.   HEENT:   Head: Normocephalic and atraumatic.   Eyes: EOM are normal. Pupils are equal, round, and reactive to light.   Neck: Normal range of motion. No tracheal deviation present. No thyromegaly present.   Abdominal: Soft. Bowel sounds are normal. She exhibits no distension and no mass. There is no tenderness. There is no rebound and no guarding.   Musculoskeletal: Normal range of motion. She exhibits no edema.   Lymphadenopathy:     She has no cervical adenopathy.   Neurological: She is alert and oriented to person, place, and time. No cranial nerve deficit.   Skin: Skin is warm and dry.   Psychiatric: She has a normal mood and affect.   Genitourinary: Vagina normal.   External female  genitalia normal.   Normal bladder.   Vagina normal. No vaginal discharge found.   Cervix exam normal.Cervix does not exhibit motion tenderness (cervix is L/C/P) or discharge. Uterus is 26 week size . Uterine contour is regular.   Adnexa normal. Right adnexum does not display tenderness, does not display fullness and palpable. Left adnexum does not display tenderness, does not display fullness and palpable.   Nursing note and vitals reviewed.       Assessment: 30 y.o.  with Amenorrhea at 26w6d  weeks  and IUP with Estimated Date of Delivery: 10/1/21 by early US    1) Pregnancy:   Pap normal   PNL, urine culture, TFTs,   PE neg  NIPT/AFP/Leola US normal  Exercise and Nutrition counseling in pregnancy, safe foods, weight gain and nutrition reviewed  Routine prenatal care and counseling provided  Major Morbidity and Mortality of age group d/w pt  Safe medications in pregnancy information given  Travel and Zika precautions reviewed  Safety of hair dye, manicure/pedicure and massage reviewed  Prenatal Vitamins with DHA  OB reg at next visit with GTT CBC    2) Hx LT csxn  Pt declines TOLAC and wants rpt lt csxn at term  Risks of OR reviewed including risks in future preg and accreta  For RPT csx at term        Over 60 minutes spent face to face time with this patient performing the following activities: obtaining history, performing exam, entering orders, documenting, providing counseling and education

## 2021-07-08 ENCOUNTER — APPOINTMENT (OUTPATIENT)
Dept: LAB | Age: 31
End: 2021-07-08
Attending: OBSTETRICS & GYNECOLOGY
Payer: COMMERCIAL

## 2021-07-08 DIAGNOSIS — Z34.90 ENCOUNTER FOR SUPERVISION OF NORMAL PREGNANCY, UNSPECIFIED, UNSPECIFIED TRIMESTER: ICD-10-CM

## 2021-07-08 PROCEDURE — 30099997 SPECIMEN PROCESSING

## 2021-07-09 LAB
BASOPHILS # BLD AUTO: 12 CELLS/UL (ref 0–200)
BASOPHILS NFR BLD AUTO: 0.2 %
EOSINOPHIL # BLD AUTO: 61 CELLS/UL (ref 15–500)
EOSINOPHIL NFR BLD AUTO: 1 %
ERYTHROCYTE [DISTWIDTH] IN BLOOD BY AUTOMATED COUNT: 12.2 % (ref 11–15)
GLUCOSE 1H P 50 G GLC PO SERPL-MCNC: 94 MG/DL
HCT VFR BLD AUTO: 38.6 % (ref 35–45)
HGB BLD-MCNC: 12.9 G/DL (ref 11.7–15.5)
LYMPHOCYTES # BLD AUTO: 946 CELLS/UL (ref 850–3900)
LYMPHOCYTES NFR BLD AUTO: 15.5 %
MCH RBC QN AUTO: 33.7 PG (ref 27–33)
MCHC RBC AUTO-ENTMCNC: 33.4 G/DL (ref 32–36)
MCV RBC AUTO: 100.8 FL (ref 80–100)
MONOCYTES # BLD AUTO: 384 CELLS/UL (ref 200–950)
MONOCYTES NFR BLD AUTO: 6.3 %
NEUTROPHILS # BLD AUTO: 4697 CELLS/UL (ref 1500–7800)
NEUTROPHILS NFR BLD AUTO: 77 %
PLATELET # BLD AUTO: 160 THOUSAND/UL (ref 140–400)
PMV BLD REES-ECKER: 10.7 FL (ref 7.5–12.5)
RBC # BLD AUTO: 3.83 MILLION/UL (ref 3.8–5.1)
WBC # BLD AUTO: 6.1 THOUSAND/UL (ref 3.8–10.8)

## 2021-07-20 ENCOUNTER — OFFICE VISIT (OUTPATIENT)
Dept: OBSTETRICS AND GYNECOLOGY | Facility: CLINIC | Age: 31
End: 2021-07-20
Payer: COMMERCIAL

## 2021-07-20 VITALS — DIASTOLIC BLOOD PRESSURE: 70 MMHG | SYSTOLIC BLOOD PRESSURE: 98 MMHG | BODY MASS INDEX: 20.47 KG/M2 | WEIGHT: 123 LBS

## 2021-07-20 DIAGNOSIS — Z34.90 PREGNANCY, UNSPECIFIED GESTATIONAL AGE: Primary | ICD-10-CM

## 2021-07-20 LAB
BLOOD URINE, POC: NEGATIVE
EXPIRATION DATE: NORMAL
GLUCOSE URINE, POC: NEGATIVE
LEUKOCYTE EST, POC: NEGATIVE
Lab: NORMAL
NITRITE, POC: NEGATIVE
POCT MANUFACTURER: NORMAL
PROTEIN, POC: NEGATIVE

## 2021-07-20 PROCEDURE — 0500F INITIAL PRENATAL CARE VISIT: CPT | Performed by: OBSTETRICS & GYNECOLOGY

## 2021-07-20 PROCEDURE — 81002 URINALYSIS NONAUTO W/O SCOPE: CPT | Performed by: OBSTETRICS & GYNECOLOGY

## 2021-07-20 NOTE — PRENATAL NOTE
No complaints: transfer from Dr Ministerio Whitney counts and PTL precautions reviewed  PNL reviewed: NIPT/AFP and Leola US normal  GTT normal and HGB 12.9  Hx LT csxn: desires RPT #3 at term  Tdap today  Plan for F/U US at [facility] in 3-4 weeks for Hx LT csxn x2

## 2021-08-09 NOTE — PRENATAL NOTE
No complaints: transfer from Dr Ministerio Whitney counts and PTL precautions reviewed  PNL reviewed: NIPT/AFP and Leola US normal  GTT normal and HGB 12.9  Hx LT csxn: desires RPT #3 at term  Tdap given  US at [facility] scheduled 8/16/21 weeks for Hx LT csxn x2  Audible fetal arrythmia: d/w pt and pt to LD for eval and MFM consult  F/u 2 weeks with DU then for RPT US at [facility] at 36-37 weeks

## 2021-08-10 ENCOUNTER — ROUTINE PRENATAL (OUTPATIENT)
Dept: OBSTETRICS AND GYNECOLOGY | Facility: CLINIC | Age: 31
End: 2021-08-10
Payer: COMMERCIAL

## 2021-08-10 ENCOUNTER — HOSPITAL ENCOUNTER (OUTPATIENT)
Facility: HOSPITAL | Age: 31
Discharge: HOME | End: 2021-08-10
Attending: OBSTETRICS & GYNECOLOGY | Admitting: OBSTETRICS & GYNECOLOGY
Payer: COMMERCIAL

## 2021-08-10 ENCOUNTER — APPOINTMENT (OUTPATIENT)
Dept: PERINATAL CARE | Facility: HOSPITAL | Age: 31
Setting detail: SURGERY ADMIT
End: 2021-08-10
Attending: NURSE PRACTITIONER
Payer: COMMERCIAL

## 2021-08-10 VITALS — WEIGHT: 111.8 LBS | SYSTOLIC BLOOD PRESSURE: 98 MMHG | BODY MASS INDEX: 18.6 KG/M2 | DIASTOLIC BLOOD PRESSURE: 60 MMHG

## 2021-08-10 VITALS — RESPIRATION RATE: 17 BRPM | TEMPERATURE: 98 F

## 2021-08-10 DIAGNOSIS — Z34.90 PREGNANCY, UNSPECIFIED GESTATIONAL AGE: ICD-10-CM

## 2021-08-10 DIAGNOSIS — O36.8390 FETAL ARRHYTHMIA AFFECTING PREGNANCY, ANTEPARTUM: Primary | ICD-10-CM

## 2021-08-10 DIAGNOSIS — O34.219 PREVIOUS CESAREAN DELIVERY AFFECTING PREGNANCY: ICD-10-CM

## 2021-08-10 DIAGNOSIS — Z3A.32 32 WEEKS GESTATION OF PREGNANCY: ICD-10-CM

## 2021-08-10 PROCEDURE — 76805 OB US >/= 14 WKS SNGL FETUS: CPT

## 2021-08-10 PROCEDURE — 81002 URINALYSIS NONAUTO W/O SCOPE: CPT | Performed by: OBSTETRICS & GYNECOLOGY

## 2021-08-10 PROCEDURE — 59025 FETAL NON-STRESS TEST: CPT | Mod: 26 | Performed by: OBSTETRICS & GYNECOLOGY

## 2021-08-10 PROCEDURE — 0502F SUBSEQUENT PRENATAL CARE: CPT | Performed by: OBSTETRICS & GYNECOLOGY

## 2021-08-10 ASSESSMENT — COGNITIVE AND FUNCTIONAL STATUS - GENERAL: DO YOU HAVE SERIOUS DIFFICULTY WALKING OR CLIMBING STAIRS: NO

## 2021-08-10 ASSESSMENT — PATIENT HEALTH QUESTIONNAIRE - PHQ9: SUM OF ALL RESPONSES TO PHQ9 QUESTIONS 1 & 2: 0

## 2021-08-10 NOTE — PROGRESS NOTES
MARIANA note:    Dr Cartwright at bedside discussing possible causes of fetal arrhythmia and growth ultrasound. PAC's noted during [facility]TC US, BPP . Prolonged fetal monitoring not indicated per Dr Dominguez. Pt to set up twice weekly NST's in the PTC per Dr Dominguez. Once Dr Cartwright has finished consultation with pt, pt stable for discharge home. Pt to keep her scheduled prenatal appointment in the office with Dr Dominguez.    Pt stable for discharge home. Handouts given and reviewed about fetal kick counts, and  labor s/s. Pt verbalizes understanding of discharge instructions and handouts.     Plan per MARIANA Gasca  08/10/21, 5:00 PM

## 2021-08-10 NOTE — H&P
HPI     Sayda Thomason is a 30 y.o. female  at 32w4d with an estimated due date of 10/1/2021, by first trimester Ultrasound who presents from the office with an audible arrhythmia. Pt denies ctx, lof, vb. Reports fetal  Movement. Reports an uncomplicated prenatal course. Denies medical history. Reports she had about 100-120mg of caffeine today, and also uses cocoa butter nightly. Anatomy scan WNL, NIPT results WNL Pt denies further complications at present time    Surgical h/o:  1) h/o two previous FT C/S, first one was for breech presentation    Last PO intake:  Last Food Intake Date: 08/10/21, Last Food Intake Time: 1300  Last Fluid Intake Date: 08/10/21, Last Fluid Intake Time: 1330    OB History:   OB History    Para Term  AB Living   3 2 2 0 0 2   SAB TAB Ectopic Multiple Live Births   0 0 0 0 2      # Outcome Date GA Lbr Ron/2nd Weight Sex Delivery Anes PTL Lv   3 Current            2 Term 10/19/18 39w2d  3.06 kg (6 lb 11.9 oz) F CS-LVertical Spinal N EAN      Name: JENNIFFER,GIRL      Apgar1: 8  Apgar5: 8   1 Term 16 40w0d  3.289 kg (7 lb 4 oz) F CS-LTranv   EAN      Complications: Other (comment), Breech birth       Medical History:   Past Medical History:   Diagnosis Date   • Abnormal Pap smear of cervix        Surgical History:   Past Surgical History:   Procedure Laterality Date   •  SECTION      ,    • COLPOSCOPY     • WISDOM TOOTH EXTRACTION         Social History:   Social History     Socioeconomic History   • Marital status: Single     Spouse name: None   • Number of children: None   • Years of education: None   • Highest education level: None   Occupational History   • None   Tobacco Use   • Smoking status: Never Smoker   • Smokeless tobacco: Never Used   Substance and Sexual Activity   • Alcohol use: No   • Drug use: No   • Sexual activity: Yes     Partners: Male     Birth control/protection: None   Other Topics Concern   • None   Social History Narrative    • None     Social Determinants of Health     Financial Resource Strain:    • Difficulty of Paying Living Expenses:    Food Insecurity: No Food Insecurity   • Worried About Running Out of Food in the Last Year: Never true   • Ran Out of Food in the Last Year: Never true   Transportation Needs:    • Lack of Transportation (Medical):    • Lack of Transportation (Non-Medical):    Physical Activity:    • Days of Exercise per Week:    • Minutes of Exercise per Session:    Stress:    • Feeling of Stress :    Social Connections:    • Frequency of Communication with Friends and Family:    • Frequency of Social Gatherings with Friends and Family:    • Attends [employer] Services:    • Active Member of Clubs or Organizations:    • Attends Club or Organization Meetings:    • Marital Status:    Intimate Partner Violence:    • Fear of Current or Ex-Partner:    • Emotionally Abused:    • Physically Abused:    • Sexually Abused:         Family History:   Family History   Problem Relation Age of Onset   • No Known Problems Paternal Grandfather    • No Known Problems Paternal Grandmother    • No Known Problems Maternal Grandmother    • No Known Problems Maternal Grandfather    • No Known Problems Biological Father    • No Known Problems Biological Mother    • No Known Problems Biological Brother    • No Known Problems Biological Sister        Allergies: Patient has no known allergies.    Prior to Admission medications    Medication Sig Start Date End Date Taking? Authorizing Provider   prenatal vits96/iron fum/folic (PRE- VITAMIN) 27 mg iron- 800 mcg tablet Take 1 tablet by mouth daily.   Yes Provider, Historical, MD       Review of Systems  Pertinent items are noted in HPI.    Objective     Vital Signs for the last 24 hours:  Temp:  [36.7 °C (98 °F)] 36.7 °C (98 °F)  Resp:  [17] 17  BP: (98)/(60) 98/60    Latest cervical exam:   deferred                Fetal Monitoring:  FHR Baseline: 120  FHR Variability: moderate  FHR  Accelerations: audible accelerations noted  Audible arrhythmia noted     Contraction Frequency: absent    Exam:  General Appearance: Alert, cooperative, no acute distress  Lungs: Clear to auscultation bilaterally, respirations unlabored  Heart: Regular rate and rhythm  Abdomen: soft, gravid, nontender  Extremities: no edema or calf tenderness  Neurologic: grossly intact without focal deficits    Ultrasounds:   I have reviewed the applicable Ultrasounds.        Labs:  Prenatal Labs:   · Blood type: A+   · RPR: negative   · Syphilis Antibody: unknown   · HepBsAg: negative   · Rubella: immune   · Rubeola: unknown   · Varicella: unknown    · HIV: pt declined   · Glucose tolerance testing: normal  · Group B Strep: unknown  · Gonorrhea: unknown  · Chlamydia: unknown    *transfer of care from Dr EDUARDA Mandel at 26 weeks, prenatal labs scanned into media section of epic     Assessment/Plan     Sayda Thomason is a 30 y.o. female  at 32w4d admitted for observation 2/2 audible fetal arrhythmia     -FHR: Other intermittent tracing due to audible arrythmia . Audible fetal movement noted   -GBS: unknown   -fetal arrhythmia: audible arrhythmia noted: unable to determine. Baseline appears to be 120bpm, audible accels noted. On the phone with Dr Cartwright, who is aware of M consult: PTC ultrasound ordered. Suspect fetal arrythmia may possibly be PAC's r/t increased caffeine intake. Will continue to monitor at this time.     Discussed with MARIANA Lanier

## 2021-08-12 ENCOUNTER — HOSPITAL ENCOUNTER (OUTPATIENT)
Dept: PERINATAL CARE | Facility: HOSPITAL | Age: 31
Discharge: HOME | End: 2021-08-12
Attending: OBSTETRICS & GYNECOLOGY
Payer: COMMERCIAL

## 2021-08-12 VITALS — SYSTOLIC BLOOD PRESSURE: 110 MMHG | DIASTOLIC BLOOD PRESSURE: 67 MMHG | HEART RATE: 66 BPM

## 2021-08-12 DIAGNOSIS — O28.8 NON-STRESS TEST NONREACTIVE: ICD-10-CM

## 2021-08-12 DIAGNOSIS — Z3A.32 32 WEEKS GESTATION OF PREGNANCY: ICD-10-CM

## 2021-08-12 DIAGNOSIS — O36.8390 ARRHYTHMIA OF FETUS AFFECTING MANAGEMENT OF PREGNANCY: Primary | ICD-10-CM

## 2021-08-12 PROCEDURE — 76818 FETAL BIOPHYS PROFILE W/NST: CPT

## 2021-08-17 ENCOUNTER — HOSPITAL ENCOUNTER (OUTPATIENT)
Dept: PERINATAL CARE | Facility: HOSPITAL | Age: 31
Discharge: HOME | End: 2021-08-17
Attending: OBSTETRICS & GYNECOLOGY
Payer: COMMERCIAL

## 2021-08-17 VITALS — SYSTOLIC BLOOD PRESSURE: 117 MMHG | DIASTOLIC BLOOD PRESSURE: 62 MMHG

## 2021-08-17 DIAGNOSIS — O36.8390 ABNORMALITY IN FETAL HEART RATE/RHYTHM, ANTEPARTUM CONDITION OR COMPLICATION: Primary | ICD-10-CM

## 2021-08-17 DIAGNOSIS — Z3A.33 33 WEEKS GESTATION OF PREGNANCY: ICD-10-CM

## 2021-08-17 PROCEDURE — 76818 FETAL BIOPHYS PROFILE W/NST: CPT

## 2021-08-20 ENCOUNTER — HOSPITAL ENCOUNTER (OUTPATIENT)
Dept: PERINATAL CARE | Facility: HOSPITAL | Age: 31
Discharge: HOME | End: 2021-08-20
Attending: OBSTETRICS & GYNECOLOGY
Payer: COMMERCIAL

## 2021-08-20 VITALS — DIASTOLIC BLOOD PRESSURE: 59 MMHG | SYSTOLIC BLOOD PRESSURE: 106 MMHG

## 2021-08-20 DIAGNOSIS — O36.8390 ABNORMALITY IN FETAL HEART RATE/RHYTHM, ANTEPARTUM CONDITION OR COMPLICATION: Primary | ICD-10-CM

## 2021-08-20 DIAGNOSIS — Z3A.34 34 WEEKS GESTATION OF PREGNANCY: ICD-10-CM

## 2021-08-20 PROCEDURE — 59025 FETAL NON-STRESS TEST: CPT

## 2021-08-24 ENCOUNTER — ROUTINE PRENATAL (OUTPATIENT)
Dept: OBSTETRICS AND GYNECOLOGY | Facility: CLINIC | Age: 31
End: 2021-08-24
Payer: COMMERCIAL

## 2021-08-24 ENCOUNTER — HOSPITAL ENCOUNTER (OUTPATIENT)
Dept: PERINATAL CARE | Facility: HOSPITAL | Age: 31
Discharge: HOME | End: 2021-08-24
Attending: OBSTETRICS & GYNECOLOGY
Payer: COMMERCIAL

## 2021-08-24 VITALS — BODY MASS INDEX: 19.2 KG/M2 | SYSTOLIC BLOOD PRESSURE: 98 MMHG | WEIGHT: 115.4 LBS | DIASTOLIC BLOOD PRESSURE: 70 MMHG

## 2021-08-24 VITALS — DIASTOLIC BLOOD PRESSURE: 64 MMHG | SYSTOLIC BLOOD PRESSURE: 106 MMHG

## 2021-08-24 DIAGNOSIS — O36.8390 FETAL ARRHYTHMIA AFFECTING PREGNANCY, ANTEPARTUM: ICD-10-CM

## 2021-08-24 DIAGNOSIS — Z3A.34 34 WEEKS GESTATION OF PREGNANCY: ICD-10-CM

## 2021-08-24 DIAGNOSIS — O36.5990 POOR FETAL GROWTH AFFECTING MANAGEMENT OF MOTHER, ANTEPARTUM, SINGLE OR UNSPECIFIED FETUS: Primary | ICD-10-CM

## 2021-08-24 DIAGNOSIS — Z34.90 PREGNANCY, UNSPECIFIED GESTATIONAL AGE: ICD-10-CM

## 2021-08-24 PROCEDURE — 59025 FETAL NON-STRESS TEST: CPT

## 2021-08-24 PROCEDURE — 0502F SUBSEQUENT PRENATAL CARE: CPT | Performed by: OBSTETRICS & GYNECOLOGY

## 2021-08-24 PROCEDURE — 81002 URINALYSIS NONAUTO W/O SCOPE: CPT | Performed by: OBSTETRICS & GYNECOLOGY

## 2021-08-24 NOTE — PRENATAL NOTE
No complaints: transfer from Dr Ministerio Whitney counts and PTL precautions reviewed  PNL reviewed: NIPT/AFP and Leola US normal  GTT normal and HGB 12.9  Hx LT csxn: desires RPT #3 at term  Tdap given  US at [facility] scheduled 8/10/21 weeks for Hx LT csxn x2 : Breech 4#11 (53%)  Fetal PVC: improved after stopping caffeine: RNST and will inform peds at delivery  Cont 2x/week NST and for RPT US at [facility] at 36 weeks and for GBS/CBC/sign consents and HP at 36 weeks

## 2021-08-27 ENCOUNTER — HOSPITAL ENCOUNTER (OUTPATIENT)
Dept: PERINATAL CARE | Facility: HOSPITAL | Age: 31
Discharge: HOME | End: 2021-08-27
Attending: OBSTETRICS & GYNECOLOGY
Payer: COMMERCIAL

## 2021-08-27 VITALS — SYSTOLIC BLOOD PRESSURE: 100 MMHG | DIASTOLIC BLOOD PRESSURE: 59 MMHG

## 2021-08-27 DIAGNOSIS — O36.8390 FETAL ARRHYTHMIA AFFECTING PREGNANCY, ANTEPARTUM: ICD-10-CM

## 2021-08-27 DIAGNOSIS — O28.8 NON-REACTIVE NST (NON-STRESS TEST): ICD-10-CM

## 2021-08-27 DIAGNOSIS — Z3A.35 35 WEEKS GESTATION OF PREGNANCY: ICD-10-CM

## 2021-08-27 DIAGNOSIS — O09.93 SUPERVISION OF HIGH RISK PREGNANCY IN THIRD TRIMESTER: Primary | ICD-10-CM

## 2021-08-27 PROCEDURE — 59025 FETAL NON-STRESS TEST: CPT

## 2021-08-31 ENCOUNTER — HOSPITAL ENCOUNTER (OUTPATIENT)
Dept: PERINATAL CARE | Facility: HOSPITAL | Age: 31
Discharge: HOME | End: 2021-08-31
Attending: OBSTETRICS & GYNECOLOGY
Payer: COMMERCIAL

## 2021-08-31 VITALS — SYSTOLIC BLOOD PRESSURE: 118 MMHG | DIASTOLIC BLOOD PRESSURE: 72 MMHG

## 2021-08-31 DIAGNOSIS — O28.8 NST (NON-STRESS TEST) NONREACTIVE: ICD-10-CM

## 2021-08-31 DIAGNOSIS — O36.8390 FETAL ARRHYTHMIA AFFECTING PREGNANCY, ANTEPARTUM: ICD-10-CM

## 2021-08-31 DIAGNOSIS — O09.93 SUPERVISION OF HIGH RISK PREGNANCY IN THIRD TRIMESTER: Primary | ICD-10-CM

## 2021-08-31 DIAGNOSIS — Z3A.35 35 WEEKS GESTATION OF PREGNANCY: ICD-10-CM

## 2021-08-31 PROCEDURE — 76818 FETAL BIOPHYS PROFILE W/NST: CPT

## 2021-09-03 ENCOUNTER — HOSPITAL ENCOUNTER (OUTPATIENT)
Dept: PERINATAL CARE | Facility: HOSPITAL | Age: 31
Discharge: HOME | End: 2021-09-03
Attending: OBSTETRICS & GYNECOLOGY
Payer: COMMERCIAL

## 2021-09-03 VITALS — SYSTOLIC BLOOD PRESSURE: 107 MMHG | DIASTOLIC BLOOD PRESSURE: 62 MMHG

## 2021-09-03 DIAGNOSIS — Z3A.36 36 WEEKS GESTATION OF PREGNANCY: ICD-10-CM

## 2021-09-03 DIAGNOSIS — O36.8390 ABNORMALITY IN FETAL HEART RATE/RHYTHM, ANTEPARTUM CONDITION OR COMPLICATION: Primary | ICD-10-CM

## 2021-09-03 PROCEDURE — 59025 FETAL NON-STRESS TEST: CPT

## 2021-09-07 ENCOUNTER — HOSPITAL ENCOUNTER (OUTPATIENT)
Dept: PERINATAL CARE | Facility: HOSPITAL | Age: 31
Discharge: HOME | End: 2021-09-07
Attending: OBSTETRICS & GYNECOLOGY
Payer: COMMERCIAL

## 2021-09-07 VITALS — SYSTOLIC BLOOD PRESSURE: 108 MMHG | DIASTOLIC BLOOD PRESSURE: 64 MMHG

## 2021-09-07 DIAGNOSIS — O09.93 SUPERVISION OF HIGH RISK PREGNANCY IN THIRD TRIMESTER: Primary | ICD-10-CM

## 2021-09-07 DIAGNOSIS — Z3A.36 36 WEEKS GESTATION OF PREGNANCY: ICD-10-CM

## 2021-09-07 DIAGNOSIS — O36.8390 FETAL ARRHYTHMIA AFFECTING PREGNANCY, ANTEPARTUM: ICD-10-CM

## 2021-09-07 PROCEDURE — 76815 OB US LIMITED FETUS(S): CPT

## 2021-09-07 NOTE — PROGRESS NOTES
No complaints. Denies VB/LOF/CTX +FM  Kick counts and PTL precautions reviewed  PNL reviewed: NIPT/AFP and Leola US normal  GTT normal and HGB 12.9  For RCS  S/p Tdap  US at [facility] 8/31 5#14 45%  Fetal ECHO done at [place] 9/1 significant for Atrial Bigemini   Fetal PVC: improved after stopping caffeine: RNST and will inform peds at delivery  Cont 2x/week NST - last two have been wnl  CBC/GBS collected today   To sign consents with DU next appt

## 2021-09-08 ENCOUNTER — APPOINTMENT (OUTPATIENT)
Dept: LAB | Facility: HOSPITAL | Age: 31
End: 2021-09-08
Attending: STUDENT IN AN ORGANIZED HEALTH CARE EDUCATION/TRAINING PROGRAM
Payer: COMMERCIAL

## 2021-09-08 ENCOUNTER — ROUTINE PRENATAL (OUTPATIENT)
Dept: OBSTETRICS AND GYNECOLOGY | Facility: CLINIC | Age: 31
End: 2021-09-08
Payer: COMMERCIAL

## 2021-09-08 VITALS — WEIGHT: 118 LBS | BODY MASS INDEX: 19.64 KG/M2 | DIASTOLIC BLOOD PRESSURE: 66 MMHG | SYSTOLIC BLOOD PRESSURE: 108 MMHG

## 2021-09-08 DIAGNOSIS — Z34.90 PREGNANCY, UNSPECIFIED GESTATIONAL AGE: Primary | ICD-10-CM

## 2021-09-08 DIAGNOSIS — Z34.90 PREGNANCY, UNSPECIFIED GESTATIONAL AGE: ICD-10-CM

## 2021-09-08 LAB
BLOOD URINE, POC: NEGATIVE
ERYTHROCYTE [DISTWIDTH] IN BLOOD BY AUTOMATED COUNT: 12.5 % (ref 11.7–14.4)
EXPIRATION DATE: NORMAL
GLUCOSE URINE, POC: NEGATIVE
HCT VFR BLDCO AUTO: 33.9 % (ref 35–45)
HGB BLD-MCNC: 12 G/DL (ref 11.8–15.7)
LEUKOCYTE EST, POC: NEGATIVE
Lab: NORMAL
MCH RBC QN AUTO: 33.1 PG (ref 28–33.2)
MCHC RBC AUTO-ENTMCNC: 35.4 G/DL (ref 32.2–35.5)
MCV RBC AUTO: 93.4 FL (ref 83–98)
NITRITE, POC: NEGATIVE
PDW BLD AUTO: 10.5 FL (ref 9.4–12.3)
PLATELET # BLD AUTO: 148 K/UL (ref 150–369)
POCT MANUFACTURER: NORMAL
PROTEIN, POC: NEGATIVE
RBC # BLD AUTO: 3.63 M/UL (ref 3.93–5.22)
WBC # BLD AUTO: 5.57 K/UL (ref 3.8–10.5)

## 2021-09-08 PROCEDURE — 36415 COLL VENOUS BLD VENIPUNCTURE: CPT

## 2021-09-08 PROCEDURE — 81002 URINALYSIS NONAUTO W/O SCOPE: CPT | Performed by: STUDENT IN AN ORGANIZED HEALTH CARE EDUCATION/TRAINING PROGRAM

## 2021-09-08 PROCEDURE — 87150 DNA/RNA AMPLIFIED PROBE: CPT | Performed by: STUDENT IN AN ORGANIZED HEALTH CARE EDUCATION/TRAINING PROGRAM

## 2021-09-08 PROCEDURE — 87081 CULTURE SCREEN ONLY: CPT | Performed by: STUDENT IN AN ORGANIZED HEALTH CARE EDUCATION/TRAINING PROGRAM

## 2021-09-08 PROCEDURE — 0502F SUBSEQUENT PRENATAL CARE: CPT | Performed by: STUDENT IN AN ORGANIZED HEALTH CARE EDUCATION/TRAINING PROGRAM

## 2021-09-08 PROCEDURE — 85027 COMPLETE CBC AUTOMATED: CPT

## 2021-09-09 LAB
GP B STREP DNA SPEC QL NAA+PROBE: NEGATIVE
GP B STREP SPEC QL CULT: NORMAL

## 2021-09-10 ENCOUNTER — HOSPITAL ENCOUNTER (OUTPATIENT)
Dept: PERINATAL CARE | Facility: HOSPITAL | Age: 31
Discharge: HOME | End: 2021-09-10
Attending: OBSTETRICS & GYNECOLOGY
Payer: COMMERCIAL

## 2021-09-10 VITALS — DIASTOLIC BLOOD PRESSURE: 63 MMHG | SYSTOLIC BLOOD PRESSURE: 103 MMHG

## 2021-09-10 DIAGNOSIS — Z3A.37 37 WEEKS GESTATION OF PREGNANCY: ICD-10-CM

## 2021-09-10 DIAGNOSIS — O09.93 SUPERVISION OF HIGH RISK PREGNANCY IN THIRD TRIMESTER: Primary | ICD-10-CM

## 2021-09-10 DIAGNOSIS — O36.8390 FETAL ARRHYTHMIA AFFECTING PREGNANCY, ANTEPARTUM: ICD-10-CM

## 2021-09-10 PROCEDURE — 59025 FETAL NON-STRESS TEST: CPT

## 2021-09-13 ENCOUNTER — PREP FOR CASE (OUTPATIENT)
Dept: OBSTETRICS AND GYNECOLOGY | Facility: CLINIC | Age: 31
End: 2021-09-13

## 2021-09-13 NOTE — H&P
Assessment/Plan     Active Problems:  No Active Problems: There are no active problems currently on the Problem List. Please update the Problem List and refresh.       ***    Subjective     Sayda Thomason is an 30 y.o. female.    HPI:  ***     Past Surgical History:   Procedure Laterality Date   •  SECTION      ,    • COLPOSCOPY     • WISDOM TOOTH EXTRACTION         Review of Systems    Objective     There were no vitals taken for this visit.    Physical Exam

## 2021-09-13 NOTE — PRENATAL NOTE
No complaints: transfer from Dr Ministerio Whitney counts and labor precautions reviewed  PNL reviewed: NIPT/AFP and Leola US normal  GTT normal and HGB 12.9  Hx LT csxn: desires RPT #3 at term  Tdap given  US at [facility] scheduled 8/10/21 weeks for Hx LT csxn x2 : Breech 4#11 (53%)  Fetal PVC: Resolved today: RNST and will inform peds at delivery  Cont 2x/week NST  RPT US at [facility] 8/31/21 VTX 5#14 (45%)  GBS NEG; HGB stable at 12 and   sign consents and HP

## 2021-09-13 NOTE — H&P
HPI     Sayda Thomason is a 30 y.o. female  at 37w3d with an estimated due date of 10/1/2021, by Ultrasound for scheduled repeat  section #3: pt declines TOLAC    Good PNC c/w FETAL PVC:s/p fetal echo at [facility] with report in MEDIA      OB History:   OB History    Para Term  AB Living   3 2 2 0 0 2   SAB TAB Ectopic Multiple Live Births   0 0 0 0 2      # Outcome Date GA Lbr Ron/2nd Weight Sex Delivery Anes PTL Lv   3 Current            2 Term 10/19/18 39w2d  3060 g (6 lb 11.9 oz) F CS-LVertical Spinal N EAN      Name: JENNIFFER,GIRL      Apgar1: 8  Apgar5: 8   1 Term 16 40w0d  3289 g (7 lb 4 oz) F CS-LTranv   EAN      Complications: Other (comment), Breech birth       HISTORY; GYN [[id number]]    Medical History:   Past Medical History:   Diagnosis Date   • Abnormal Pap smear of cervix        Surgical History:   Past Surgical History:   Procedure Laterality Date   •  SECTION      ,    • COLPOSCOPY     • WISDOM TOOTH EXTRACTION         Social History:   Social History     Socioeconomic History   • Marital status: Single     Spouse name: Not on file   • Number of children: Not on file   • Years of education: Not on file   • Highest education level: Not on file   Occupational History   • Not on file   Tobacco Use   • Smoking status: Never Smoker   • Smokeless tobacco: Never Used   Substance and Sexual Activity   • Alcohol use: No   • Drug use: No   • Sexual activity: Yes     Partners: Male     Birth control/protection: None   Other Topics Concern   • Not on file   Social History Narrative   • Not on file     Social Determinants of Health     Financial Resource Strain:    • Difficulty of Paying Living Expenses:    Food Insecurity: No Food Insecurity   • Worried About Running Out of Food in the Last Year: Never true   • Ran Out of Food in the Last Year: Never true   Transportation Needs:    • Lack of Transportation (Medical):    • Lack of Transportation (Non-Medical):     Physical Activity:    • Days of Exercise per Week:    • Minutes of Exercise per Session:    Stress:    • Feeling of Stress :    Social Connections:    • Frequency of Communication with Friends and Family:    • Frequency of Social Gatherings with Friends and Family:    • Attends [employer] Services:    • Active Member of Clubs or Organizations:    • Attends Club or Organization Meetings:    • Marital Status:    Intimate Partner Violence:    • Fear of Current or Ex-Partner:    • Emotionally Abused:    • Physically Abused:    • Sexually Abused:         Family History:   Family History   Problem Relation Age of Onset   • No Known Problems Paternal Grandfather    • No Known Problems Paternal Grandmother    • No Known Problems Maternal Grandmother    • No Known Problems Maternal Grandfather    • No Known Problems Biological Father    • No Known Problems Biological Mother    • No Known Problems Biological Brother    • No Known Problems Biological Sister        Allergies: Patient has no known allergies.    Prior to Admission medications    Medication Sig Start Date End Date Taking? Authorizing Provider   prenatal vits96/iron fum/folic (PRE-KOBI VITAMIN) 27 mg iron- 800 mcg tablet Take 1 tablet by mouth daily.    Provider, Historical, MD       Review of Systems  Constitutional: negative  Eyes: negative  Ears, nose, mouth, throat, and face: negative  Respiratory: negative  Cardiovascular: negative  Gastrointestinal: negative  Genitourinary:negative  Integument/breast: negative  Hematologic/lymphatic: negative  Musculoskeletal:negative  Neurological: negative  Behavioral/Psych: negative  Endocrine: negative    Objective         Exam:  General Appearance: Alert, cooperative, no acute distress  Lungs: Clear to auscultation bilaterally, respirations unlabored  Heart: Regular rate and rhythm, S1 and S2 normal, no murmur, rub or gallop  Abdomen: gravid, nontender  Genitalia: See vaginal exam  Extremities: no edema or calf  tenderness  Neurologic: grossly intact without focal deficits      Labs:  ABO   Date Value Ref Range Status   10/19/2018 A  Final     Rh Factor   Date Value Ref Range Status   10/19/2018 Positive  Final     Rubella IgG Scr   Date Value Ref Range Status   2018 immune  Final     Strep Gp B Cult/DNA Probe   Date Value Ref Range Status   2021 No growth at 18-24 hours  Final     Group B Streptococcus, PCR   Date Value Ref Range Status   2021 Negative Negative Final     Comment:     No Group B Streptococcus detected by PCR       Assessment/Plan     Sayda Thomason is a 30 y.o. female  at 37w3d for scheduled repeat   1. Preoperative course and instructions reviewed, wash given  2. Consents reviewed, risks discussed with patient, including increased risks in setting of repeat surgery, questions answered, consents signed  3. Preoperative labs upon admission  4. Preoperative antibiotics 30 min prior to OR  5. EFM/TOCO  6. Fetal PVC: inform peds at delivery    Anish Dominguez MD

## 2021-09-14 ENCOUNTER — HOSPITAL ENCOUNTER (OUTPATIENT)
Dept: PERINATAL CARE | Facility: HOSPITAL | Age: 31
Discharge: HOME | End: 2021-09-14
Attending: OBSTETRICS & GYNECOLOGY
Payer: COMMERCIAL

## 2021-09-14 ENCOUNTER — ROUTINE PRENATAL (OUTPATIENT)
Dept: OBSTETRICS AND GYNECOLOGY | Facility: CLINIC | Age: 31
End: 2021-09-14
Payer: COMMERCIAL

## 2021-09-14 VITALS — WEIGHT: 115.2 LBS | DIASTOLIC BLOOD PRESSURE: 62 MMHG | BODY MASS INDEX: 19.17 KG/M2 | SYSTOLIC BLOOD PRESSURE: 98 MMHG

## 2021-09-14 VITALS — SYSTOLIC BLOOD PRESSURE: 107 MMHG | DIASTOLIC BLOOD PRESSURE: 68 MMHG

## 2021-09-14 DIAGNOSIS — O09.93 SUPERVISION OF HIGH RISK PREGNANCY IN THIRD TRIMESTER: Primary | ICD-10-CM

## 2021-09-14 DIAGNOSIS — O36.8390 FETAL ARRHYTHMIA AFFECTING PREGNANCY, ANTEPARTUM: ICD-10-CM

## 2021-09-14 DIAGNOSIS — Z34.90 PREGNANCY, UNSPECIFIED GESTATIONAL AGE: ICD-10-CM

## 2021-09-14 DIAGNOSIS — Z3A.37 37 WEEKS GESTATION OF PREGNANCY: ICD-10-CM

## 2021-09-14 PROCEDURE — 59025 FETAL NON-STRESS TEST: CPT

## 2021-09-14 PROCEDURE — 0502F SUBSEQUENT PRENATAL CARE: CPT | Performed by: OBSTETRICS & GYNECOLOGY

## 2021-09-14 PROCEDURE — 81002 URINALYSIS NONAUTO W/O SCOPE: CPT | Performed by: OBSTETRICS & GYNECOLOGY

## 2021-09-17 ENCOUNTER — HOSPITAL ENCOUNTER (OUTPATIENT)
Dept: PERINATAL CARE | Facility: HOSPITAL | Age: 31
Discharge: HOME | End: 2021-09-17
Attending: OBSTETRICS & GYNECOLOGY
Payer: COMMERCIAL

## 2021-09-17 VITALS — DIASTOLIC BLOOD PRESSURE: 67 MMHG | SYSTOLIC BLOOD PRESSURE: 108 MMHG

## 2021-09-17 DIAGNOSIS — O36.8390 ABNORMALITY IN FETAL HEART RATE/RHYTHM, ANTEPARTUM CONDITION OR COMPLICATION: ICD-10-CM

## 2021-09-17 DIAGNOSIS — O09.93 SUPERVISION OF HIGH RISK PREGNANCY IN THIRD TRIMESTER: Primary | ICD-10-CM

## 2021-09-17 DIAGNOSIS — Z3A.38 38 WEEKS GESTATION OF PREGNANCY: ICD-10-CM

## 2021-09-17 PROCEDURE — 59025 FETAL NON-STRESS TEST: CPT

## 2021-09-20 NOTE — PRENATAL NOTE
No complaints: transfer from Dr Ministerio Whitney counts and labor precautions reviewed  PNL reviewed: NIPT/AFP and [place] US normal  GTT normal and HGB 12.9  Hx LT csxn: desires RPT #3 at term  Tdap given  US at [facility] scheduled 8/10/21 weeks for Hx LT csxn x2 : Breech 4#11 (53%)  Fetal PVC: Resolved today: RNST and will inform peds at delivery  Cont 2x/week NST  RPT US at [facility] 8/31/21 VTX 5#14 (45%)  GBS NEG; HGB stable at 12 and   Consents and HP  in computer: f/u Monday for RPT csx as scheduled

## 2021-09-21 ENCOUNTER — HOSPITAL ENCOUNTER (OUTPATIENT)
Dept: PERINATAL CARE | Facility: HOSPITAL | Age: 31
Discharge: HOME | End: 2021-09-21
Attending: OBSTETRICS & GYNECOLOGY
Payer: COMMERCIAL

## 2021-09-21 ENCOUNTER — ROUTINE PRENATAL (OUTPATIENT)
Dept: OBSTETRICS AND GYNECOLOGY | Facility: CLINIC | Age: 31
End: 2021-09-21
Payer: COMMERCIAL

## 2021-09-21 VITALS — SYSTOLIC BLOOD PRESSURE: 99 MMHG | WEIGHT: 116.2 LBS | BODY MASS INDEX: 19.34 KG/M2 | DIASTOLIC BLOOD PRESSURE: 62 MMHG

## 2021-09-21 VITALS — SYSTOLIC BLOOD PRESSURE: 103 MMHG | DIASTOLIC BLOOD PRESSURE: 55 MMHG

## 2021-09-21 DIAGNOSIS — O36.8390 FETAL ARRHYTHMIA AFFECTING PREGNANCY, ANTEPARTUM: Primary | ICD-10-CM

## 2021-09-21 DIAGNOSIS — Z3A.38 38 WEEKS GESTATION OF PREGNANCY: ICD-10-CM

## 2021-09-21 DIAGNOSIS — O36.8390 FETAL ARRHYTHMIA AFFECTING PREGNANCY, ANTEPARTUM: ICD-10-CM

## 2021-09-21 DIAGNOSIS — Z34.90 PREGNANCY, UNSPECIFIED GESTATIONAL AGE: ICD-10-CM

## 2021-09-21 PROCEDURE — 59425 ANTEPARTUM CARE ONLY: CPT | Performed by: OBSTETRICS & GYNECOLOGY

## 2021-09-21 PROCEDURE — 0502F SUBSEQUENT PRENATAL CARE: CPT | Performed by: OBSTETRICS & GYNECOLOGY

## 2021-09-21 PROCEDURE — 81002 URINALYSIS NONAUTO W/O SCOPE: CPT | Performed by: OBSTETRICS & GYNECOLOGY

## 2021-09-21 PROCEDURE — 59025 FETAL NON-STRESS TEST: CPT

## 2021-09-24 ENCOUNTER — HOSPITAL ENCOUNTER (OUTPATIENT)
Dept: PERINATAL CARE | Facility: HOSPITAL | Age: 31
Discharge: HOME | End: 2021-09-24
Attending: OBSTETRICS & GYNECOLOGY
Payer: COMMERCIAL

## 2021-09-24 ENCOUNTER — ANESTHESIA EVENT (INPATIENT)
Dept: OBSTETRICS AND GYNECOLOGY | Facility: HOSPITAL | Age: 31
End: 2021-09-24
Payer: COMMERCIAL

## 2021-09-24 VITALS — SYSTOLIC BLOOD PRESSURE: 118 MMHG | DIASTOLIC BLOOD PRESSURE: 62 MMHG

## 2021-09-24 DIAGNOSIS — Z3A.39 39 WEEKS GESTATION OF PREGNANCY: ICD-10-CM

## 2021-09-24 DIAGNOSIS — O36.8390 ABNORMALITY IN FETAL HEART RATE/RHYTHM, ANTEPARTUM CONDITION OR COMPLICATION: Primary | ICD-10-CM

## 2021-09-24 PROCEDURE — 76816 OB US FOLLOW-UP PER FETUS: CPT

## 2021-09-27 ENCOUNTER — HOSPITAL ENCOUNTER (INPATIENT)
Facility: HOSPITAL | Age: 31
LOS: 3 days | Discharge: HOME | End: 2021-09-30
Attending: OBSTETRICS & GYNECOLOGY | Admitting: OBSTETRICS & GYNECOLOGY
Payer: COMMERCIAL

## 2021-09-27 ENCOUNTER — ANESTHESIA (INPATIENT)
Dept: OBSTETRICS AND GYNECOLOGY | Facility: HOSPITAL | Age: 31
End: 2021-09-27
Payer: COMMERCIAL

## 2021-09-27 LAB
ABO + RH BLD: NORMAL
BLD GP AB SCN SERPL QL: NEGATIVE
D AG BLD QL: POSITIVE
ERYTHROCYTE [DISTWIDTH] IN BLOOD BY AUTOMATED COUNT: 12.6 % (ref 11.7–14.4)
HCT VFR BLDCO AUTO: 44.5 % (ref 35–45)
HGB BLD-MCNC: 15.4 G/DL (ref 11.8–15.7)
LABORATORY COMMENT REPORT: NORMAL
MCH RBC QN AUTO: 32.7 PG (ref 28–33.2)
MCHC RBC AUTO-ENTMCNC: 34.6 G/DL (ref 32.2–35.5)
MCV RBC AUTO: 94.5 FL (ref 83–98)
PDW BLD AUTO: 10.9 FL (ref 9.4–12.3)
PLATELET # BLD AUTO: 160 K/UL (ref 150–369)
RBC # BLD AUTO: 4.71 M/UL (ref 3.93–5.22)
SARS-COV-2 RNA RESP QL NAA+PROBE: NEGATIVE
SPECIMEN EXP DATE BLD: NORMAL
T PALLIDUM AB SER QL IF: NONREACTIVE
WBC # BLD AUTO: 6.31 K/UL (ref 3.8–10.5)

## 2021-09-27 PROCEDURE — 63600000 HC DRUGS/DETAIL CODE: Performed by: INTERNAL MEDICINE

## 2021-09-27 PROCEDURE — 25000000 HC PHARMACY GENERAL: Performed by: OBSTETRICS & GYNECOLOGY

## 2021-09-27 PROCEDURE — U0003 INFECTIOUS AGENT DETECTION BY NUCLEIC ACID (DNA OR RNA); SEVERE ACUTE RESPIRATORY SYNDROME CORONAVIRUS 2 (SARS-COV-2) (CORONAVIRUS DISEASE [COVID-19]), AMPLIFIED PROBE TECHNIQUE, MAKING USE OF HIGH THROUGHPUT TECHNOLOGIES AS DESCRIBED BY CMS-2020-01-R: HCPCS | Performed by: OBSTETRICS & GYNECOLOGY

## 2021-09-27 PROCEDURE — 71000011 HC PACU PHASE 1 EA ADDL MIN: Performed by: OBSTETRICS & GYNECOLOGY

## 2021-09-27 PROCEDURE — 63700000 HC SELF-ADMINISTRABLE DRUG: Performed by: OBSTETRICS & GYNECOLOGY

## 2021-09-27 PROCEDURE — 71000001 HC PACU PHASE 1 INITIAL 30MIN: Performed by: OBSTETRICS & GYNECOLOGY

## 2021-09-27 PROCEDURE — 36415 COLL VENOUS BLD VENIPUNCTURE: CPT | Performed by: OBSTETRICS & GYNECOLOGY

## 2021-09-27 PROCEDURE — 59515 CESAREAN DELIVERY: CPT | Performed by: OBSTETRICS & GYNECOLOGY

## 2021-09-27 PROCEDURE — 59025 FETAL NON-STRESS TEST: CPT | Mod: 26,XE | Performed by: OBSTETRICS & GYNECOLOGY

## 2021-09-27 PROCEDURE — 25800000 HC PHARMACY IV SOLUTIONS: Performed by: OBSTETRICS & GYNECOLOGY

## 2021-09-27 PROCEDURE — 86780 TREPONEMA PALLIDUM: CPT | Performed by: OBSTETRICS & GYNECOLOGY

## 2021-09-27 PROCEDURE — 25800000 HC PHARMACY IV SOLUTIONS: Performed by: INTERNAL MEDICINE

## 2021-09-27 PROCEDURE — 25000000 HC PHARMACY GENERAL: Performed by: INTERNAL MEDICINE

## 2021-09-27 PROCEDURE — 63600000 HC DRUGS/DETAIL CODE: Performed by: OBSTETRICS & GYNECOLOGY

## 2021-09-27 PROCEDURE — 85027 COMPLETE CBC AUTOMATED: CPT | Performed by: OBSTETRICS & GYNECOLOGY

## 2021-09-27 PROCEDURE — 37000010 HC ANESTHESIA SPINAL: Performed by: OBSTETRICS & GYNECOLOGY

## 2021-09-27 PROCEDURE — 72000021 HC C SECTION LEVEL 1: Performed by: OBSTETRICS & GYNECOLOGY

## 2021-09-27 PROCEDURE — 12000000 HC ROOM AND CARE MED/SURG

## 2021-09-27 PROCEDURE — 86900 BLOOD TYPING SEROLOGIC ABO: CPT

## 2021-09-27 RX ORDER — METHYLERGONOVINE MALEATE 0.2 MG/ML
200 INJECTION INTRAVENOUS ONCE AS NEEDED
Status: CANCELLED | OUTPATIENT
Start: 2021-09-27

## 2021-09-27 RX ORDER — DEXTROSE 40 %
15-30 GEL (GRAM) ORAL AS NEEDED
Status: CANCELLED | OUTPATIENT
Start: 2021-09-27

## 2021-09-27 RX ORDER — CEFAZOLIN SODIUM 2 G/50ML
2 SOLUTION INTRAVENOUS
Status: COMPLETED | OUTPATIENT
Start: 2021-09-27 | End: 2021-09-27

## 2021-09-27 RX ORDER — CARBOPROST TROMETHAMINE 250 UG/ML
250 INJECTION, SOLUTION INTRAMUSCULAR ONCE AS NEEDED
Status: CANCELLED | OUTPATIENT
Start: 2021-09-27

## 2021-09-27 RX ORDER — DIBUCAINE 1 %
1 OINTMENT (GRAM) TOPICAL AS NEEDED
Status: DISCONTINUED | OUTPATIENT
Start: 2021-09-27 | End: 2021-09-30 | Stop reason: HOSPADM

## 2021-09-27 RX ORDER — DIPHENHYDRAMINE HCL 25 MG
25 CAPSULE ORAL EVERY 6 HOURS PRN
Status: DISCONTINUED | OUTPATIENT
Start: 2021-09-27 | End: 2021-09-30 | Stop reason: HOSPADM

## 2021-09-27 RX ORDER — POLYETHYLENE GLYCOL 3350 17 G/17G
17 POWDER, FOR SOLUTION ORAL DAILY PRN
Status: DISCONTINUED | OUTPATIENT
Start: 2021-09-27 | End: 2021-09-30 | Stop reason: HOSPADM

## 2021-09-27 RX ORDER — IBUPROFEN 200 MG
16-32 TABLET ORAL AS NEEDED
Status: CANCELLED | OUTPATIENT
Start: 2021-09-27

## 2021-09-27 RX ORDER — MISOPROSTOL 200 UG/1
1000 TABLET ORAL ONCE AS NEEDED
Status: CANCELLED | OUTPATIENT
Start: 2021-09-27

## 2021-09-27 RX ORDER — IBUPROFEN 600 MG/1
600 TABLET ORAL
Status: DISCONTINUED | OUTPATIENT
Start: 2021-09-29 | End: 2021-09-28

## 2021-09-27 RX ORDER — DIPHENHYDRAMINE HCL 50 MG/ML
25 VIAL (ML) INJECTION EVERY 6 HOURS PRN
Status: DISCONTINUED | OUTPATIENT
Start: 2021-09-27 | End: 2021-09-30 | Stop reason: HOSPADM

## 2021-09-27 RX ORDER — ONDANSETRON 4 MG/1
4 TABLET, ORALLY DISINTEGRATING ORAL EVERY 8 HOURS PRN
Status: DISCONTINUED | OUTPATIENT
Start: 2021-09-27 | End: 2021-09-30 | Stop reason: HOSPADM

## 2021-09-27 RX ORDER — OXYTOCIN 10 [USP'U]/ML
10 INJECTION, SOLUTION INTRAMUSCULAR; INTRAVENOUS ONCE AS NEEDED
Status: CANCELLED | OUTPATIENT
Start: 2021-09-27

## 2021-09-27 RX ORDER — OXYTOCIN/0.9 % SODIUM CHLORIDE 40/1000ML
PLASTIC BAG, INJECTION (ML) INTRAVENOUS CONTINUOUS
Status: ACTIVE | OUTPATIENT
Start: 2021-09-27 | End: 2021-09-27

## 2021-09-27 RX ORDER — OXYTOCIN/0.9 % SODIUM CHLORIDE 40/1000ML
500 PLASTIC BAG, INJECTION (ML) INTRAVENOUS ONCE
Status: COMPLETED | OUTPATIENT
Start: 2021-09-27 | End: 2021-09-27

## 2021-09-27 RX ORDER — KETOROLAC TROMETHAMINE 30 MG/ML
30 INJECTION, SOLUTION INTRAMUSCULAR; INTRAVENOUS
Status: DISCONTINUED | OUTPATIENT
Start: 2021-09-27 | End: 2021-09-28

## 2021-09-27 RX ORDER — PHENYLEPHRINE HYDROCHLORIDE 10 MG/ML
INJECTION INTRAVENOUS AS NEEDED
Status: DISCONTINUED | OUTPATIENT
Start: 2021-09-27 | End: 2021-09-27 | Stop reason: SURG

## 2021-09-27 RX ORDER — ACETAMINOPHEN 325 MG/1
975 TABLET ORAL ONCE
Status: COMPLETED | OUTPATIENT
Start: 2021-09-27 | End: 2021-09-27

## 2021-09-27 RX ORDER — ALUMINUM HYDROXIDE, MAGNESIUM HYDROXIDE, AND SIMETHICONE 1200; 120; 1200 MG/30ML; MG/30ML; MG/30ML
30 SUSPENSION ORAL EVERY 4 HOURS PRN
Status: DISCONTINUED | OUTPATIENT
Start: 2021-09-27 | End: 2021-09-30 | Stop reason: HOSPADM

## 2021-09-27 RX ORDER — MIDAZOLAM HYDROCHLORIDE 2 MG/2ML
INJECTION, SOLUTION INTRAMUSCULAR; INTRAVENOUS AS NEEDED
Status: DISCONTINUED | OUTPATIENT
Start: 2021-09-27 | End: 2021-09-27 | Stop reason: SURG

## 2021-09-27 RX ORDER — ACETAMINOPHEN 650 MG/1
650 SUPPOSITORY RECTAL ONCE
Status: COMPLETED | OUTPATIENT
Start: 2021-09-27 | End: 2021-09-27

## 2021-09-27 RX ORDER — DEXTROSE 50 % IN WATER (D50W) INTRAVENOUS SYRINGE
25 AS NEEDED
Status: CANCELLED | OUTPATIENT
Start: 2021-09-27

## 2021-09-27 RX ORDER — FENTANYL CITRATE 50 UG/ML
50 INJECTION, SOLUTION INTRAMUSCULAR; INTRAVENOUS
Status: CANCELLED | OUTPATIENT
Start: 2021-09-27

## 2021-09-27 RX ORDER — FENTANYL CITRATE 50 UG/ML
INJECTION, SOLUTION INTRAMUSCULAR; INTRAVENOUS AS NEEDED
Status: DISCONTINUED | OUTPATIENT
Start: 2021-09-27 | End: 2021-09-27 | Stop reason: SURG

## 2021-09-27 RX ORDER — ACETAMINOPHEN 650 MG/20.3ML
1000 LIQUID ORAL ONCE
Status: COMPLETED | OUTPATIENT
Start: 2021-09-27 | End: 2021-09-27

## 2021-09-27 RX ORDER — ACETAMINOPHEN 325 MG/1
975 TABLET ORAL
Status: DISCONTINUED | OUTPATIENT
Start: 2021-09-27 | End: 2021-09-30 | Stop reason: HOSPADM

## 2021-09-27 RX ORDER — OXYTOCIN/0.9 % SODIUM CHLORIDE 40/1000ML
PLASTIC BAG, INJECTION (ML) INTRAVENOUS AS NEEDED
Status: DISCONTINUED | OUTPATIENT
Start: 2021-09-27 | End: 2021-09-27 | Stop reason: SURG

## 2021-09-27 RX ORDER — HYDROMORPHONE HYDROCHLORIDE 1 MG/ML
0.5 INJECTION, SOLUTION INTRAMUSCULAR; INTRAVENOUS; SUBCUTANEOUS
Status: CANCELLED | OUTPATIENT
Start: 2021-09-27

## 2021-09-27 RX ORDER — EPHEDRINE SULFATE 50 MG/ML
INJECTION, SOLUTION INTRAVENOUS AS NEEDED
Status: DISCONTINUED | OUTPATIENT
Start: 2021-09-27 | End: 2021-09-27 | Stop reason: SURG

## 2021-09-27 RX ORDER — SODIUM CHLORIDE, SODIUM GLUCONATE, SODIUM ACETATE, POTASSIUM CHLORIDE AND MAGNESIUM CHLORIDE 30; 37; 368; 526; 502 MG/100ML; MG/100ML; MG/100ML; MG/100ML; MG/100ML
INJECTION, SOLUTION INTRAVENOUS CONTINUOUS PRN
Status: DISCONTINUED | OUTPATIENT
Start: 2021-09-27 | End: 2021-09-27 | Stop reason: SURG

## 2021-09-27 RX ORDER — OXYCODONE HYDROCHLORIDE 5 MG/1
5-10 TABLET ORAL EVERY 4 HOURS PRN
Status: DISCONTINUED | OUTPATIENT
Start: 2021-09-27 | End: 2021-09-30 | Stop reason: HOSPADM

## 2021-09-27 RX ORDER — AMOXICILLIN 250 MG
1 CAPSULE ORAL 2 TIMES DAILY
Status: DISCONTINUED | OUTPATIENT
Start: 2021-09-27 | End: 2021-09-30 | Stop reason: HOSPADM

## 2021-09-27 RX ORDER — CALCIUM CARBONATE 200(500)MG
500 TABLET,CHEWABLE ORAL EVERY 4 HOURS PRN
Status: DISCONTINUED | OUTPATIENT
Start: 2021-09-27 | End: 2021-09-30 | Stop reason: HOSPADM

## 2021-09-27 RX ORDER — BUPIVACAINE HYDROCHLORIDE 7.5 MG/ML
INJECTION, SOLUTION INTRASPINAL
Status: COMPLETED | OUTPATIENT
Start: 2021-09-27 | End: 2021-09-27

## 2021-09-27 RX ORDER — MORPHINE SULFATE 0.5 MG/ML
INJECTION, SOLUTION EPIDURAL; INTRATHECAL; INTRAVENOUS
Status: COMPLETED | OUTPATIENT
Start: 2021-09-27 | End: 2021-09-27

## 2021-09-27 RX ORDER — SODIUM CITRATE AND CITRIC ACID MONOHYDRATE 334; 500 MG/5ML; MG/5ML
30 SOLUTION ORAL ONCE
Status: COMPLETED | OUTPATIENT
Start: 2021-09-27 | End: 2021-09-27

## 2021-09-27 RX ORDER — DIPHENHYDRAMINE HCL 50 MG/ML
12.5 VIAL (ML) INJECTION EVERY 6 HOURS PRN
Status: ACTIVE | OUTPATIENT
Start: 2021-09-27 | End: 2021-09-28

## 2021-09-27 RX ORDER — BISACODYL 10 MG/1
10 SUPPOSITORY RECTAL DAILY PRN
Status: DISCONTINUED | OUTPATIENT
Start: 2021-09-27 | End: 2021-09-30 | Stop reason: HOSPADM

## 2021-09-27 RX ORDER — GLYCOPYRROLATE 0.6MG/3ML
SYRINGE (ML) INTRAVENOUS AS NEEDED
Status: DISCONTINUED | OUTPATIENT
Start: 2021-09-27 | End: 2021-09-27 | Stop reason: SURG

## 2021-09-27 RX ORDER — SODIUM CHLORIDE, SODIUM LACTATE, POTASSIUM CHLORIDE, CALCIUM CHLORIDE 600; 310; 30; 20 MG/100ML; MG/100ML; MG/100ML; MG/100ML
150 INJECTION, SOLUTION INTRAVENOUS CONTINUOUS
Status: DISCONTINUED | OUTPATIENT
Start: 2021-09-27 | End: 2021-09-27 | Stop reason: HOSPADM

## 2021-09-27 RX ORDER — ONDANSETRON HYDROCHLORIDE 2 MG/ML
4 INJECTION, SOLUTION INTRAVENOUS EVERY 8 HOURS PRN
Status: DISCONTINUED | OUTPATIENT
Start: 2021-09-27 | End: 2021-09-30 | Stop reason: HOSPADM

## 2021-09-27 RX ORDER — ONDANSETRON 8 MG/1
8 TABLET, ORALLY DISINTEGRATING ORAL ONCE
Status: COMPLETED | OUTPATIENT
Start: 2021-09-27 | End: 2021-09-27

## 2021-09-27 RX ADMIN — Medication 500 ML: at 12:23

## 2021-09-27 RX ADMIN — MORPHINE SULFATE 0.2 MG: 0.5 INJECTION, SOLUTION EPIDURAL; INTRATHECAL; INTRAVENOUS at 12:01

## 2021-09-27 RX ADMIN — FENTANYL CITRATE 25 MCG: 50 INJECTION, SOLUTION INTRAMUSCULAR; INTRAVENOUS at 12:34

## 2021-09-27 RX ADMIN — PHENYLEPHRINE HYDROCHLORIDE 100 MCG: 10 INJECTION INTRAVENOUS at 12:18

## 2021-09-27 RX ADMIN — MIDAZOLAM HYDROCHLORIDE 1 MG: 1 INJECTION, SOLUTION INTRAMUSCULAR; INTRAVENOUS at 11:54

## 2021-09-27 RX ADMIN — DEXTROSE MONOHYDRATE 2 G: 5 INJECTION, SOLUTION INTRAVENOUS at 11:23

## 2021-09-27 RX ADMIN — ACETAMINOPHEN 975 MG: 325 TABLET, FILM COATED ORAL at 17:08

## 2021-09-27 RX ADMIN — SODIUM CHLORIDE, SODIUM GLUCONATE, SODIUM ACETATE, POTASSIUM CHLORIDE AND MAGNESIUM CHLORIDE: 526; 502; 368; 37; 30 INJECTION, SOLUTION INTRAVENOUS at 12:04

## 2021-09-27 RX ADMIN — Medication: at 12:53

## 2021-09-27 RX ADMIN — ONDANSETRON 8 MG: 8 TABLET, ORALLY DISINTEGRATING ORAL at 11:09

## 2021-09-27 RX ADMIN — EPHEDRINE SULFATE 5 MG: 50 INJECTION, SOLUTION INTRAVENOUS at 12:12

## 2021-09-27 RX ADMIN — SODIUM CHLORIDE, POTASSIUM CHLORIDE, SODIUM LACTATE AND CALCIUM CHLORIDE 150 ML/HR: 600; 310; 30; 20 INJECTION, SOLUTION INTRAVENOUS at 09:48

## 2021-09-27 RX ADMIN — KETOROLAC TROMETHAMINE 30 MG: 30 INJECTION, SOLUTION INTRAMUSCULAR; INTRAVENOUS at 14:05

## 2021-09-27 RX ADMIN — PHENYLEPHRINE HYDROCHLORIDE 100 MCG: 10 INJECTION INTRAVENOUS at 12:24

## 2021-09-27 RX ADMIN — SODIUM CHLORIDE, SODIUM GLUCONATE, SODIUM ACETATE, POTASSIUM CHLORIDE AND MAGNESIUM CHLORIDE: 30; 37; 368; 526; 502 INJECTION, SOLUTION INTRAVENOUS at 11:53

## 2021-09-27 RX ADMIN — ACETAMINOPHEN 975 MG: 325 TABLET, FILM COATED ORAL at 11:10

## 2021-09-27 RX ADMIN — MIDAZOLAM HYDROCHLORIDE 1 MG: 1 INJECTION, SOLUTION INTRAMUSCULAR; INTRAVENOUS at 12:26

## 2021-09-27 RX ADMIN — DOCUSATE SODIUM 50 MG AND SENNOSIDES 8.6 MG 1 TABLET: 8.6; 5 TABLET, FILM COATED ORAL at 20:11

## 2021-09-27 RX ADMIN — Medication 20 UNITS: at 12:23

## 2021-09-27 RX ADMIN — PHENYLEPHRINE HYDROCHLORIDE 100 MCG: 10 INJECTION INTRAVENOUS at 12:07

## 2021-09-27 RX ADMIN — SODIUM CHLORIDE, SODIUM GLUCONATE, SODIUM ACETATE, POTASSIUM CHLORIDE AND MAGNESIUM CHLORIDE: 526; 502; 368; 37; 30 INJECTION, SOLUTION INTRAVENOUS at 12:38

## 2021-09-27 RX ADMIN — SODIUM CITRATE AND CITRIC ACID MONOHYDRATE 30 ML: 500; 334 SOLUTION ORAL at 11:10

## 2021-09-27 RX ADMIN — GLYCOPYRROLATE 0.1 MG: 0.2 INJECTION, SOLUTION INTRAMUSCULAR; INTRAVITREAL at 11:54

## 2021-09-27 RX ADMIN — ACETAMINOPHEN 975 MG: 325 TABLET, FILM COATED ORAL at 23:03

## 2021-09-27 RX ADMIN — PHENYLEPHRINE HYDROCHLORIDE 100 MCG: 10 INJECTION INTRAVENOUS at 12:02

## 2021-09-27 RX ADMIN — KETOROLAC TROMETHAMINE 30 MG: 30 INJECTION, SOLUTION INTRAMUSCULAR; INTRAVENOUS at 20:11

## 2021-09-27 RX ADMIN — BUPIVACAINE HYDROCHLORIDE IN DEXTROSE 1.6 ML: 7.5 INJECTION, SOLUTION SUBARACHNOID at 12:01

## 2021-09-27 ASSESSMENT — PATIENT HEALTH QUESTIONNAIRE - PHQ9: SUM OF ALL RESPONSES TO PHQ9 QUESTIONS 1 & 2: 0

## 2021-09-27 NOTE — PLAN OF CARE
Plan of Care Review  Plan of Care Reviewed With: patient, spouse  Progress: improving  Outcome Summary: patient is resting and will try and breastfeed tomorrow

## 2021-09-27 NOTE — ANESTHESIA PREPROCEDURE EVALUATION
Anesthesia ROS/MED HX    Anesthesia History    Previous anesthetics  No history of anesthetic complications  Pulmonary - neg  Cardiovascular   Covid19 Test Reviewed      Relevant Problems   No relevant active problems       Physical Exam    Airway   Mallampati: I   TM distance: >3 FB   Neck ROM: full        Anesthesia Plan    Plan: spinal    Technique: spinal     Lines and Monitors: PIV     Airway: natural airway / supplemental oxygen   ASA 2  Blood Products:     Use of Blood Products Discussed: Yes     Consented to blood products  Anesthetic plan and risks discussed with: patient  Postop Plan:   Patient Disposition: inpatient floor planned admission   Pain Management: spinal and IV analgesics

## 2021-09-27 NOTE — PROGRESS NOTES
Check-in Note    Subjective     Sayda Thomason is a 30 y.o.  at 39w3d who is presenting for a scheduled repeat . The patient denies VB, LOF,ctx. Reports +FM. Patient denies any interval changes to H&P.     Objective     BP 90/53 HR 55    Fetal Monitoring:  FHR Baseline: 120  FHR Variability: moderate  FHR Accelerations: present  FHR Decelerations: absent    Contraction Frequency: quiet    Bedside ultrasound: cephalic    Assessment/Plan     Sayda Thomason is a 30 y.o.  at 39w3d who is presenting for a RCS.    1. FHR: Reactive  2. GBS: negative  3. RCS: Admit to L&D, collect routine labs/COVID testing, start IVF. 2g Ancef on call to OR.     Lorene Packer MD

## 2021-09-27 NOTE — OP NOTE
OB  Delivery OP Note    Date of Procedure: 2021  Patient:Sayda Thomason  :1990    Procedure:     SECTION  CPT(R) Code:  34167 - [place] FULL ROUT OBSTE CARE, DELIV    Review the Delivery Report for details.      Details    Pre-Op Diagnosis: 1. 30 y.o.  intrauterine pregnancy at 39w3d with long gestation.  2. GBS negative   3. History of two prior c-sections    Post-Op Diagnosis: 1. Same  2.  delivery of a Live female     Procedure: repeat  , Low Transverse  via low transverse uterine incision and Pfannenstiel skin incision.   Anesthesia: Spinal    Findings: Normal uterus, tubes, and ovaries.   EBL  800 mL   Complications: None     Specimen Information:  * No specimens in log *  Drains: Santoro draining clear    Staff:  Surgeon(s):  Anish Dominguez MD Greben[place]uk, Ekaterina, MD Mian, Sharmeen, MD  Anesthesiologist: Pierce Polk MD   Circulator: Charisse Portillo RN  Scrub Person: Kiana Renteria TECH  Baby Nurse: Malka Jones RN  Other Staff:  RONAK AVILA;CHARISSE PORTILLO;MALKA JONES;PIERCE POLK  Delivery Assist;Delivery Nurse;Nursery Nurse;Anesthesiologist     INFANT INFORMATION  Time of Birth:12:22 PM   Presentation: Vertex   Position:Left ,Occiput ,Anterior ,   Cord: 3 vessels ,Complications:None    Sex: female   [place] Weight: 3.09 kg (6 lb 13 oz)      1 Minute 5 Minute   Apgar Totals: 9   9       Informed Consent:  Sayda Thomason is a 30 y.o.  at 39w3d who presented to [facility] for a scheduled repeat . An informed consent was obtained in the office and re-confirmed on admission.     The patient was taken to the operating room where Spinal  anesthesia was placed and found to be adequate. Antibiotics were given for infection prophylaxis. The patient was prepped and draped in the normal sterile fashion.  A timeout was performed.     An elliptical skin incision  was made over the previous  scar with the scalpel. The scar tissue was then excised using the bovie. The incision was carried down to the fascia using the bovie. The fascia was incised and this was extended laterally with the bovie. The fascia was grasped with Kocher clamps and the underlying rectus muscle was dissected off.  The rectus muscles were  bluntly. The peritoneum was identified and entered bluntly. The peritoneum was dissected to allow for adequate visualization.    The bladder blade was inserted. The vesicouterine peritoneum was identified. The lower uterine segment was then incised with a low transverse  incision and was extended bluntly. The amniotic sac was ruptured and Clear  fluid noted. The bladder blade was removed and the infant's head was delivered atraumatically using the usual maneuvers. The remainder of the infant was delivered, a spontaneous cry was heard, and the infant appeared to be moving all 4 extremities. After 60 seconds, the cord was clamped and cut, and the baby was handed off to the awaiting clinicians and neonatology staff.    The placenta was removed manually. The uterus was then exteriorized and cleared of all clots and debris. The hysterotomy was repaired with #1 Vicryl in a running locked fashion. A few figure of eight sutures of the same suture were placed along the hysterotomy and good hemostasis observed. The ovaries and tubes appeared normal bilaterally. The uterus was then returned to the abdomen. The uterine incision was reinspected and found to be hemostatic. The gutters were inspected and cleared of all debris. The fascia was then reapproximated with a running suture of #1 Vicryl. The subcutaneous tissue was irrigated, spot bleeders controlled with electrocautery, and the tissue was closed with 3-0 Vicryl Rapide in a simple interrupted fashion. The skin was closed with 4-0 Monocryl.     The patient tolerated the procedure well. The sponge, instrument,  and needle counts were correct and the patient was taken to recovery in stable condition.    Dr. Dominguez was present and scrubbed for the entire procedure.    Lorene Packer MD

## 2021-09-27 NOTE — H&P
Sayda Thomason is a 30 y.o. female  at 39w3d with an estimated due date of 10/1/2021, by Ultrasound for scheduled repeat  section #3: pt declines TOLAC     Good PNC c/w FETAL PVC:s/p fetal echo at [facility] with report in MEDIA        OB History:                    OB History    Para Term  AB Living   3 2 2 0 0 2   SAB TAB Ectopic Multiple Live Births      0 0 0 0 2          # Outcome Date GA Lbr Ron/2nd Weight Sex Delivery Anes PTL Lv   3 Current                     2 Term 10/19/18 39w2d   3060 g (6 lb 11.9 oz) F CS-LVertical Spinal N EAN      Name: JENNIFFER,GIRL      Apgar1: 8  Apgar5: 8   1 Term 16 40w0d   3289 g (7 lb 4 oz) F CS-LTranv     EAN      Complications: Other (comment), Breech birth         HISTORY; GYN [[id number]]     Medical History:   Medical History        Past Medical History:   Diagnosis Date   • Abnormal Pap smear of cervix              Surgical History:   Surgical History         Past Surgical History:   Procedure Laterality Date   •  SECTION         ,    • COLPOSCOPY       • WISDOM TOOTH EXTRACTION               Social History:   Social History   Social History      Socioeconomic History   • Marital status: Single       Spouse name: Not on file   • Number of children: Not on file   • Years of education: Not on file   • Highest education level: Not on file   Occupational History   • Not on file   Tobacco Use   • Smoking status: Never Smoker   • Smokeless tobacco: Never Used   Substance and Sexual Activity   • Alcohol use: No   • Drug use: No   • Sexual activity: Yes       Partners: Male       Birth control/protection: None   Other Topics Concern   • Not on file   Social History Narrative   • Not on file      Social Determinants of Health          Financial Resource Strain:    • Difficulty of Paying Living Expenses:    Food Insecurity: No Food Insecurity   • Worried About Running Out of Food in the Last Year: Never true   • Ran Out of Food in the  Last Year: Never true   Transportation Needs:    • Lack of Transportation (Medical):    • Lack of Transportation (Non-Medical):    Physical Activity:    • Days of Exercise per Week:    • Minutes of Exercise per Session:    Stress:    • Feeling of Stress :    Social Connections:    • Frequency of Communication with Friends and Family:    • Frequency of Social Gatherings with Friends and Family:    • Attends [employer] Services:    • Active Member of Clubs or Organizations:    • Attends Club or Organization Meetings:    • Marital Status:    Intimate Partner Violence:    • Fear of Current or Ex-Partner:    • Emotionally Abused:    • Physically Abused:    • Sexually Abused:             Family History:         Family History   Problem Relation Age of Onset   • No Known Problems Paternal Grandfather     • No Known Problems Paternal Grandmother     • No Known Problems Maternal Grandmother     • No Known Problems Maternal Grandfather     • No Known Problems Biological Father     • No Known Problems Biological Mother     • No Known Problems Biological Brother     • No Known Problems Biological Sister           Allergies: Patient has no known allergies.             Prior to Admission medications    Medication Sig Start Date End Date Taking? Authorizing Provider   prenatal vits96/iron fum/folic (PRE- VITAMIN) 27 mg iron- 800 mcg tablet Take 1 tablet by mouth daily.       Provider, Historical, MD         Review of Systems  Constitutional: negative  Eyes: negative  Ears, nose, mouth, throat, and face: negative  Respiratory: negative  Cardiovascular: negative  Gastrointestinal: negative  Genitourinary:negative  Integument/breast: negative  Hematologic/lymphatic: negative  Musculoskeletal:negative  Neurological: negative  Behavioral/Psych: negative  Endocrine: negative     Objective            Exam:  General Appearance: Alert, cooperative, no acute distress  Lungs: Clear to auscultation bilaterally, respirations  unlabored  Heart: Regular rate and rhythm, S1 and S2 normal, no murmur, rub or gallop  Abdomen: gravid, nontender  Genitalia: See vaginal exam  Extremities: no edema or calf tenderness  Neurologic: grossly intact without focal deficits        Labs:        ABO   Date Value Ref Range Status   10/19/2018 A   Final            Rh Factor   Date Value Ref Range Status   10/19/2018 Positive   Final            Rubella IgG Scr   Date Value Ref Range Status   2018 immune   Final            Strep Gp B Cult/DNA Probe   Date Value Ref Range Status   2021 No growth at 18-24 hours   Final              Group B Streptococcus, PCR   Date Value Ref Range Status   2021 Negative Negative Final       Comment:       No Group B Streptococcus detected by PCR         Assessment/Plan      Sayda Thomason is a 30 y.o. female  at 39w3d for scheduled repeat   1. Preoperative course and instructions reviewed, wash given  2. Consents reviewed, risks discussed with patient, including increased risks in setting of repeat surgery, questions answered, consents signed  3. Preoperative labs upon admission  4. Preoperative antibiotics 30 min prior to OR  5. EFM/TOCO  6. Fetal PVC: inform peds at delivery

## 2021-09-27 NOTE — ANESTHESIA PROCEDURE NOTES
Spinal Block    Patient location during procedure: OR  Start time: 9/27/2021 11:55 AM  End time: 9/27/2021 12:01 PM  Staffing  Performed: anesthesiologist   Anesthesiologist: Chika Dawn MD  Reason for block: primary anesthetic  Preanesthetic Checklist  Completed: patient identified, surgical consent, pre-op evaluation, timeout performed, IV checked, risks and benefits discussed, monitors and equipment checked and sterile field maintained during procedure  Spinal Block  Patient position: sitting  Prep: ChloraPrep  Patient monitoring: heart rate, continuous pulse ox and blood pressure  Approach: midline  Location: L4-5  Injection technique: single-shot  Needle  Needle type: Sprotte   Needle gauge: 25 G  Needle length: 3.5 in  Assessment  Sensory level: T4  Additional Notes  +Epi wash. Patient tolerated procedure well.   Medications Administered - 9/27/2021 12:01 PM   Bupivacaine (PF) (MARCAINE SPINAL) 0.75 % (7.5 mg/mL) injection, 1.6 mL  morphine (DURAMORPH) PF injection 0.5 mg/mL, 0.2 mg

## 2021-09-27 NOTE — ANESTHESIOLOGIST PRE-PROCEDURE ATTESTATION
Pre-Procedure Patient Identification:  I am the Primary Anesthesiologist and have identified the patient on 09/27/21 at 11:39 AM.   I have confirmed the procedure(s) will be performed by the following surgeon/proceduralist Anish Dominguez MD.

## 2021-09-28 LAB
ERYTHROCYTE [DISTWIDTH] IN BLOOD BY AUTOMATED COUNT: 13.1 % (ref 11.7–14.4)
HCT VFR BLDCO AUTO: 29.8 % (ref 35–45)
HGB BLD-MCNC: 10.1 G/DL (ref 11.8–15.7)
MCH RBC QN AUTO: 32.7 PG (ref 28–33.2)
MCHC RBC AUTO-ENTMCNC: 33.9 G/DL (ref 32.2–35.5)
MCV RBC AUTO: 96.4 FL (ref 83–98)
PDW BLD AUTO: 10.6 FL (ref 9.4–12.3)
PLATELET # BLD AUTO: 121 K/UL (ref 150–369)
RBC # BLD AUTO: 3.09 M/UL (ref 3.93–5.22)
WBC # BLD AUTO: 5.63 K/UL (ref 3.8–10.5)

## 2021-09-28 PROCEDURE — 85027 COMPLETE CBC AUTOMATED: CPT | Performed by: OBSTETRICS & GYNECOLOGY

## 2021-09-28 PROCEDURE — 63700000 HC SELF-ADMINISTRABLE DRUG: Performed by: OBSTETRICS & GYNECOLOGY

## 2021-09-28 PROCEDURE — 12000000 HC ROOM AND CARE MED/SURG

## 2021-09-28 PROCEDURE — 36415 COLL VENOUS BLD VENIPUNCTURE: CPT | Performed by: OBSTETRICS & GYNECOLOGY

## 2021-09-28 RX ORDER — IBUPROFEN 600 MG/1
600 TABLET ORAL
Status: DISCONTINUED | OUTPATIENT
Start: 2021-09-28 | End: 2021-09-30 | Stop reason: HOSPADM

## 2021-09-28 RX ADMIN — DOCUSATE SODIUM 50 MG AND SENNOSIDES 8.6 MG 1 TABLET: 8.6; 5 TABLET, FILM COATED ORAL at 09:14

## 2021-09-28 RX ADMIN — IBUPROFEN 600 MG: 600 TABLET ORAL at 09:14

## 2021-09-28 RX ADMIN — ACETAMINOPHEN 975 MG: 325 TABLET, FILM COATED ORAL at 17:51

## 2021-09-28 RX ADMIN — IBUPROFEN 600 MG: 600 TABLET ORAL at 14:50

## 2021-09-28 RX ADMIN — ACETAMINOPHEN 975 MG: 325 TABLET, FILM COATED ORAL at 11:33

## 2021-09-28 RX ADMIN — IBUPROFEN 600 MG: 600 TABLET ORAL at 20:28

## 2021-09-28 RX ADMIN — ACETAMINOPHEN 975 MG: 325 TABLET, FILM COATED ORAL at 05:41

## 2021-09-28 RX ADMIN — DIPHENHYDRAMINE HYDROCHLORIDE 25 MG: 25 CAPSULE ORAL at 00:03

## 2021-09-28 RX ADMIN — PRENATAL VITAMINS-IRON FUMARATE 27 MG IRON-FOLIC ACID 0.8 MG TABLET 1 TABLET: at 09:13

## 2021-09-28 ASSESSMENT — PAIN SCALES - GENERAL: PAIN_LEVEL: 0

## 2021-09-28 NOTE — ANESTHESIA POSTPROCEDURE EVALUATION
Patient: Sayda Thomason    Procedure Summary     Date: 21 Room / Location: [facility] L&D 1 / [facility] L&D OR    Anesthesia Start: 1153 Anesthesia Stop: 1315    Procedure:  SECTION (N/A Abdomen) Diagnosis:       Previous  delivery affecting pregnancy      (Previous  delivery affecting pregnancy [O34.219])    Surgeons: Anish Dominguez MD Responsible Provider: Chika Dawn MD    Anesthesia Type: spinal ASA Status: 2          Anesthesia Type: spinal  PACU Vitals  2021 1305 - 2021 1405      2021  1308 2021  1310 2021  1311 2021  1316    BP: 91/51 -- -- --    Temp: -- 36.6 °C (97.8 °F) -- --    Pulse: 71 -- 76 78    SpO2: -- -- 99 % 97 %              2021  1320 2021  1323 2021  1325 2021  1331    BP: -- 89/51 -- --    Temp: -- -- -- --    Pulse: 69 63 72 75    SpO2: 100 % -- 100 % 99 %              2021  1336 2021  1338 2021  1340 2021  1345    BP: -- 85/46 -- --    Temp: -- -- -- --    Pulse: 61 77 57 58    SpO2: 99 % -- 99 % 99 %              2021  1351 2021  1354 2021  1355 2021  1400    BP: -- 94/52 -- --    Temp: -- -- -- --    Pulse: 58 51 53 55    SpO2: 99 % -- 99 % 99 %              2021  1405             BP: --       Temp: --       Pulse: 56       SpO2: 99 %               Anesthesia Post Evaluation    Pain score: 0  Pain management: adequate  Mode of pain management: additional block medications  Patient location during evaluation: floor  Patient participation: complete - patient participated  Level of consciousness: awake and alert  Cardiovascular status: acceptable  Airway Patency: adequate  Respiratory status: acceptable  Hydration status: acceptable  Pain well controlled after spinal preservative free morphine administration: Yes  Continue 24 hours observation after preservative free morphine administration: No  Anesthetic complications: no

## 2021-09-28 NOTE — STUDENT
Medical Student Note: For educational purposes only.  Do not use for coding or billing.     Medical Student Daily Progress Note    Subjective     Interval History: no acute events overnight; no complaints    Denies HA, CP, SOB, N/V, F/C  Pain: well-controlled on medications  Diet: tolerating full diet  Ambulation: not ambulating currently  Voiding: shelton removed at 0600  Bleeding: minimal lochia  Bowel: no BM, passing flatus    Objective     Vital signs in last 24 hours:  Temp:  [35.7 °C (96.3 °F)-36.7 °C (98.1 °F)] 36.6 °C (97.8 °F)  Heart Rate:  [48-78] 55  Resp:  [16-20] 18  BP: ()/(39-65) 106/60    I&O    Intake/Output Summary (Last 24 hours) at 2021 0630  Last data filed at 2021 0541  Gross per 24 hour   Intake 1250 ml   Output 3475 ml   Net -2225 ml     Physical Exam:  General appearance: alert, cooperative and no distress  Lungs: clear to auscultation bilaterally  Heart: regular rate and rhythm, S1, S2 normal, no murmur, click, rub or gallop  Extremities: extremities normal, warm and well-perfused; no cyanosis, clubbing, or edema and symmetric and no edema    Labs  Labs reviewed  CBC Results       21     0934 1548 1525    WBC 6.31 5.57 6.1    RBC 4.71 3.63 3.83    HGB 15.4 12.0 12.9    HCT 44.5 33.9 38.6    MCV 94.5 93.4 100.8    MCH 32.7 33.1 33.7    MCHC 34.6 35.4 33.4     148 160         Comment for PLT at 1548 on 21: RESULTS CHECKED. SPECIMEN QUALITY CHECKED. RESULTS OBTAINED AFTER VORTEXING TO ELIMINATE PLT CLUMPS        Assessment/Plan     Sayda Larkin is a 31 yo  POD#1 s/p uncomplicated repeat .    *Vital signs: stable  *Pain: controlled w/ medications  *Hemodynamics: pre-op Hgb 15.4; AM CBC pending  *VTE ppx: ambulation encouraged  *Post-op milestones: meeting appropriate milestones; TOV required post shelton removal

## 2021-09-28 NOTE — PROGRESS NOTES
Obstetrics  Postpartum Progress Note    Events  Patient seen and examined. No acute events overnight.    Subjective  Denies HA, CP, SOB, N/V and F/C. Pain controlled. No complaints.  Bleeding: lochia minimal  Diet: taking regular diet  Voiding: shelton in place  Bowel: passing flatus  Ambulating: not ambulating    Vitals  Temp:  [35.7 °C (96.3 °F)-36.7 °C (98.1 °F)] 36.6 °C (97.8 °F)  Heart Rate:  [48-78] 55  Resp:  [16-20] 18  BP: ()/(39-65) 106/60    I&O    Intake/Output Summary (Last 24 hours) at 2021 0615  Last data filed at 2021 0541  Gross per 24 hour   Intake 1250 ml   Output 3475 ml   Net -2225 ml       Physical Exam  General: well  Heart: Regular rate and rhythm  Lungs: Clear to auscultation bilaterally  Abdomen: soft, nondistended, non-tender, no rebound/rigidity/guarding. Fundus firm at umbilicus.   Incision: dressing clean, dry, intact. Dressing removed, incision with steri-strips in place that were dry and intact.   Extremities: symmetric and no edema    Labs  Labs Reviewed:  Lab Results   Component Value Date    ABO A 2021    LABRH Positive 2021      CBC Results       21     0934 1548 1525    WBC 6.31 5.57 6.1    RBC 4.71 3.63 3.83    HGB 15.4 12.0 12.9    HCT 44.5 33.9 38.6    MCV 94.5 93.4 100.8    MCH 32.7 33.1 33.7    MCHC 34.6 35.4 33.4     148 160         Comment for PLT at 1548 on 21: RESULTS CHECKED. SPECIMEN QUALITY CHECKED. RESULTS OBTAINED AFTER VORTEXING TO ELIMINATE PLT CLUMPS        Rubella: immune    Assessment/Plan   Problem-based Assessment and Plan    Sayda Thomason is a 30 y.o.  POD#1 s/p repeat .    1. Vital Signs: stable  2. Hemodynamics: stable. Hgb 15.4 pre-op--->CBC pending. No signs or symptoms of acute anemia.   3. Pain: controlled  4. VTE Assessment: Continue SCDs. Encouraged ambulation at least 4 times daily.  5. Vaccinations/Rhogam: Rhogam not indicated  6. Post-op care: meeting appropriate  post-op milestones. Continue routine post-op care. Will be for TOV this morning after shelton removal.     Nereyda Carrera MD

## 2021-09-28 NOTE — LACTATION NOTE
3rd time mom is planning on doing both breast and bottle.  She was not feeling well and gave only bottles until this afternoon, and then reports that infant latched and bf well during her first feed at the breast. Reviewed positioning, milk supply, and feeding cues. Also reviewed hand expression and bf chapter in mother baby book. Has pump for home. Aware of out pt LC resources. Aware to call for help as needed. Questions answered and support given.

## 2021-09-28 NOTE — NURSING NOTE
At 0950 patient attempted to urinate. Was unsuccessful but felt dizzy while on the toilet. BP was 75/39, Pulse ox was 99% and heart rate was 60 bpm and she was pale. Gave patient apple juice and assisted back to bed. At 0956 BP was 103/59, pulse ox was 100% and heart rate 66. Paged Dr. Carrera, aware of BP and will let patient eat and try again in 1 hour. Pt is feeling the urge to urinate. Will continue to monitor.

## 2021-09-29 PROCEDURE — 63700000 HC SELF-ADMINISTRABLE DRUG: Performed by: OBSTETRICS & GYNECOLOGY

## 2021-09-29 PROCEDURE — 12000000 HC ROOM AND CARE MED/SURG

## 2021-09-29 RX ORDER — OXYCODONE HYDROCHLORIDE 5 MG/1
5-10 TABLET ORAL EVERY 4 HOURS PRN
Qty: 20 TABLET | Refills: 0 | Status: SHIPPED | OUTPATIENT
Start: 2021-09-29 | End: 2021-10-04

## 2021-09-29 RX ORDER — IBUPROFEN 600 MG/1
600 TABLET ORAL
Qty: 40 TABLET | Refills: 0 | Status: SHIPPED | OUTPATIENT
Start: 2021-09-29 | End: 2023-03-22

## 2021-09-29 RX ADMIN — ACETAMINOPHEN 975 MG: 325 TABLET, FILM COATED ORAL at 15:02

## 2021-09-29 RX ADMIN — ACETAMINOPHEN 975 MG: 325 TABLET, FILM COATED ORAL at 20:58

## 2021-09-29 RX ADMIN — ACETAMINOPHEN 975 MG: 325 TABLET, FILM COATED ORAL at 00:10

## 2021-09-29 RX ADMIN — IBUPROFEN 600 MG: 600 TABLET ORAL at 09:02

## 2021-09-29 RX ADMIN — OXYCODONE HYDROCHLORIDE 5 MG: 5 TABLET ORAL at 09:01

## 2021-09-29 RX ADMIN — IBUPROFEN 600 MG: 600 TABLET ORAL at 15:02

## 2021-09-29 RX ADMIN — DIPHENHYDRAMINE HYDROCHLORIDE 25 MG: 25 CAPSULE ORAL at 15:04

## 2021-09-29 RX ADMIN — DOCUSATE SODIUM 50 MG AND SENNOSIDES 8.6 MG 1 TABLET: 8.6; 5 TABLET, FILM COATED ORAL at 09:02

## 2021-09-29 RX ADMIN — PRENATAL VITAMINS-IRON FUMARATE 27 MG IRON-FOLIC ACID 0.8 MG TABLET 1 TABLET: at 09:03

## 2021-09-29 RX ADMIN — ACETAMINOPHEN 975 MG: 325 TABLET, FILM COATED ORAL at 05:59

## 2021-09-29 RX ADMIN — IBUPROFEN 600 MG: 600 TABLET ORAL at 02:19

## 2021-09-29 RX ADMIN — DIPHENHYDRAMINE HYDROCHLORIDE 25 MG: 25 CAPSULE ORAL at 23:41

## 2021-09-29 RX ADMIN — IBUPROFEN 600 MG: 600 TABLET ORAL at 20:58

## 2021-09-29 NOTE — PLAN OF CARE
Problem: Adult Inpatient Plan of Care  Goal: Plan of Care Review  Outcome: Progressing  Flowsheets (Taken 2021 041)  Progress: improving  Plan of Care Reviewed With:   patient   significant other  Outcome Summary: Pt recover well after , pain meds given. mostlly formula feeding, resting in bed.   Plan of Care Review  Plan of Care Reviewed With: patient, significant other  Progress: improving  Outcome Summary: Pt recover well after , pain meds given. mostlly formula feeding, resting in bed.

## 2021-09-29 NOTE — PROGRESS NOTES
Obstetrics  Postpartum Progress Note    Events  Patient seen and examined. No acute events overnight.    Subjective  Denies HA, CP, SOB, N/V and F/C. Pain controlled. No complaints.  Bleeding: lochia minimal  Diet: taking regular diet  Voiding: without difficulty  Bowel: passing flatus  Ambulating: as tolerated    Vitals  Temp:  [36.3 °C (97.3 °F)-37.2 °C (98.9 °F)] 37.2 °C (98.9 °F)  Heart Rate:  [53-73] 73  Resp:  [16-18] 16  BP: ()/(39-73) 114/64    I&O    Intake/Output Summary (Last 24 hours) at 2021 0618  Last data filed at 2021 1356  Gross per 24 hour   Intake --   Output 2000 ml   Net -2000 ml       Physical Exam  General: well  Heart: Regular rate and rhythm  Lungs: Clear to auscultation bilaterally  Abdomen: soft, nondistended, non-tender, no rebound/rigidity/guarding. Fundus firm at umbilicus.  Incision: no significant drainage, no dehiscence and no significant erythema. Steri-strips over incision.   Extremities: symmetric and no edema    Labs  Labs Reviewed:  Lab Results   Component Value Date    ABO A 2021    LABRH Positive 2021      CBC Results       21     0835 0934 1548    WBC 5.63 6.31 5.57    RBC 3.09 4.71 3.63    HGB 10.1 15.4 12.0    HCT 29.8 44.5 33.9    MCV 96.4 94.5 93.4    MCH 32.7 32.7 33.1    MCHC 33.9 34.6 35.4     160 148         Comment for PLT at 1548 on 21: RESULTS CHECKED. SPECIMEN QUALITY CHECKED. RESULTS OBTAINED AFTER VORTEXING TO ELIMINATE PLT CLUMPS        Rubella: immune    Assessment/Plan   Problem-based Assessment and Plan    Sayda Thomason is a 30 y.o.  POD#2 s/p repeat .    1. Vital Signs: stable  2. Hemodynamics: stable. Hgb 15.4 pre-op--->10.1. No signs or symptoms of acute anemia.   3. Pain: controlled  4. VTE Assessment: Encouraged ambulation at least 4 times daily.  5. Vaccinations/Rhogam: Rhogam not indicated  6. Post-op care: meeting appropriate post-op milestones. Continue routine  post-op care. Passed TOV.     Nereyda Carrera MD

## 2021-09-29 NOTE — LACTATION NOTE
"Per RN mom  Has decided to exclusively formula feed. Mom given pt booklet \"Your Guide to Formula Feeding Your Baby\", [facility] handout \"Formula Feeding Your Baby Safely\" and \"Breast Care\".    "

## 2021-09-30 VITALS
BODY MASS INDEX: 18.61 KG/M2 | TEMPERATURE: 97.4 F | DIASTOLIC BLOOD PRESSURE: 81 MMHG | HEART RATE: 55 BPM | OXYGEN SATURATION: 100 % | RESPIRATION RATE: 16 BRPM | WEIGHT: 111.7 LBS | HEIGHT: 65 IN | SYSTOLIC BLOOD PRESSURE: 120 MMHG

## 2021-09-30 PROCEDURE — 63700000 HC SELF-ADMINISTRABLE DRUG: Performed by: OBSTETRICS & GYNECOLOGY

## 2021-09-30 RX ADMIN — ACETAMINOPHEN 975 MG: 325 TABLET, FILM COATED ORAL at 03:17

## 2021-09-30 RX ADMIN — PRENATAL VITAMINS-IRON FUMARATE 27 MG IRON-FOLIC ACID 0.8 MG TABLET 1 TABLET: at 08:01

## 2021-09-30 RX ADMIN — DOCUSATE SODIUM 50 MG AND SENNOSIDES 8.6 MG 1 TABLET: 8.6; 5 TABLET, FILM COATED ORAL at 08:01

## 2021-09-30 RX ADMIN — IBUPROFEN 600 MG: 600 TABLET ORAL at 03:16

## 2021-09-30 RX ADMIN — ACETAMINOPHEN 975 MG: 325 TABLET, FILM COATED ORAL at 09:32

## 2021-09-30 RX ADMIN — IBUPROFEN 600 MG: 600 TABLET ORAL at 09:32

## 2021-09-30 NOTE — LACTATION NOTE
Mom is currently BF and states she plans to both formula feed and BF. Reviewed importance of breast stimulation at least 8x/24 hrs for milk production and to avoid engorgement. Mom did pump over night. +colostrum. Mom c/o sore nipples. Nipples red and R nipple compressed after feeding. Reviewed importance of deep latch. Shown deeper latch with good results. Reviewed sore nipple care. Has lanolin at bedside. Reviewed feeding plan.  Reviewed BF teaching for d/c. Engorgement strategies reviewed. Aware to feed at least q3 hrs and monitor diaper output. Questions answered and support given. Aware of out-pt LC resources. Encouraged out-pt LC f/u as needed.

## 2021-09-30 NOTE — PROGRESS NOTES
Obstetrics  Postpartum Progress Note    Events  Patient seen and examined. No acute events overnight.    Subjective  Denies HA, CP, SOB, N/V and F/C. Pain controlled. No complaints.  Bleeding: lochia minimal  Diet: taking regular diet  Voiding: without difficulty  Bowel: passing flatus no bowel movement  Ambulating: as tolerated    Vitals  Temp:  [36.3 °C (97.3 °F)-36.8 °C (98.2 °F)] 36.3 °C (97.3 °F)  Heart Rate:  [60-62] 62  Resp:  [16-18] 16  BP: (108-115)/(66-75) 108/75    Physical Exam  General: well  Heart: Regular rate and rhythm  Lungs: Clear to auscultation bilaterally  Abdomen: soft, nondistended, non-tender, no rebound/rigidity/guarding. Fundus firm at umbilicus.   Incision: healing well, no significant drainage, no dehiscence and no significant erythema. Steri-strips in place.   Extremities: symmetric and no edema    Labs  Labs Reviewed:  Lab Results   Component Value Date    ABO A 2021    LABRH Positive 2021      CBC Results       21     0835 0934 1548    WBC 5.63 6.31 5.57    RBC 3.09 4.71 3.63    HGB 10.1 15.4 12.0    HCT 29.8 44.5 33.9    MCV 96.4 94.5 93.4    MCH 32.7 32.7 33.1    MCHC 33.9 34.6 35.4     160 148         Comment for PLT at 1548 on 21: RESULTS CHECKED. SPECIMEN QUALITY CHECKED. RESULTS OBTAINED AFTER VORTEXING TO ELIMINATE PLT CLUMPS        Rubella: immune    Assessment/Plan   Problem-based Assessment and Plan    Sayda Thomason is a 30 y.o.  POD#3 s/p repeat .     1. Vital Signs: stable  2. Hemodynamics: stable. Hgb 15.4 pre-op--->10.1. No signs or symptoms of acute anemia.   3. Pain: controlled  4. VTE Assessment: Encouraged ambulation at least 4 times daily.  5. Vaccinations/Rhogam: Rhogam not indicated  6. Post-op care: meeting appropriate post-op milestones. Continue routine post-op care. Excited to be going home soon.     Nereyda Colette Carrera, MD

## 2021-09-30 NOTE — PLAN OF CARE
Problem: Adjustment to Role Transition (Postpartum  Delivery)  Goal: Successful Maternal Role Transition  Outcome: Progressing     Problem: Bleeding (Postpartum  Delivery)  Goal: Hemostasis  Outcome: Progressing      Pt progressing well. VSS. Fundus is firm with light amount of bleeding. Incision healing well. Pt complains of abdominal pain but is being managed with motrin and tylenol. Pt is voiding spontaneously without difficulty and is passing gas. H&H drawn. Hopeful for d/c today.

## 2021-10-01 NOTE — DISCHARGE SUMMARY
Inpatient Discharge Summary    BRIEF OVERVIEW  Admitting Provider: Anish Dominguez MD  Discharge Provider: No att. providers found  Primary Care Physician at Discharge: Pt States, No Pcp [phone]     Admission Date: 2021     Discharge Date: 2021    Primary Discharge Diagnosis  Previous  delivery affecting pregnancy    Secondary Discharge Diagnosis      Discharge Disposition  Home     Discharge Medications     Medication List      START taking these medications    ibuprofen 600 mg tablet  Commonly known as: MOTRIN  Take 1 tablet (600 mg total) by mouth every 6 (six) hours for 10 days.  Dose: 600 mg     oxyCODONE 5 mg immediate release tablet  Commonly known as: ROXICODONE  Take 1-2 tablets (5-10 mg total) by mouth every 4 (four) hours as needed for moderate pain for up to 5 days.  Dose: 5-10 mg        CONTINUE taking these medications    pre-hailey vitamin 27 mg iron- 800 mcg tablet  Take 1 tablet by mouth daily.  Dose: 1 tablet                Outpatient Follow-Up  Encounter Information    This patient does not currently have any appointments scheduled.         Test Results Pending at Discharge      DETAILS OF HOSPITAL STAY    Presenting Problem/History of Present Illness  Previous  delivery affecting pregnancy [O34.219]      Hospital Course/Operative Procedures Performed  Procedure(s):   SECTION

## 2021-10-04 ENCOUNTER — OFFICE VISIT (OUTPATIENT)
Dept: OBSTETRICS AND GYNECOLOGY | Facility: CLINIC | Age: 31
End: 2021-10-04
Payer: COMMERCIAL

## 2021-10-04 VITALS — DIASTOLIC BLOOD PRESSURE: 64 MMHG | TEMPERATURE: 97.6 F | SYSTOLIC BLOOD PRESSURE: 104 MMHG

## 2021-10-04 DIAGNOSIS — Z09 POSTOP CHECK: Primary | ICD-10-CM

## 2021-10-04 PROCEDURE — 99024 POSTOP FOLLOW-UP VISIT: CPT | Performed by: OBSTETRICS & GYNECOLOGY

## 2021-10-04 NOTE — PROGRESS NOTES
Pt s/p RPT LT csxn #3 and scar revision on 9/27/21  Pt reports that she was active this weekend with older children and today noticed bruising in her right labia  Pt also reports that she has been itching at site of her steri strips and noticed that they are leaving red marks    Pt denies FCNV  Ambulating and octavia diet and voiding with no diff  No d/c/pus or odor from incision    abd snt ffnt incision cdi  + erythema at site of steri strips otherwise no erythema/induration/odor/pus and skin edges CDI  + bruising in left labia but no fluctuance or masses palpated    A/p Wound check  Likely allergic rxn to steri strips  Steri strips removed and pt will use Hydrocortison and Benadryl cream x 1 week  Bruising from dependent blood collection  No masses/collections palpated  Pt given wound care precautions and will f/u in 1 week in office for f/u check

## 2021-10-14 ENCOUNTER — OFFICE VISIT (OUTPATIENT)
Dept: OBSTETRICS AND GYNECOLOGY | Facility: CLINIC | Age: 31
End: 2021-10-14
Payer: COMMERCIAL

## 2021-10-14 VITALS — SYSTOLIC BLOOD PRESSURE: 99 MMHG | TEMPERATURE: 98.3 F | DIASTOLIC BLOOD PRESSURE: 70 MMHG

## 2021-10-14 DIAGNOSIS — Z48.89 ENCOUNTER FOR POSTOPERATIVE WOUND CHECK: Primary | ICD-10-CM

## 2021-10-14 PROCEDURE — 99024 POSTOP FOLLOW-UP VISIT: CPT | Performed by: OBSTETRICS & GYNECOLOGY

## 2021-10-14 NOTE — PROGRESS NOTES
Pt s/p RPT LT csxn #3 and scar revision on 9/27/21  F/u for bruising in her right labia and steri strip reaction       Pt denies FCNV  Ambulating and octavia diet and voiding with no diff  No d/c/pus or odor from incision    Resolution of bruising and no complaints of pain/swelling  Reports that contact dermatitis from steris completely resolved       abd snt ffnt incision cdi  + erythema at site of steri strips otherwise no erythema/induration/odor/pus and skin edges CDI  + bruising in left labia but no fluctuance or masses palpated: bruising improved from previous eval 10/4/21     A/p Wound check  No erythema or induration of incision   Resolution of allergic reaction to steri strips with  Hydrocortison and Benadryl cream x 1 week  No masses/collections palpated  Pt given wound care precautions and will f/u in 3-4 weeks for full PP or prn if problems or questions

## 2021-11-05 ENCOUNTER — OFFICE VISIT (OUTPATIENT)
Dept: OBSTETRICS AND GYNECOLOGY | Facility: CLINIC | Age: 31
End: 2021-11-05
Payer: COMMERCIAL

## 2021-11-05 VITALS — SYSTOLIC BLOOD PRESSURE: 118 MMHG | TEMPERATURE: 97.8 F | DIASTOLIC BLOOD PRESSURE: 72 MMHG

## 2021-11-05 DIAGNOSIS — L76.82 PAIN AT SURGICAL INCISION: Primary | ICD-10-CM

## 2021-11-05 DIAGNOSIS — L03.311 CELLULITIS OF ABDOMINAL WALL: ICD-10-CM

## 2021-11-05 PROCEDURE — 99213 OFFICE O/P EST LOW 20 MIN: CPT | Performed by: STUDENT IN AN ORGANIZED HEALTH CARE EDUCATION/TRAINING PROGRAM

## 2021-11-05 RX ORDER — CEPHALEXIN 250 MG/1
250 CAPSULE ORAL 4 TIMES DAILY
Qty: 28 CAPSULE | Refills: 0 | Status: SHIPPED | OUTPATIENT
Start: 2021-11-05 | End: 2021-11-12

## 2021-11-05 RX ORDER — SULFAMETHOXAZOLE AND TRIMETHOPRIM 800; 160 MG/1; MG/1
1 TABLET ORAL 2 TIMES DAILY
Qty: 14 TABLET | Refills: 0 | Status: SHIPPED | OUTPATIENT
Start: 2021-11-05 | End: 2021-11-05 | Stop reason: ALTCHOICE

## 2021-11-05 NOTE — PROGRESS NOTES
OFFICE GYN - PROBLEM VISIT    HPI:  Sayda Thomason is presenting today with a CC of lump and pain above incision. She states that she has had slight tenderness above incision for a few days but most recently she has noticed increased swelling and tenderness. She denies fever/chill. She denies drainage from incision. She continues to have normal bowel movements. Denies N/V.    Past Medical History:  has a past medical history of Abnormal Pap smear of cervix.     Past Surgical History:  has a past surgical history that includes [place] tooth extraction (); Colposcopy; and  section.    Obstetric History:   OB History    Para Term  AB Living   3 3 3 0 0 3   SAB IAB Ectopic Multiple Live Births   0 0 0 0 3      # Outcome Date GA Lbr Ron/2nd Weight Sex Delivery Anes PTL Lv   3 Term 21 39w3d  3090 g (6 lb 13 oz) F CS-LTranv Spinal N EAN   2 Term 10/19/18 39w2d  3060 g (6 lb 11.9 oz) F CS-LVertical Spinal N EAN   1 Term 16 40w0d  3289 g (7 lb 4 oz) F CS-LTranv   EAN      Complications: Other (comment), Breech birth       Medications:   Current Outpatient Medications:   •  cephalexin 250 mg capsule, Take 1 capsule (250 mg total) by mouth 4 (four) times a day for 7 days., Disp: 28 capsule, Rfl: 0  •  ibuprofen (MOTRIN) 600 mg tablet, Take 1 tablet (600 mg total) by mouth every 6 (six) hours for 10 days., Disp: 40 tablet, Rfl: 0  •  prenatal vits96/iron fum/folic (PRE- VITAMIN) 27 mg iron- 800 mcg tablet, Take 1 tablet by mouth daily., Disp: , Rfl:       Allergies: has No Known Allergies.     Social History: Denies smoking, alcohol, drug use     Family History: Non-contributory    ROS: Negative except as detailed in HPI     Vital Signs for this encounter:   Visit Vitals  /72   Temp 36.6 °C (97.8 °F)       Constitutional: General appearance well-nourished  Neurologic/Psychiatric: Alert & oriented, mood/affect cooperative and appropriate, no acute distress  Neck: overall appearance  normal, normal thyroid, no tenderness/enlargement/masses  Respiratory: normal respiratory effort  Cardiovascular: no LE swelling, well perfused with no edema   Skin: no obvious rashes, lesions, ulcers except as detailed in GYN exam  Breast: Deferred  GI/Abdomen: Soft, nontender, nondistended; non-tender to palpation; no discreet lumps or evidence of herniation; incision well healed, erythema and swelling with slight induration superior to the incision and extending cephalad approximately 4cm for the entire length of the incision; no drainage from incision; no fluctuance; area demarcated     Impression/Plan:    30 y.o. is presenting today for post op cellulitis      Problem List Items Addressed This Visit        Nervous    Pain at surgical incision - Primary       Infectious/Inflammatory    Cellulitis of abdominal wall     Patient with erythema and induration superior to her incision along the length of her incision. Skin warm. No drainage or fluctuance. Incision itself intact and well healed. Will send course of antibiotics. Patient given instructions to call with worsening symptoms, fever/chills, or worsening pain. Patient has a follow up appointment with Dr. Dominguez next week.                Khalida Magaña DO

## 2021-11-05 NOTE — ASSESSMENT & PLAN NOTE
Patient with erythema and induration superior to her incision along the length of her incision. Skin warm. No drainage or fluctuance. Incision itself intact and well healed. Will send course of antibiotics. Keflex prescribed as patient is still breast feeding. Patient given instructions to call with worsening symptoms, fever/chills, or worsening pain. Patient has a follow up appointment with Dr. Dominguez next week.

## 2021-11-11 ENCOUNTER — OFFICE VISIT (OUTPATIENT)
Dept: OBSTETRICS AND GYNECOLOGY | Facility: CLINIC | Age: 31
End: 2021-11-11
Payer: COMMERCIAL

## 2021-11-11 VITALS — SYSTOLIC BLOOD PRESSURE: 98 MMHG | DIASTOLIC BLOOD PRESSURE: 60 MMHG | WEIGHT: 103.4 LBS | BODY MASS INDEX: 17.21 KG/M2

## 2021-11-11 DIAGNOSIS — N91.2 AMENORRHEA: Primary | ICD-10-CM

## 2021-11-11 PROCEDURE — 0503F POSTPARTUM CARE VISIT: CPT | Performed by: OBSTETRICS & GYNECOLOGY

## 2021-11-11 RX ORDER — SULFAMETHOXAZOLE AND TRIMETHOPRIM 800; 160 MG/1; MG/1
TABLET ORAL
COMMUNITY
Start: 2021-11-05 | End: 2023-03-22 | Stop reason: ALTCHOICE

## 2021-11-11 NOTE — PROGRESS NOTES
Sayda Abad is a  30 y.o.  for post partum visit after RPT LT csxn  And scar revision [place] GIRL  Complicated by PP incisional cellulitis after resuming exercise  Likely inflammatory and not infectious as pt is 6 weeks post op  Baby Girl with ante partum PVC    Pt started on prophylactic abx  Now resolved  No FCNV  No GI or  sx  Ambulating and octavia diet and voiding with no diff  Mild lochia now resolved    Pt seen 2021 (6 weeks post op) for incisional tenderness/erythema  Pt is breastfeeding/bottlefeeding        OB History:   OB History    Para Term  AB Living   3 3 3 0 0 3   SAB IAB Ectopic Multiple Live Births   0 0 0 0 3      # Outcome Date GA Lbr Ron/2nd Weight Sex Delivery Anes PTL Lv   3 Term 21 39w3d  3090 g (6 lb 13 oz) F CS-LTranv Spinal N EAN      Name: URBANO ABAD      Apgar1: 9  Apgar5: 9   2 Term 10/19/18 39w2d  3060 g (6 lb 11.9 oz) F CS-LVertical Spinal N EAN      Name: URBANO ABAD      Apgar1: 8  Apgar5: 8   1 Term 16 40w0d  3289 g (7 lb 4 oz) F CS-LTranv   EAN      Complications: Other (comment), Breech birth     Patient Active Problem List   Diagnosis   • Previous  delivery affecting pregnancy   • Fetal arrhythmia affecting pregnancy, antepartum   • Pain at surgical incision   • Cellulitis of abdominal wall       Medical History/GYN History:   Past Medical History:   Diagnosis Date   • Abnormal Pap smear of cervix        Surgical History:   Past Surgical History:   Procedure Laterality Date   •  SECTION      ,    • COLPOSCOPY     • WISDOM TOOTH EXTRACTION         Social History:   Social History     Socioeconomic History   • Marital status:      Spouse name: Kulwant   • Number of children: Not on file   • Years of education: Not on file   • Highest education level: Not on file   Occupational History   • Not on file   Tobacco Use   • Smoking status: Never Smoker   • Smokeless tobacco: Never Used   Vaping Use   • Vaping Use:  Never used   Substance and Sexual Activity   • Alcohol use: No   • Drug use: No   • Sexual activity: Yes     Partners: Male     Birth control/protection: None   Other Topics Concern   • Not on file   Social History Narrative   • Not on file     Social Determinants of Health     Financial Resource Strain:    • Difficulty of Paying Living Expenses: Not on file   Food Insecurity: No Food Insecurity   • Worried About Running Out of Food in the Last Year: Never true   • Ran Out of Food in the Last Year: Never true   Transportation Needs:    • Lack of Transportation (Medical): Not on file   • Lack of Transportation (Non-Medical): Not on file   Physical Activity:    • Days of Exercise per Week: Not on file   • Minutes of Exercise per Session: Not on file   Stress:    • Feeling of Stress : Not on file   Social Connections:    • Frequency of Communication with Friends and Family: Not on file   • Frequency of Social Gatherings with Friends and Family: Not on file   • Attends Yarsani Services: Not on file   • Active Member of Clubs or Organizations: Not on file   • Attends Club or Organization Meetings: Not on file   • Marital Status: Not on file   Intimate Partner Violence:    • Fear of Current or Ex-Partner: Not on file   • Emotionally Abused: Not on file   • Physically Abused: Not on file   • Sexually Abused: Not on file   Housing Stability:    • Unable to Pay for Housing in the Last Year: Not on file   • Number of Places Lived in the Last Year: Not on file   • Unstable Housing in the Last Year: Not on file        Family History:   Family History   Problem Relation Age of Onset   • No Known Problems Paternal Grandfather    • No Known Problems Paternal Grandmother    • No Known Problems Maternal Grandmother    • No Known Problems Maternal Grandfather    • No Known Problems Biological Father    • No Known Problems Biological Mother    • No Known Problems Biological Brother    • No Known Problems Biological Sister         Allergies: Patient has no known allergies.    Prior to Admission medications    Medication Sig Start Date End Date Taking? Authorizing Provider   cephalexin 250 mg capsule Take 1 capsule (250 mg total) by mouth 4 (four) times a day for 7 days. 21  Khalida Magaña DO   ibuprofen (MOTRIN) 600 mg tablet Take 1 tablet (600 mg total) by mouth every 6 (six) hours for 10 days. 9/29/21 10/9/21  Anish Dominguez MD   prenatal vits96/iron fum/folic (PRE-KOBI VITAMIN) 27 mg iron- 800 mcg tablet Take 1 tablet by mouth daily.    Provider, Gloria, MD       Review of Systems  Fatigue neg  Weight change neg  Cough neg  Chest pain/SOB neg  Abd pain neg  Breast problems neg  Abnormal bleeding neg  Vaginal d/c neg  Pelvic Pain neg  All other sx neg    Objective     Physical Exam  Well Nourished and Well Hydrated  AAO x3  JVD no  Cervical Lymphadenopathy no  Thyroid: No masses/nodules  Cardiac: RRR with no M/R/G  Lungs: CTA b/l with no wheezes,rales, rhonchi  Breast: no masses or nodes palpated b/l  Abd: SNT with No HSM/G/R/CVA tend/SPT: incision cdi    Pelvic: Normal urethral meatus  Bladder Normal  Vulva: no masses/lesions/discharge  Vagina: no masses/lesions/discharge  Cervix:No CMT/lesions/discharge  Uterus: normal with no enlargement/ no fibroids palpated  Left adnexa: no masses or tenderness  Right adnexa: no masses or tenderness    A/P Post Partum    1) Post Partum  Self Breast exam taught  Exercise and Nutrition Counseling Provided  Contraceptive Counseling Discussed: considering Mirena IUD and info provided  F/u for annual and pap or PRN if problems or questions/Mirena IUD placement

## 2021-12-17 ENCOUNTER — TELEPHONE (OUTPATIENT)
Dept: OBSTETRICS AND GYNECOLOGY | Facility: CLINIC | Age: 31
End: 2021-12-17
Payer: COMMERCIAL

## 2021-12-17 RX ORDER — NORETHINDRONE ACETATE AND ETHINYL ESTRADIOL 1.5-30(21)
1 KIT ORAL DAILY
Qty: 84 TABLET | Refills: 3 | Status: SHIPPED | OUTPATIENT
Start: 2021-12-17 | End: 2022-01-13 | Stop reason: SDUPTHER

## 2021-12-17 NOTE — TELEPHONE ENCOUNTER
Patient called and she was originally going to have an IUD placed but she changed her mind and would like you to order a birth control pill for her. She has taken Junel FE 1.5 in the past. Do you want me to order this? Please advise.

## 2022-01-04 ENCOUNTER — TELEPHONE (OUTPATIENT)
Dept: OBSTETRICS AND GYNECOLOGY | Facility: CLINIC | Age: 32
End: 2022-01-04
Payer: COMMERCIAL

## 2022-01-04 NOTE — TELEPHONE ENCOUNTER
Patient called and she has Covid and she wants to know if taking the birth control pill while having Covid increases her risk of developing a blood clot. Please advise.

## 2022-01-13 ENCOUNTER — TELEPHONE (OUTPATIENT)
Dept: OBSTETRICS AND GYNECOLOGY | Facility: CLINIC | Age: 32
End: 2022-01-13
Payer: COMMERCIAL

## 2022-01-13 RX ORDER — NORETHINDRONE ACETATE AND ETHINYL ESTRADIOL 1.5-30(21)
1 KIT ORAL DAILY
Qty: 28 TABLET | Refills: 0 | Status: SHIPPED | OUTPATIENT
Start: 2022-01-13 | End: 2022-02-15

## 2022-01-13 NOTE — TELEPHONE ENCOUNTER
Patient called and she is in [place] and forgot her Junel  FE 1.5/30birth control pills. I sent one refill down to [place] for her.

## 2022-01-19 NOTE — PROGRESS NOTES
Post-Op Check    Events  Pt seen/examined. No events since surgery.    Subjective  Pt denies: HA, CP, SOB, N/V and F/C.No complaints.  Pain: controlled w tylenol/toradol  Vaginal Bleeding: minimal  Voiding: Shelton catheter in place draining clear urine   Diet: taking clears  Bowel: no flatus    Vitals  Temp:  [36.3 °C (97.3 °F)-36.7 °C (98.1 °F)] 36.7 °C (98.1 °F)  Heart Rate:  [48-78] 54  Resp:  [16-20] 16  BP: (65-97)/(39-55) 90/52    I&O    Intake/Output Summary (Last 24 hours) at 2021 1627  Last data filed at 2021 1405  Gross per 24 hour   Intake 1250 ml   Output 1050 ml   Net 200 ml       Urine output since delivery: 200cc/3hrs     Physical Exam  General: A&Ox3 and NAD   Heart: Regular rate and rhythm  Lungs: Clear to auscultation bilaterally  Abdomen: soft, nondistended, non-tender, no rebound/rigidity/guarding. Fundus firm below U  Incision: dressing clean, dry, intact  Extremities: symmetric and no edema. +SCDs       Assessment/Plan   Problem-based Assessment and Plan    Sayda Thomason is a 30 y.o. POD#0 s/p RCS    1. Vital Signs: stable  2. Hemodynamics: Pre-op hgb 15.4, CBC ordered for the AM. No signs or symptoms of acute anemia.   3. Pain: controlled. Continue ERAS protocol.  4. VTE Assessment: SCDs. Encouraged ambulation at least 4 times daily.  5. Post-op care: UOP adequate. For shelton removal in AM.     Lorene Packer MD             - - -

## 2022-03-14 RX ORDER — NORETHINDRONE ACETATE AND ETHINYL ESTRADIOL 1.5-30(21)
1 KIT ORAL DAILY
Qty: 84 TABLET | Refills: 3 | Status: SHIPPED | OUTPATIENT
Start: 2022-03-14 | End: 2023-01-27 | Stop reason: SDUPTHER

## 2022-10-12 ENCOUNTER — APPOINTMENT (OUTPATIENT)
Dept: URBAN - METROPOLITAN AREA CLINIC 203 | Age: 32
Setting detail: DERMATOLOGY
End: 2022-10-12

## 2022-10-12 DIAGNOSIS — Z11.52 ENCOUNTER FOR SCREENING FOR COVID-19: ICD-10-CM

## 2022-10-12 DIAGNOSIS — L57.8 OTHER SKIN CHANGES DUE TO CHRONIC EXPOSURE TO NONIONIZING RADIATION: ICD-10-CM

## 2022-10-12 PROCEDURE — OTHER SUNSCREEN RECOMMENDATIONS: OTHER

## 2022-10-12 PROCEDURE — 99213 OFFICE O/P EST LOW 20 MIN: CPT

## 2022-10-12 PROCEDURE — OTHER SCREENING FOR COVID-19: OTHER

## 2022-10-12 PROCEDURE — OTHER COUNSELING: OTHER

## 2022-10-12 PROCEDURE — OTHER MIPS QUALITY: OTHER

## 2022-10-12 ASSESSMENT — LOCATION SIMPLE DESCRIPTION DERM
LOCATION SIMPLE: ABDOMEN
LOCATION SIMPLE: LEFT BREAST

## 2022-10-12 ASSESSMENT — LOCATION DETAILED DESCRIPTION DERM
LOCATION DETAILED: LEFT MEDIAL BREAST 11-12:00 REGION
LOCATION DETAILED: EPIGASTRIC SKIN
LOCATION DETAILED: LEFT MEDIAL BREAST 10-11:00 REGION

## 2022-10-12 ASSESSMENT — LOCATION ZONE DERM: LOCATION ZONE: TRUNK

## 2022-11-09 ENCOUNTER — OFFICE VISIT (OUTPATIENT)
Dept: OBSTETRICS AND GYNECOLOGY | Facility: CLINIC | Age: 32
End: 2022-11-09
Payer: COMMERCIAL

## 2022-11-09 VITALS — BODY MASS INDEX: 19.14 KG/M2 | WEIGHT: 115 LBS | DIASTOLIC BLOOD PRESSURE: 60 MMHG | SYSTOLIC BLOOD PRESSURE: 110 MMHG

## 2022-11-09 DIAGNOSIS — Z01.419 ENCOUNTER FOR ANNUAL ROUTINE GYNECOLOGICAL EXAMINATION: ICD-10-CM

## 2022-11-09 DIAGNOSIS — Z11.51 SCREENING FOR HUMAN PAPILLOMAVIRUS (HPV): Primary | ICD-10-CM

## 2022-11-09 PROCEDURE — 99395 PREV VISIT EST AGE 18-39: CPT | Performed by: OBSTETRICS & GYNECOLOGY

## 2022-11-09 PROCEDURE — 3008F BODY MASS INDEX DOCD: CPT | Performed by: OBSTETRICS & GYNECOLOGY

## 2022-11-09 PROCEDURE — 87624 HPV HI-RISK TYP POOLED RSLT: CPT | Performed by: OBSTETRICS & GYNECOLOGY

## 2022-11-09 PROCEDURE — G0145 SCR C/V CYTO,THINLAYER,RESCR: HCPCS | Performed by: OBSTETRICS & GYNECOLOGY

## 2022-11-09 NOTE — PROGRESS NOTES
Sayda Abad is a  31 y.o.  for annual exam.  She has regular monthly menses  with No LMP recorded (lmp unknown).      She uses OCP for birth control  She performs self breast exams with no concerns   Pt has saline implants BL and  Reports they have been in place  approx 10 yrs    Review of Systems  Constitutional: Negative  ENMT: Negative  Eyes: Negative  Respiratory: Negative  Cardio: Negative  GI:Negative  :Negative  Neuro: Negative  Psych: Negative  Integumentary: Negative  MS:Negative  Heme/Lymph: Negative  Reproductive: Negative    OB History:   OB History    Para Term  AB Living   3 3 3 0 0 3   SAB IAB Ectopic Multiple Live Births   0 0 0 0 3      # Outcome Date GA Lbr Ron/2nd Weight Sex Delivery Anes PTL Lv   3 Term 21 39w3d  3090 g (6 lb 13 oz) F CS-LTranv Spinal N EAN      Name: URBANO ABAD      Apgar1: 9  Apgar5: 9   2 Term 10/19/18 39w2d  3060 g (6 lb 11.9 oz) F CS-LVertical Spinal N EAN      Name: URBANO ABAD      Apgar1: 8  Apgar5: 8   1 Term 16 40w0d  3289 g (7 lb 4 oz) F CS-LTranv   EAN      Complications: Other (comment), Breech birth       Medical History/GYN History   Past Medical History:   Diagnosis Date    Abnormal Pap smear of cervix        Surgical History:   Past Surgical History:   Procedure Laterality Date     SECTION      ,     COLPOSCOPY      WISDOM TOOTH EXTRACTION         Social History:   Social History     Socioeconomic History    Marital status:      Spouse name: Kulwant   Tobacco Use    Smoking status: Never    Smokeless tobacco: Never   Vaping Use    Vaping Use: Never used   Substance and Sexual Activity    Alcohol use: No    Drug use: No    Sexual activity: Yes     Partners: Male     Birth control/protection: None        Family History:   Family History   Problem Relation Age of Onset    No Known Problems Paternal Grandfather     No Known Problems Paternal Grandmother     No Known Problems Maternal  Grandmother     No Known Problems Maternal Grandfather     No Known Problems Biological Father     No Known Problems Biological Mother     No Known Problems Biological Brother     No Known Problems Biological Sister        Allergies: Patient has no known allergies.    Prior to Admission medications    Medication Sig Start Date End Date Taking? Authorizing Provider   ibuprofen (MOTRIN) 600 mg tablet Take 1 tablet (600 mg total) by mouth every 6 (six) hours for 10 days. 9/29/21 10/9/21  Anish Dominguez MD   norethindrone-e.estradioL-iron (MICROGESTIN FE 1., ,) 1.5 mg-30 mcg (21)/75 mg (7) per tablet Take 1 tablet by mouth daily. 3/14/22 6/12/22  Anish Dominguez MD   prenatal vits96/iron fum/folic (PRE- VITAMIN) 27 mg iron- 800 mcg tablet Take 1 tablet by mouth daily.    Provider, MD Gloria   sulfamethoxazole-trimethoprim 800-160 mg per tablet  21   Provider, MD Gloria           Objective     Physical Exam   Constitutional: She is oriented to person, place, and time. She appears well-developed and well-nourished.   HEENT:   Head: Normocephalic and atraumatic.   Eyes: EOM are normal. Pupils are equal, round, and reactive to light.   Neck: Normal range of motion. No tracheal deviation present. No thyromegaly present.   Abdominal: Soft. Bowel sounds are normal. She exhibits no distension and no masses. There is no tenderness. There is no rebound and no guarding.   Musculoskeletal: Normal range of motion. She exhibits no edema.  Neurological: She is alert and oriented to person, place, and time. No cranial nerve deficit.   Skin: Skin is warm and dry.   Psychiatric: She has a normal mood and affect.     BREAST: no masses or nodes palpated b/l  BL implants    PELVIC:  Genitourinary: Vagina normal.   External female genitalia normal.   Normal bladder.   Vagina normal. No vaginal discharge found.   Cervix exam normal.Cervix does not exhibit motion tenderness or discharge. Uterus is normal size  . Uterine contour is regular.   Adnexa normal. Right adnexum no tenderness to palpation and no masses palpated, . Left adnexum no tenderness to palpation and no masses palpable  Nursing note and vitals reviewed.    A/P:  Annual: Pap  Self breast exam taught  Screening mammo annually at 40  Contraceptive counseling: OCP  Screening Dexa at 50  Screening Colonoscopy at 45  Cholesterol and TFT screening with Primary MD  The following counseling was provided: Diet and Exercise: Smoking Cessation; Drug/Alcohol Abuse/ HIV and Safe Sex/ Domestic Violence/ Vitamin Supplementation    2) Saline implants  Normal  Exam today  No evidence leakage  Pt reports they  Have been in place approx 10 years: risks reviewed  Referred to [facility] for  Evaluation and  Recommendation on replacement/removal

## 2022-11-25 LAB
CASE RPRT: NORMAL
CLINICAL INFO: NORMAL
CLINICAL INFO: NORMAL
HPV E6+E7 MRNA CVX QL NAA+PROBE: NEGATIVE
LMP START DATE: NORMAL
SPECIMEN PROCESSING COMMENT: NORMAL
THIN PREP CVX: NORMAL

## 2022-11-28 ENCOUNTER — TELEPHONE (OUTPATIENT)
Dept: OBSTETRICS AND GYNECOLOGY | Facility: CLINIC | Age: 32
End: 2022-11-28
Payer: COMMERCIAL

## 2022-11-28 NOTE — TELEPHONE ENCOUNTER
----- Message from Anish Dominguez MD sent at 11/27/2022  9:57 AM EST -----  Please call pt:   ASCUS but HPV  NEG  No evidence of precancerous cells or dysplasia  Pt should f/u pap and cotesting in 1 year

## 2022-11-28 NOTE — TELEPHONE ENCOUNTER
I called the patient with the results of the pap ASCUS/HPV negative and the recommendation to follow up with repeat Pap/co-testing in 1 year. Patient asked if you could please gladys her.

## 2023-01-27 ENCOUNTER — TELEPHONE (OUTPATIENT)
Dept: OBSTETRICS AND GYNECOLOGY | Facility: CLINIC | Age: 33
End: 2023-01-27
Payer: COMMERCIAL

## 2023-01-27 RX ORDER — NORETHINDRONE ACETATE AND ETHINYL ESTRADIOL 1.5-30(21)
1 KIT ORAL DAILY
Qty: 84 TABLET | Refills: 3 | Status: SHIPPED | OUTPATIENT
Start: 2023-01-27 | End: 2023-07-31 | Stop reason: ALTCHOICE

## 2023-01-27 RX ORDER — NORETHINDRONE ACETATE AND ETHINYL ESTRADIOL 1.5-30(21)
KIT ORAL
Qty: 84 TABLET | Refills: 3 | OUTPATIENT
Start: 2023-01-27

## 2023-03-22 ENCOUNTER — OFFICE VISIT (OUTPATIENT)
Dept: PRIMARY CARE | Facility: CLINIC | Age: 33
End: 2023-03-22
Payer: COMMERCIAL

## 2023-03-22 VITALS
HEIGHT: 65 IN | BODY MASS INDEX: 19.16 KG/M2 | HEART RATE: 68 BPM | OXYGEN SATURATION: 99 % | RESPIRATION RATE: 17 BRPM | DIASTOLIC BLOOD PRESSURE: 81 MMHG | WEIGHT: 115 LBS | SYSTOLIC BLOOD PRESSURE: 122 MMHG

## 2023-03-22 DIAGNOSIS — R53.83 OTHER FATIGUE: ICD-10-CM

## 2023-03-22 DIAGNOSIS — Z76.89 ENCOUNTER TO ESTABLISH CARE: Primary | ICD-10-CM

## 2023-03-22 DIAGNOSIS — Z13.220 SCREENING FOR CHOLESTEROL LEVEL: ICD-10-CM

## 2023-03-22 DIAGNOSIS — Z13.1 SCREENING FOR DIABETES MELLITUS: ICD-10-CM

## 2023-03-22 DIAGNOSIS — R05.2 SUBACUTE COUGH: ICD-10-CM

## 2023-03-22 PROCEDURE — 3008F BODY MASS INDEX DOCD: CPT | Performed by: INTERNAL MEDICINE

## 2023-03-22 PROCEDURE — 99203 OFFICE O/P NEW LOW 30 MIN: CPT | Performed by: INTERNAL MEDICINE

## 2023-03-22 RX ORDER — ALPRAZOLAM 0.25 MG/1
0.25 TABLET ORAL NIGHTLY PRN
Qty: 10 TABLET | Refills: 0 | Status: SHIPPED | OUTPATIENT
Start: 2023-03-22 | End: 2023-04-21

## 2023-03-22 RX ORDER — FLUTICASONE PROPIONATE 220 UG/1
1 AEROSOL, METERED RESPIRATORY (INHALATION)
Qty: 12 G | Refills: 0 | Status: SHIPPED | OUTPATIENT
Start: 2023-03-22 | End: 2023-04-03 | Stop reason: SDUPTHER

## 2023-03-22 ASSESSMENT — ENCOUNTER SYMPTOMS
COUGH: 0
FEVER: 0
SHORTNESS OF BREATH: 0
SORE THROAT: 0
FATIGUE: 1
SINUS PAIN: 0

## 2023-03-22 NOTE — ASSESSMENT & PLAN NOTE
Patient is here to establish care today.  Past medical history, family history, surgeries, social history, allergies, medications, vaccinations reviewed today.   -bloodwork ordered  -paps UTD  -declines bivalent

## 2023-03-22 NOTE — PROGRESS NOTES
Khalida Johns MD    [facility]  [address], [address]  [place] [address]  Phone: [phone]  Fax: [phone]     History of Present Illness     Subjective     Patient ID: Sayda Thomason is a 32 y.o. female.    Patient is here to establish care.    Medical History:    Surgical History:  Breast augmentation 10 years ago    FH:  Mother: healthy  Father: Prostate CA  M Grandmother:  M Grandfather: Pancreatic cancer  P Grandmother:  P Grandfather:  Siblings: brother healthy    Smoker? never  Alcohol? 7-10 drinks wine per week  Drugs? none    Health Maintenance:  Last Pap: 2022 ASCUS, neg HPV, FU with pap in 1 year recommendation  Last cholesterol and glucose:    COVID Vaccine? Moderna x 2  Tetanus Vaccine? 2016  Influenza? Did not get this past season      6, 4, 18 months girls  Stay at home mom    1 month of cough-started off dry, started getting coughing fits.  Went to [facility].  Had Zpack and Inhaler.  Did not take steroid.  Given albuterol inhaler. No Shortness of breath.  No fevers.  Still coughing at night.  Middle child is sick.  Had some wheezing but better.  Exercising-running.    Requests xanax for flights       Past Medical/Surgical/Family/Social History       The following have been reviewed and updated as appropriate in this visit:   Allergies  Meds  Problems         Past Medical History:   Diagnosis Date   • Abnormal Pap smear of cervix        Past Surgical History:   Procedure Laterality Date   •  SECTION      ,    • COLPOSCOPY     • WISDOM TOOTH EXTRACTION         Family History   Problem Relation Age of Onset   • No Known Problems Biological Mother    • Prostate cancer Biological Father    • No Known Problems Biological Sister    • No Known Problems Biological Brother    • No Known Problems Maternal Grandmother    • Pancreatic cancer Maternal Grandfather    • No Known Problems Paternal Grandmother    • No Known  "Problems Paternal Grandfather        Social History     Tobacco Use   • Smoking status: Never   • Smokeless tobacco: Never   Vaping Use   • Vaping status: Never Used     Passive vaping exposure: Yes   Substance Use Topics   • Alcohol use: Yes     Alcohol/week: 7.0 - 10.0 standard drinks of alcohol     Types: 7 - 10 Glasses of wine per week   • Drug use: No       Patient Care Team:  Khalida Johns MD as PCP - General (Internal Medicine)  Anish Dominguez MD as Obstetrician (Obstetrics and Gynecology)  Khalida Magaña DO as Obstetrician (Obstetrics and Gynecology)     Allergies and Medications       No Known Allergies    Current Outpatient Medications   Medication Sig Dispense Refill   • ALPRAZolam (XANAX) 0.25 mg tablet Take 1 tablet (0.25 mg total) by mouth nightly as needed for anxiety. 10 tablet 0   • fluticasone HFA (FLOVENT HFA) 220 mcg/actuation inhaler Inhale 1 puff 2 (two) times a day. 12 g 0   • norethindrone-e.estradioL-iron (MICROGESTIN FE 1.5/30, 28,) 1.5 mg-30 mcg (21)/75 mg (7) per tablet Take 1 tablet by mouth daily. 84 tablet 3     No current facility-administered medications for this visit.          Review of Systems       Review of Systems   Constitutional: Positive for fatigue. Negative for fever.   HENT: Negative for congestion, sinus pain and sore throat.    Respiratory: Negative for cough and shortness of breath.    Cardiovascular: Negative for chest pain.        Physical Examination       Objective     Vitals:    03/22/23 1640   BP: 122/81   Pulse: 68   Resp: 17   SpO2: 99%       Pulse Readings from Last 5 Encounters:   03/22/23 68   09/30/21 (!) 55   08/12/21 66   10/22/18 71   05/29/18 80       Wt Readings from Last 5 Encounters:   03/22/23 52.2 kg (115 lb)   11/09/22 52.2 kg (115 lb)   11/11/21 46.9 kg (103 lb 6.4 oz)   09/29/21 50.7 kg (111 lb 11.2 oz)   09/21/21 52.7 kg (116 lb 3.2 oz)       Ht Readings from Last 5 Encounters:   03/22/23 1.651 m (5' 5\")   09/27/21 1.651 m (5' 5\") " "  10/19/18 1.651 m (5' 5\")   08/30/18 1.651 m (5' 5\")   04/18/18 1.651 m (5' 5\")       BP Readings from Last 5 Encounters:   03/22/23 122/81   11/09/22 110/60   11/11/21 98/60   11/05/21 118/72   10/14/21 99/70       BMI Readings from Last 4 Encounters:   03/22/23 19.14 kg/m²   11/09/22 19.14 kg/m²   11/11/21 17.21 kg/m²   09/29/21 18.59 kg/m²       Physical Exam  Constitutional:       Appearance: Normal appearance.   HENT:      Nose: Nose normal.      Mouth/Throat:      Mouth: Mucous membranes are moist.   Eyes:      Conjunctiva/sclera: Conjunctivae normal.   Cardiovascular:      Rate and Rhythm: Normal rate and regular rhythm.      Heart sounds: Normal heart sounds. No murmur heard.  Pulmonary:      Effort: Pulmonary effort is normal.      Breath sounds: Normal breath sounds. No wheezing.   Neurological:      Mental Status: She is alert.   Psychiatric:         Mood and Affect: Mood normal.         Behavior: Behavior normal.         Thought Content: Thought content normal.          Laboratory Results     Lab Results   Component Value Date    WBC 5.63 09/28/2021    WBC 6.31 09/27/2021    WBC 5.57 09/08/2021    HGB 10.1 (L) 09/28/2021    HGB 15.4 09/27/2021    HGB 12.0 09/08/2021    HCT 29.8 (L) 09/28/2021    HCT 44.5 09/27/2021    HCT 33.9 (L) 09/08/2021    MCV 96.4 09/28/2021    MCV 94.5 09/27/2021    MCV 93.4 09/08/2021     (L) 09/28/2021     09/27/2021     (L) 09/08/2021        Lab Results   Component Value Date    GLUCOSE 106 (H) 05/29/2018    CALCIUM 8.9 05/29/2018     (L) 05/29/2018    K 3.6 05/29/2018    CO2 22 05/29/2018     05/29/2018    BUN 12 05/29/2018    CREATININE 0.6 05/29/2018    ALKPHOS 42 05/29/2018    AST 32 05/29/2018    ALT 21 05/29/2018    BILITOT 0.8 05/29/2018    ALBUMIN 3.4 05/29/2018       No results found for: CHOL  No results found for: HDL  No results found for: LDLCALC  No results found for: TRIG    The ASCVD Risk score (Simran DK, et al., 2019) failed " to calculate for the following reasons:    The 2019 ASCVD risk score is only valid for ages 40 to 79    No results found for: TSH  No results found for: FREET4      No results found for: HGBA1C    No results found for: CREATUR  No results found for: MICROALBUR  No results found for: ALBCRET    No results found for: HEPCAB    No results found for: MG         Immunization History   Administered Date(s) Administered   • Influenza Vaccine Quadrivalent Preservative Free 6 Mons and Up 10/21/2018   • Influenza, Unspecified 02/08/2018   • Tdap 07/21/2016         Health Maintenance Topics with due status: Overdue       Topic Date Due    Hepatitis B Vaccines Never done    COVID-19 Vaccine Never done    Depression Screening Never done    HIV Screening Never done    Hepatitis C Screening Never done    Influenza Vaccine 08/01/2022     Health Maintenance Topics with due status: Not Due       Topic Last Completion Date    DTaP, Tdap, and Td Vaccines 07/21/2016    Cervical Cancer Screening 11/09/2022    Zoster Vaccine Not Due     Health Maintenance Topics with due status: Aged Out       Topic Date Due    Meningococcal ACWY Aged Out    HIB Vaccines Aged Out    IPV Vaccines Aged Out    HPV Vaccines Aged Out    Pneumococcal Aged Out        Assessment and Plan       Assessment/Plan     Problem List Items Addressed This Visit        Respiratory    Subacute cough    Current Assessment & Plan     1 month of cough s/p zpack, normal xray and albuterol given to her by urgent care.  No shortness of breath, able to run (has to stop to cough periodically), no wheezing.  Normal cardiopulmonary exam.  - Flonase  - Flovent  - Call if no improvement after 2 weeks            Other    Encounter to establish care - Primary    Current Assessment & Plan     Patient is here to establish care today.  Past medical history, family history, surgeries, social history, allergies, medications, vaccinations reviewed today.   -bloodwork ordered  -paps  UTD  -declines bivalent         Other fatigue    Current Assessment & Plan     - Labs as ordered         Relevant Orders    CBC    TSH w reflex FT4   Other Visit Diagnoses     Screening for cholesterol level        Relevant Orders    Lipid panel    Screening for diabetes mellitus        Relevant Orders    Basic metabolic panel          No follow-ups on file.    Orders Placed This Encounter   Procedures   • Lipid panel     Standing Status:   Future     Standing Expiration Date:   3/22/2024     Order Specific Question:   Release to patient     Answer:   Immediate   • Basic metabolic panel     Standing Status:   Future     Standing Expiration Date:   3/22/2024     Order Specific Question:   Release to patient     Answer:   Immediate   • CBC     Standing Status:   Future     Standing Expiration Date:   3/22/2024     Order Specific Question:   Release to patient     Answer:   Immediate   • TSH w reflex FT4     Standing Status:   Future     Standing Expiration Date:   3/22/2024     Order Specific Question:   Release to patient     Answer:   Immediate              Khalida Johns MD    3/22/2023

## 2023-03-22 NOTE — ASSESSMENT & PLAN NOTE
1 month of cough s/p zpack, normal xray and albuterol given to her by urgent care.  No shortness of breath, able to run (has to stop to cough periodically), no wheezing.  Normal cardiopulmonary exam.  - Flonase  - Flovent  - Call if no improvement after 2 weeks

## 2023-03-22 NOTE — PATIENT INSTRUCTIONS
FLONASE:  Directly treats the problem without systemic side effects  Dose:  2 sprays per nostril daily  Technique matters:  use the opposite hand to spray each nostril to get the angle correct (use right hand to spray left nostril and left hand to spray right nostril).  Gently breathe in through nose after spraying to get most of the medicine in the nose and not the back of the throat.  May take days to weeks to be maximally effective    Rinse mouth out after using Flovent

## 2023-04-03 ENCOUNTER — TELEPHONE (OUTPATIENT)
Dept: PRIMARY CARE | Facility: CLINIC | Age: 33
End: 2023-04-03
Payer: COMMERCIAL

## 2023-04-03 RX ORDER — FLUTICASONE PROPIONATE 220 UG/1
1 AEROSOL, METERED RESPIRATORY (INHALATION)
Qty: 12 G | Refills: 0 | Status: SHIPPED | OUTPATIENT
Start: 2023-04-03 | End: 2023-04-04

## 2023-04-03 NOTE — TELEPHONE ENCOUNTER
Requesting flovent be sent to the pharmacy in [place].  The original inhaler was not covered by insurance.  Also, wants to make sure that flovent is an equivalent inhaler to the one prescribed last week.

## 2023-04-04 NOTE — TELEPHONE ENCOUNTER
I have sent in a different steroid inhaler.  There are no other inhalers that are exactly the same containing fluticasone but this would work the same.  thx!

## 2023-04-04 NOTE — TELEPHONE ENCOUNTER
Pt call back for an update on medication and is requesting it be send over today pt is completely out of medication

## 2023-04-04 NOTE — TELEPHONE ENCOUNTER
Informed patient the alternative inhaler was sent. Also advised that her throat will need evaluation and likely will need throat swab, so advised her [facility] [place]. Patient aware.

## 2023-04-04 NOTE — TELEPHONE ENCOUNTER
Patient called again today to report when she was here for her office visit on 03/22 you sent in Flovent HFA 220mcg inhaler which was not covered by her insurance.   She called asking for an alterative equivalent, but then it looks like the Flovent was sent in again.     She's asking for something other than the Flovent that is equivalent. Asking if this can be sent into the [facility] in [place], [place].     In addition - she reports having a sore throat for 2 days with visible white patches in the back of her throat. Should she have this evaluated at an urgent care in [place]? Please advise.

## 2023-06-22 ENCOUNTER — TELEPHONE (OUTPATIENT)
Dept: PRIMARY CARE | Facility: CLINIC | Age: 33
End: 2023-06-22

## 2023-06-22 NOTE — TELEPHONE ENCOUNTER
[facility] Appointment Request   Provider: Dr Johns  Appointment Type: SDS  Reason for Visit: R eye is swollen and droopy since this am, now having body aches & headache & pressure  Available Day and Time: Morning & early afternoons  Best Contact Number: [phone]    The practice will reach out to schedule your appointment within the next 2 business days.

## 2023-07-27 ENCOUNTER — TELEPHONE (OUTPATIENT)
Dept: OBSTETRICS AND GYNECOLOGY | Facility: CLINIC | Age: 33
End: 2023-07-27
Payer: COMMERCIAL

## 2023-07-27 NOTE — TELEPHONE ENCOUNTER
Spoke with patient  She has been taking OCP alternately 3months active pill but then in past 2 months has gone back to monthly  Dosing. Pt has not had menses. Pt instructed on HOME EPT but reports she doubts pregnancy. Pt was started on placebo in Jan 2023 by pharmacist but would like to go back to Junel 1.5/30 because she feels less bloating with that OCP. Reassurance provided that it is not unusual to have light or absent menses on OCP  Pt will restart Junel 1.5/30. Will keep menstrual diary and symptom diary and f/u Nov 2023 for annual to review

## 2023-07-27 NOTE — TELEPHONE ENCOUNTER
Patient has been taking Junel FE 1.5 for 1 1/2 years continuously. She took the placebo for the last two months and has not gotten a period and she is feeling very bloated.

## 2023-07-31 RX ORDER — NORETHINDRONE ACETATE AND ETHINYL ESTRADIOL 1.5; 3 MG/1; UG/1
1 TABLET ORAL DAILY
Qty: 84 EACH | Refills: 3 | Status: SHIPPED | OUTPATIENT
Start: 2023-07-31 | End: 2023-10-12

## 2023-08-16 ENCOUNTER — HOSPITAL ENCOUNTER (OUTPATIENT)
Facility: HOSPITAL | Age: 33
Setting detail: HOSPITAL OUTPATIENT SURGERY
End: 2023-08-16
Attending: PLASTIC SURGERY | Admitting: PLASTIC SURGERY
Payer: COMMERCIAL

## 2023-08-31 ENCOUNTER — OFFICE VISIT (OUTPATIENT)
Dept: PRIMARY CARE | Facility: CLINIC | Age: 33
End: 2023-08-31
Payer: COMMERCIAL

## 2023-08-31 ENCOUNTER — TELEPHONE (OUTPATIENT)
Dept: PRIMARY CARE | Facility: CLINIC | Age: 33
End: 2023-08-31

## 2023-08-31 VITALS
BODY MASS INDEX: 19.33 KG/M2 | HEIGHT: 65 IN | SYSTOLIC BLOOD PRESSURE: 124 MMHG | HEART RATE: 83 BPM | OXYGEN SATURATION: 98 % | WEIGHT: 116 LBS | DIASTOLIC BLOOD PRESSURE: 88 MMHG | RESPIRATION RATE: 16 BRPM

## 2023-08-31 DIAGNOSIS — M79.661 PAIN OF RIGHT CALF: ICD-10-CM

## 2023-08-31 DIAGNOSIS — R29.898 LEG HEAVINESS: Primary | Chronic | ICD-10-CM

## 2023-08-31 PROBLEM — R05.2 SUBACUTE COUGH: Status: RESOLVED | Noted: 2023-03-22 | Resolved: 2023-08-31

## 2023-08-31 PROBLEM — L03.311 CELLULITIS OF ABDOMINAL WALL: Status: RESOLVED | Noted: 2021-11-05 | Resolved: 2023-08-31

## 2023-08-31 PROCEDURE — 99214 OFFICE O/P EST MOD 30 MIN: CPT | Performed by: FAMILY MEDICINE

## 2023-08-31 PROCEDURE — 3008F BODY MASS INDEX DOCD: CPT | Performed by: FAMILY MEDICINE

## 2023-08-31 RX ORDER — ALPRAZOLAM 0.25 MG/1
TABLET ORAL
COMMUNITY
Start: 2023-03-22 | End: 2023-09-26 | Stop reason: SDUPTHER

## 2023-08-31 RX ORDER — MELOXICAM 15 MG/1
15 TABLET ORAL DAILY
Qty: 30 TABLET | Refills: 0 | Status: SHIPPED | OUTPATIENT
Start: 2023-08-31 | End: 2023-09-11 | Stop reason: ALTCHOICE

## 2023-09-01 ENCOUNTER — TELEPHONE (OUTPATIENT)
Dept: PRIMARY CARE | Facility: CLINIC | Age: 33
End: 2023-09-01
Payer: COMMERCIAL

## 2023-09-01 ENCOUNTER — HOSPITAL ENCOUNTER (OUTPATIENT)
Dept: RADIOLOGY | Facility: HOSPITAL | Age: 33
Discharge: HOME | End: 2023-09-01
Attending: FAMILY MEDICINE
Payer: COMMERCIAL

## 2023-09-01 DIAGNOSIS — R29.898 LEG HEAVINESS: Chronic | ICD-10-CM

## 2023-09-01 DIAGNOSIS — M79.661 PAIN OF RIGHT CALF: ICD-10-CM

## 2023-09-01 PROCEDURE — 93971 EXTREMITY STUDY: CPT | Mod: RT

## 2023-09-01 NOTE — TELEPHONE ENCOUNTER
----- Message from Andrea Potter DO sent at 9/1/2023 12:16 PM EDT -----  Inform Sayda her Us of her right leg showed no evidence for dvt

## 2023-09-11 ENCOUNTER — OFFICE VISIT (OUTPATIENT)
Dept: PRIMARY CARE | Facility: CLINIC | Age: 33
End: 2023-09-11
Payer: COMMERCIAL

## 2023-09-11 VITALS
SYSTOLIC BLOOD PRESSURE: 124 MMHG | HEART RATE: 80 BPM | BODY MASS INDEX: 19.66 KG/M2 | OXYGEN SATURATION: 99 % | WEIGHT: 118 LBS | DIASTOLIC BLOOD PRESSURE: 76 MMHG | RESPIRATION RATE: 16 BRPM | HEIGHT: 65 IN

## 2023-09-11 DIAGNOSIS — F41.9 ANXIETY: Chronic | ICD-10-CM

## 2023-09-11 DIAGNOSIS — R22.0 LUMP OF SCALP: Primary | ICD-10-CM

## 2023-09-11 DIAGNOSIS — R29.898 LEG HEAVINESS: ICD-10-CM

## 2023-09-11 DIAGNOSIS — R20.2 NUMBNESS AND TINGLING SENSATION OF SKIN: Chronic | ICD-10-CM

## 2023-09-11 DIAGNOSIS — R20.0 NUMBNESS AND TINGLING SENSATION OF SKIN: Chronic | ICD-10-CM

## 2023-09-11 PROCEDURE — 3008F BODY MASS INDEX DOCD: CPT | Performed by: FAMILY MEDICINE

## 2023-09-11 PROCEDURE — 99214 OFFICE O/P EST MOD 30 MIN: CPT | Performed by: FAMILY MEDICINE

## 2023-09-11 RX ORDER — BUSPIRONE HYDROCHLORIDE 10 MG/1
10 TABLET ORAL 2 TIMES DAILY
Qty: 60 TABLET | Refills: 1 | Status: SHIPPED | OUTPATIENT
Start: 2023-09-11 | End: 2023-10-12

## 2023-09-11 ASSESSMENT — ENCOUNTER SYMPTOMS
VOMITING: 0
BRUISES/BLEEDS EASILY: 0
POLYPHAGIA: 0
EYE REDNESS: 0
BLOOD IN STOOL: 0
NECK PAIN: 0
NAUSEA: 0
HEADACHES: 0
NUMBNESS: 0
WHEEZING: 0
ABDOMINAL DISTENTION: 0
ACTIVITY CHANGE: 0
WEAKNESS: 0
FEVER: 0
COUGH: 0
HEMATURIA: 0
WOUND: 0
NECK STIFFNESS: 0
MYALGIAS: 0
JOINT SWELLING: 0
DECREASED CONCENTRATION: 0
ADENOPATHY: 0
DYSURIA: 0
SHORTNESS OF BREATH: 0
BACK PAIN: 0
NERVOUS/ANXIOUS: 1
LIGHT-HEADEDNESS: 0
EYE PAIN: 0
ABDOMINAL PAIN: 0
CHILLS: 0
DIFFICULTY URINATING: 0
VOICE CHANGE: 0
FATIGUE: 0
CHEST TIGHTNESS: 0
PHOTOPHOBIA: 0
CONFUSION: 0
PALPITATIONS: 0
DYSPHORIC MOOD: 0
SINUS PRESSURE: 0
CONSTIPATION: 0
SORE THROAT: 0
SINUS PAIN: 0
APNEA: 0
DIZZINESS: 0
POLYDIPSIA: 0
FREQUENCY: 0
SEIZURES: 0
RHINORRHEA: 0
ARTHRALGIAS: 0
FLANK PAIN: 0

## 2023-09-11 NOTE — ASSESSMENT & PLAN NOTE
Having severe worry about her upcoming surgery and medical problems in the last 2 weeks  Was treated in the past as teenage with Lexapro and stopped as caused sedation  Using prn xanax for situational anxiety  Trial of Buspar 10mg one po bid   She will call and update how she is feeling in the next 1-2 weeks

## 2023-09-11 NOTE — ASSESSMENT & PLAN NOTE
Has improved since she felt initially last week  No lump palpated on exam today  Watchful waiting

## 2023-09-11 NOTE — ASSESSMENT & PLAN NOTE
Persistent right calf with  associated right dorsum foot intermittent numbness  Uncertain  Exam normal today   US neg for dvt  Remote hx of previous LBP previously  no sciatica   She will continue to rest   No relief with  Mobic 15 mg and will stop  Check EMG/NCS of rle r/o neuropathy   Dr Rivera neurology referral

## 2023-09-11 NOTE — PROGRESS NOTES
32 year old presents for sds visit for painless lump scalp  for the last few days   Was not painful  Though she had a pimple and has improved since she first felt lump  Today better  Continues to have right leg heaviness   has improved mildly  Top of right foot and right posterior calf heaviness  Had us negative for dvt  Trial of Mobic last week with limited improvement and stopped   No weakness  Has been resting and not exercising routinely   Started to have intermittent tingling in right hand and fingers and left fingers 1-2 days ago   Having hernia repair of her abdomen 10/23 and is very anxious about anesthesia and procedure  Having extreme anxiety and worry  coolness to her fingers tips as well    Was treated for her anxiety previously  with Lexapor and stopped as thought caused fatigue         The following have been reviewed and updated as appropriate in this visit:   Allergies  Meds  Problems         Past Medical History:   Diagnosis Date    Abnormal Pap smear of cervix        Past Surgical History:   Procedure Laterality Date     SECTION      , 2018    COLPOSCOPY      WISDOM TOOTH EXTRACTION  2013       Family History   Problem Relation Age of Onset    No Known Problems Biological Mother     Prostate cancer Biological Father     No Known Problems Biological Sister     No Known Problems Biological Brother     No Known Problems Maternal Grandmother     Pancreatic cancer Maternal Grandfather     No Known Problems Paternal Grandmother     No Known Problems Paternal Grandfather        Social History     Tobacco Use    Smoking status: Never    Smokeless tobacco: Never   Vaping Use    Vaping Use: Never used   Substance Use Topics    Alcohol use: Yes     Alcohol/week: 7.0 - 10.0 standard drinks of alcohol     Types: 7 - 10 Glasses of wine per week    Drug use: No       No Known Allergies    Current Outpatient Medications   Medication Sig Dispense Refill    ALPRAZolam (XANAX)  0.25 mg tablet TAKE ONE TABLET BY MOUTH EVERY NIGHT AT BEDTIME AS NEEDED FOR ANXIETY      beclomethasone dipropionate (QVAR RediHaler) 80 mcg/actuation inhaler Inhale 1 puff 2 (two) times a day. 10.6 g 1    busPIRone (BUSPAR) 10 mg tablet Take 1 tablet (10 mg total) by mouth 2 (two) times a day. 60 tablet 1    JUNEL 1.5/30, 21, 1.5-30 mg-mcg tablet Take 1 tablet by mouth daily. 84 each 3     No current facility-administered medications for this visit.       Review of Systems   Constitutional: Negative for activity change, chills, fatigue and fever.   HENT: Negative for congestion, ear discharge, ear pain, hearing loss, postnasal drip, rhinorrhea, sinus pressure, sinus pain, sneezing, sore throat, tinnitus and voice change.         Lump above left ear   Eyes: Negative for photophobia, pain, redness and visual disturbance.   Respiratory: Negative for apnea, cough, chest tightness, shortness of breath and wheezing.    Cardiovascular: Negative for chest pain, palpitations and leg swelling.   Gastrointestinal: Negative for abdominal distention, abdominal pain, blood in stool, constipation, nausea and vomiting.   Endocrine: Negative for cold intolerance, heat intolerance, polydipsia, polyphagia and polyuria.   Genitourinary: Negative for decreased urine volume, difficulty urinating, dyspareunia, dysuria, flank pain, frequency, hematuria, menstrual problem, pelvic pain, vaginal bleeding, vaginal discharge and vaginal pain.   Musculoskeletal: Negative for arthralgias, back pain, gait problem, joint swelling, myalgias, neck pain and neck stiffness.   Skin: Negative for pallor, rash and wound.   Allergic/Immunologic: Negative for environmental allergies and food allergies.   Neurological: Negative for dizziness, seizures, weakness, light-headedness, numbness and headaches.        Heaviness right dorsum of foot and posterior calf  Intermittent numbness tingling coolness to  fingers b/l    Hematological: Negative for  "adenopathy. Does not bruise/bleed easily.   Psychiatric/Behavioral: Negative for behavioral problems, confusion, decreased concentration and dysphoric mood. The patient is nervous/anxious.        Objective     Vitals:    09/11/23 1007   BP: 124/76   Pulse: 80   Resp: 16   SpO2: 99%     Vitals:    09/11/23 1007   BP: 124/76   BP Location: Right upper arm   Patient Position: Sitting   Pulse: 80   Resp: 16   SpO2: 99%   Weight: 53.5 kg (118 lb)   Height: 1.651 m (5' 5\")       Physical Exam  Vitals and nursing note reviewed.   Constitutional:       Appearance: Normal appearance.   HENT:      Head: Normocephalic and atraumatic.      Right Ear: Tympanic membrane, ear canal and external ear normal.      Left Ear: Tympanic membrane, ear canal and external ear normal.      Nose: Nose normal.      Mouth/Throat:      Mouth: Mucous membranes are moist.   Eyes:      Extraocular Movements: Extraocular movements intact.      Pupils: Pupils are equal, round, and reactive to light.   Cardiovascular:      Rate and Rhythm: Normal rate and regular rhythm.      Pulses: Normal pulses.      Heart sounds: Normal heart sounds.   Pulmonary:      Effort: Pulmonary effort is normal.      Breath sounds: Normal breath sounds.   Abdominal:      General: Abdomen is flat. Bowel sounds are normal.      Palpations: Abdomen is soft.   Musculoskeletal:      Cervical back: Normal.      Thoracic back: Normal.      Lumbar back: No spasms, tenderness or bony tenderness. Negative right straight leg raise test and negative left straight leg raise test.      Right hip: No tenderness, bony tenderness or crepitus. Normal range of motion. Normal strength.      Right lower leg: No swelling, tenderness or bony tenderness. No edema.      Right ankle: No lacerations. No tenderness. Normal range of motion. Anterior drawer test negative. Normal pulse.      Right foot: Normal range of motion. No tenderness, bony tenderness or crepitus.      Comments: Coolness tips of " fingers b/l   No discoloration of skin   Negative tinels and phalens b/l wrists  Full rom of b/l shoulders elbows     Skin:     Comments: No lump palpable left scalp above left ear   Neurological:      Mental Status: She is alert.   Psychiatric:         Mood and Affect: Mood is anxious.         Speech: Speech normal.         Behavior: Behavior normal. Behavior is cooperative.         Thought Content: Thought content normal.         Cognition and Memory: Cognition normal.         Judgment: Judgment normal.         Lab Results   Component Value Date    WBC 5.63 09/28/2021    HGB 10.1 (L) 09/28/2021    HCT 29.8 (L) 09/28/2021    MCV 96.4 09/28/2021     (L) 09/28/2021         Chemistry        Component Value Date/Time     (L) 05/29/2018 1453    K 3.6 05/29/2018 1453     05/29/2018 1453    CO2 22 05/29/2018 1453    BUN 12 05/29/2018 1453    CREATININE 0.6 05/29/2018 1453        Component Value Date/Time    CALCIUM 8.9 05/29/2018 1453    ALKPHOS 42 05/29/2018 1453    AST 32 05/29/2018 1453    ALT 21 05/29/2018 1453    BILITOT 0.8 05/29/2018 1453            No results found for: CHOL  No results found for: HDL  No results found for: LDLCALC  No results found for: TRIG  No results found for: CHOLHDL    No results found for: TSH    No results found for: HGBA1C    No results found for: HAV, HEPAIGM, HEPBIGM, HEPBCAB, HBEAG, HEPCAB    No results found for: MICROALBUR, [id number]    Assessment/Plan     1. Lump of scalp    2. Leg heaviness    3. Anxiety    4. Numbness and tingling sensation of skin        Problem List Items Addressed This Visit        Nervous    Numbness and tingling sensation of skin (Chronic)    Current Assessment & Plan     B/l fingers for the last 2 days   Coolness to fingers ? raynauds  Nl exam for peripheral neurpathy     Anxious about upcoming abdominal surgery 10/23  Avoid caffeine  Start back to exercise   Trial of Buspar 10 mg one po bid for her anxiety  She will call and update how  she is feeling in the next week  Further workup then if persists            Mental Health    Anxiety (Chronic)    Current Assessment & Plan     Having severe worry about her upcoming surgery and medical problems in the last 2 weeks  Was treated in the past as teenage with Lexapro and stopped as caused sedation  Using prn xanax for situational anxiety  Trial of Buspar 10mg one po bid   She will call and update how she is feeling in the next 1-2 weeks             Other    Leg heaviness (Chronic)    Current Assessment & Plan     Persistent right calf with  associated right dorsum foot intermittent numbness  Uncertain  Exam normal today   US neg for dvt  Remote hx of previous LBP previously  no sciatica   She will continue to rest   No relief with  Mobic 15 mg and will stop  Check EMG/NCS of rle r/o neuropathy   Dr Rivera neurology referral         Relevant Orders    Ambulatory referral to Neurology    EMG    Lump of scalp - Primary (Chronic)    Current Assessment & Plan     Has improved since she felt initially last week  No lump palpated on exam today  Watchful waiting             Return in about 1 month (around 10/11/2023) for Next scheduled follow-up.    Orders Placed This Encounter   Procedures    Ambulatory referral to Neurology     Standing Status:   Future     Standing Expiration Date:   3/11/2024     Referral Priority:   Routine     Referral Type:   Consultation     Referral Reason:   Specialty Services Required     Referred to Provider:   Zaire Rivera MD     Requested Specialty:   Neurology     Number of Visits Requested:   10    EMG     Standing Status:   Future     Standing Expiration Date:   9/11/2024     Order Specific Question:   Type:     Answer:   Lower extremity     Order Specific Question:   Type:     Answer:   Nerve conduction     Order Specific Question:   Laterality     Answer:   Left     Order Specific Question:   Reason for study     Answer:   Numbness     Order Specific Question:    Release to patient     Answer:   Immediate           Andrea Potter, DO  9/11/2023

## 2023-09-19 ENCOUNTER — TELEPHONE (OUTPATIENT)
Dept: OBSTETRICS AND GYNECOLOGY | Facility: CLINIC | Age: 33
End: 2023-09-19
Payer: COMMERCIAL

## 2023-09-19 DIAGNOSIS — R30.0 DYSURIA: Primary | ICD-10-CM

## 2023-09-19 NOTE — TELEPHONE ENCOUNTER
Patient called she is having dysuria and urinary frequency. Placed an order for a urine culture. Do you want to order antibiotics.

## 2023-09-20 ENCOUNTER — TELEPHONE (OUTPATIENT)
Dept: OBSTETRICS AND GYNECOLOGY | Facility: CLINIC | Age: 33
End: 2023-09-20
Payer: COMMERCIAL

## 2023-09-20 RX ORDER — NITROFURANTOIN 25; 75 MG/1; MG/1
100 CAPSULE ORAL 2 TIMES DAILY
Qty: 14 CAPSULE | Refills: 0 | Status: SHIPPED | OUTPATIENT
Start: 2023-09-20 | End: 2023-09-26

## 2023-09-21 LAB
CHOLEST SERPL-MCNC: 230 MG/DL (ref 100–199)
HDLC SERPL-MCNC: 101 MG/DL
LDLC SERPL CALC-MCNC: 121 MG/DL (ref 0–99)
SPECIMEN STATUS: NORMAL
T4 FREE SERPL-MCNC: 0.98 NG/DL (ref 0.82–1.77)
TRIGL SERPL-MCNC: 44 MG/DL (ref 0–149)
TSH SERPL DL<=0.005 MIU/L-ACNC: 9.33 UIU/ML (ref 0.45–4.5)
VLDLC SERPL CALC-MCNC: 8 MG/DL (ref 5–40)

## 2023-09-22 DIAGNOSIS — E03.9 HYPOTHYROIDISM, UNSPECIFIED TYPE: Primary | ICD-10-CM

## 2023-09-22 RX ORDER — LEVOTHYROXINE SODIUM 25 UG/1
25 TABLET ORAL
Qty: 90 TABLET | Refills: 0 | Status: SHIPPED | OUTPATIENT
Start: 2023-09-22 | End: 2023-09-22

## 2023-09-25 ENCOUNTER — TELEPHONE (OUTPATIENT)
Dept: PRIMARY CARE | Facility: CLINIC | Age: 33
End: 2023-09-25
Payer: COMMERCIAL

## 2023-09-25 NOTE — TELEPHONE ENCOUNTER
Spoke with patient and elt her know NP Iza would like her to come in for Pre-op.  Iza gave okay for patient to come in a 8:30 am this week for 30 min appt.

## 2023-09-25 NOTE — TELEPHONE ENCOUNTER
Patient informed to have her labs redrawn off biotin x approximately 5 days    Please reorder labs to [facility]    10/4 pre-op canceled due to patient scheduling conflict. Patient will go to  for pre-op.

## 2023-09-25 NOTE — TELEPHONE ENCOUNTER
Request for Medical Advice   Patient PCP: Andrea Potter, DO  New or Existing Issue: new    Question or Concern:   Pt stated one of the nurses gave her a call Friday about blood work. Was supposed to get an additional call back from nurse to schedule an appt this week. Please advise [phone]

## 2023-09-26 ENCOUNTER — CONSULT (OUTPATIENT)
Dept: PRIMARY CARE | Facility: CLINIC | Age: 33
End: 2023-09-26
Payer: COMMERCIAL

## 2023-09-26 VITALS
BODY MASS INDEX: 19.33 KG/M2 | DIASTOLIC BLOOD PRESSURE: 74 MMHG | SYSTOLIC BLOOD PRESSURE: 116 MMHG | OXYGEN SATURATION: 96 % | HEIGHT: 65 IN | WEIGHT: 116 LBS | HEART RATE: 77 BPM

## 2023-09-26 DIAGNOSIS — Z01.818 PREOPERATIVE CLEARANCE: Primary | ICD-10-CM

## 2023-09-26 DIAGNOSIS — R94.6 ABNORMAL THYROID FUNCTION TEST: ICD-10-CM

## 2023-09-26 DIAGNOSIS — F41.9 ANXIETY: Chronic | ICD-10-CM

## 2023-09-26 DIAGNOSIS — R20.2 PARESTHESIAS: ICD-10-CM

## 2023-09-26 DIAGNOSIS — Z00.00 ROUTINE ADULT HEALTH MAINTENANCE: ICD-10-CM

## 2023-09-26 PROCEDURE — 3008F BODY MASS INDEX DOCD: CPT | Performed by: NURSE PRACTITIONER

## 2023-09-26 PROCEDURE — 93000 ELECTROCARDIOGRAM COMPLETE: CPT | Performed by: NURSE PRACTITIONER

## 2023-09-26 PROCEDURE — 99215 OFFICE O/P EST HI 40 MIN: CPT | Performed by: NURSE PRACTITIONER

## 2023-09-26 RX ORDER — ALPRAZOLAM 0.25 MG/1
0.25 TABLET ORAL DAILY PRN
Qty: 10 TABLET | Refills: 0 | Status: SHIPPED | OUTPATIENT
Start: 2023-09-26 | End: 2023-10-12

## 2023-09-26 ASSESSMENT — ENCOUNTER SYMPTOMS
HEMATURIA: 0
BLOOD IN STOOL: 0
SHORTNESS OF BREATH: 0
FEVER: 0
VOMITING: 0
DIZZINESS: 0
PALPITATIONS: 0
DIARRHEA: 0
CHILLS: 0
NUMBNESS: 1
DIFFICULTY URINATING: 0
NAUSEA: 0
DYSURIA: 0

## 2023-09-26 NOTE — ASSESSMENT & PLAN NOTE
On recent labs  Lab Results   Component Value Date    TSH 9.330 (H) 09/20/2023   has been taking biotin prior  Will plan to hold biotin and repeat blood testing, will determine need for thyroid medication once results received

## 2023-09-26 NOTE — PROGRESS NOTES
MARIANA Goldsmith    [facility]  [address], [address]  [place] [address]  Phone: [phone]  Fax: [phone]     History of Present Illness         Patient ID: Sayda Thomason is a 32 y.o. female.    PCP: Khalida Johns MD      HPI    Patient presents for Preop clearance     Denies fever, chills  Denies chest pain, shortness of breath, palpitations    Denies unexplained weight loss      Procedure: Abdominoplasty   Surgery date:  10/18/2023  Surgeon: Dr Mikel Barroso    Patient denies personal history of anesthesia reaction, surgical complication  Denies family history of anesthesia/ surgical complications     Denies history of sleep apnea    Denies allergy to latex     Denies Smoking   Denies illicit drug/substances  ETOH  1-2 /day     Denies exertional symptoms of shortness of breath/ chest pain with climbing 2 flights of stairs, walking 4 blocks      Denies history of DVT, blood clot, stroke    Denies epistaxis, gum bleeding, hematuria, melena, or hematochezia.   Denies abnormal bleeding, bruising     Patient without history of renal, cardiac, pulmonary disease       Xanax for flying- requests refill today        Abnormal thyroid function test  On recent labs  Lab Results   Component Value Date    TSH 9.330 (H) 09/20/2023   has been taking biotin prior  Will plan to hold biotin and repeat blood testing, will determine need for thyroid medication once results received      Had seen Dr Potter 9/11/2023 for generalized symptoms   Leg heaviness, paresthesias  Notes some Photophobia, Mild intermittent headache   Follow up with neurology Dr Rivera Monday 10/2/2023      Past Medical/Surgical/Family/Social History         The following have been reviewed and updated as appropriate in this visit:   Tobacco  Allergies  Meds  Problems  Med Hx  Surg Hx  Fam Hx         Past Medical History:   Diagnosis Date    Abnormal Pap smear of cervix        Past Surgical  History:   Procedure Laterality Date     SECTION      ,     COLPOSCOPY      WISDOM TOOTH EXTRACTION  2013       Family History   Problem Relation Age of Onset    No Known Problems Biological Mother     Prostate cancer Biological Father     No Known Problems Biological Sister     No Known Problems Biological Brother     No Known Problems Maternal Grandmother     Pancreatic cancer Maternal Grandfather     No Known Problems Paternal Grandmother     No Known Problems Paternal Grandfather        Social History     Tobacco Use    Smoking status: Never    Smokeless tobacco: Never   Vaping Use    Vaping Use: Never used   Substance Use Topics    Alcohol use: Yes     Alcohol/week: 7.0 - 10.0 standard drinks of alcohol     Types: 7 - 10 Glasses of wine per week    Drug use: No       Patient Care Team:  Khalida Johns MD as PCP - General (Internal Medicine)  Anish Dominguez MD as Obstetrician (Obstetrics and Gynecology)  Khalida Magaña DO as Obstetrician (Obstetrics and Gynecology)    Allergies and Medications         No Known Allergies      Current Outpatient Medications   Medication Sig Dispense Refill    ALPRAZolam (XANAX) 0.25 mg tablet Take 1 tablet (0.25 mg total) by mouth daily as needed for anxiety (with flying). 10 tablet 0    beclomethasone dipropionate (QVAR RediHaler) 80 mcg/actuation inhaler Inhale 1 puff 2 (two) times a day. 10.6 g 1    busPIRone (BUSPAR) 10 mg tablet Take 1 tablet (10 mg total) by mouth 2 (two) times a day. 60 tablet 1    JUNEL 1.5/30, 21, 1.5-30 mg-mcg tablet Take 1 tablet by mouth daily. 84 each 3     No current facility-administered medications for this visit.       Review of Systems         Review of Systems   Constitutional: Negative for chills and fever.   Respiratory: Negative for shortness of breath.    Cardiovascular: Negative for chest pain and palpitations.   Gastrointestinal: Negative for blood in stool, diarrhea, nausea and vomiting.  "  Genitourinary: Negative for difficulty urinating, dysuria, hematuria and urgency.   Neurological: Positive for numbness. Negative for dizziness.         Physical Examination           Vitals:    09/26/23 0845   BP: 116/74   Pulse: 77   SpO2: 96%       Wt Readings from Last 3 Encounters:   09/26/23 52.6 kg (116 lb)   09/11/23 53.5 kg (118 lb)   08/31/23 52.6 kg (116 lb)       Ht Readings from Last 1 Encounters:   09/26/23 1.651 m (5' 5\")       BMI Readings from Last 3 Encounters:   09/26/23 19.30 kg/m²   09/11/23 19.64 kg/m²   08/31/23 19.30 kg/m²       Physical Exam  Vitals reviewed.   Constitutional:       General: She is not in acute distress.     Appearance: Normal appearance. She is not ill-appearing, toxic-appearing or diaphoretic.   HENT:      Head: Normocephalic.      Right Ear: Hearing, tympanic membrane and ear canal normal.      Left Ear: Hearing, tympanic membrane and ear canal normal.      Nose: Nose normal.      Mouth/Throat:      Pharynx: Oropharynx is clear. Uvula midline. No pharyngeal swelling, oropharyngeal exudate or posterior oropharyngeal erythema.   Eyes:      General: No scleral icterus.     Extraocular Movements: Extraocular movements intact.      Conjunctiva/sclera: Conjunctivae normal.      Pupils: Pupils are equal, round, and reactive to light.   Neck:      Thyroid: No thyroid mass, thyromegaly or thyroid tenderness.   Cardiovascular:      Rate and Rhythm: Normal rate and regular rhythm.      Pulses: Normal pulses.      Heart sounds: Normal heart sounds. No murmur heard.  Pulmonary:      Effort: Pulmonary effort is normal. No respiratory distress.      Breath sounds: Normal breath sounds.   Abdominal:      General: Bowel sounds are normal. There is no distension.      Palpations: Abdomen is soft. There is no mass.      Tenderness: There is no abdominal tenderness.   Musculoskeletal:         General: Normal range of motion.      Cervical back: Normal range of motion.      Right lower leg: " No edema.      Left lower leg: No edema.   Lymphadenopathy:      Cervical: No cervical adenopathy.   Skin:     General: Skin is warm.   Neurological:      General: No focal deficit present.      Mental Status: She is alert and oriented to person, place, and time.      Cranial Nerves: No cranial nerve deficit.      Sensory: No sensory deficit.      Motor: No weakness.      Coordination: Coordination normal.      Gait: Gait normal.   Psychiatric:         Mood and Affect: Mood normal.         Behavior: Behavior normal.         Thought Content: Thought content normal.         Judgment: Judgment normal.           Assessment and Plan         Assessment/Plan     Diagnoses and all orders for this visit:    Preoperative clearance (Primary)  Assessment & Plan:  Procedure: Abdominoplasty   Surgery date:  10/18/2023  Surgeon: Dr Mikel Barroso      Exam stable, vital signs normal  EKG obtained, reviewed- Normal  Labs ordered-will review cbc and bmp from surgeon and additional labs as ordered (pending)  Previous history of adverse reactions to anesthesia: None reported.  Risk for cardiopulmonary complication: Low risk. No recent or past MI noted in the PMH, no unstable or stable angina, no active or uncompensated heart failure, no present or past history of arrhythmias, v-fib, or SCD (personal or familial history). No history of KERA, COPD, asthma, PE, or other pulmonary conditions. Patient denies exertional chest pain/angina, AGUIRRE, or SOB. Able to tolerate activity and exercise.   Risk for DVT/PE: Low-/no risk for this procedure. (Use surgical protocol prophylaxis for DVT/PE as indicated in inpatient/surgical setting).  Risk for ETOH abuse/withdrawal complications: Low to no risk/none.  Difficult airway: No known complications with airway.   Medically cleared for proposed surgery pending lab results   Pre and postop instructions per surgeon        Orders:  -     ECG 12 LEAD OFFICE PERFORMED    Abnormal thyroid function  test  Assessment & Plan:  On recent labs  Lab Results   Component Value Date    TSH 9.330 (H) 09/20/2023   has been taking biotin prior  Will plan to hold biotin and repeat blood testing, will determine need for thyroid medication once results received      Paresthesias  Assessment & Plan:  Ongoing since 9/2023  Unchanged from previous  Scheduled with neurology Dr Rivera Monday 10/2/2023  Check labs as ordered  Follow up neuro    Orders:  -     Iron and TIBC; Future  -     Ferritin; Future  -     VITAMIN B12/FOLATE, SERUM PANEL; Future  -     Vitamin D 25 hydroxy; Future  -     Hemoglobin A1c; Future    Routine adult health maintenance  -     Ambulatory referral to Ophthalmology; Future    Anxiety  Assessment & Plan:  Requesting refill xanax for upcoming flight- rx sent   pdmp reviewed and appropriate    Orders:  -     ALPRAZolam (XANAX) 0.25 mg tablet; Take 1 tablet (0.25 mg total) by mouth daily as needed for anxiety (with flying).       No follow-ups on file.    Orders Placed This Encounter   Procedures    Iron and TIBC     Standing Status:   Future     Number of Occurrences:   1     Standing Expiration Date:   9/26/2024     Order Specific Question:   Release to patient     Answer:   Immediate [1]    Ferritin     Standing Status:   Future     Number of Occurrences:   1     Standing Expiration Date:   9/26/2024     Order Specific Question:   Release to patient     Answer:   Immediate [1]    VITAMIN B12/FOLATE, SERUM PANEL     Standing Status:   Future     Number of Occurrences:   1     Standing Expiration Date:   9/26/2024     Order Specific Question:   Release to patient     Answer:   Immediate [1]    Vitamin D 25 hydroxy     Standing Status:   Future     Number of Occurrences:   1     Standing Expiration Date:   9/26/2024     Order Specific Question:   Release to patient     Answer:   Immediate [1]    Hemoglobin A1c     Standing Status:   Future     Number of Occurrences:   1     Standing Expiration Date:    9/26/2024     Order Specific Question:   Release to patient     Answer:   Immediate [1]    Ambulatory referral to Ophthalmology     Standing Status:   Future     Standing Expiration Date:   3/26/2024     Referral Priority:   Routine     Referral Type:   Consultation     Referral Reason:   Specialty Services Required     Referred to Provider:   Brayden Martinez MD     Requested Specialty:   Ophthalmology     Number of Visits Requested:   10    ECG 12 LEAD OFFICE PERFORMED     Scheduling Instructions:      PLEASE USE THIS ORDER FOR ECG'S PERFORMED IN PHYSICIAN OFFICES     Order Specific Question:   Release to patient     Answer:   Immediate [1]       I spent a total of 45 minutes on this date of service performing the following activities:  Pre-visit medical record and diagnostic testing and medical consultant report review, obtaining history from patient, performing examination, entering orders, comprehensive medication reconciliation, counseling and education, and documentation of visit.        Iza Ferrell, RY Anaya    9/26/2023

## 2023-09-26 NOTE — ASSESSMENT & PLAN NOTE
Procedure: Abdominoplasty   Surgery date:  10/18/2023  Surgeon: Dr Mikel Barroso      Exam stable, vital signs normal  EKG obtained, reviewed- Normal  Labs ordered-will review cbc and bmp from surgeon and additional labs as ordered (pending)  Previous history of adverse reactions to anesthesia: None reported.  Risk for cardiopulmonary complication: Low risk. No recent or past MI noted in the PMH, no unstable or stable angina, no active or uncompensated heart failure, no present or past history of arrhythmias, v-fib, or SCD (personal or familial history). No history of KERA, COPD, asthma, PE, or other pulmonary conditions. Patient denies exertional chest pain/angina, AGUIRRE, or SOB. Able to tolerate activity and exercise.   Risk for DVT/PE: Low-/no risk for this procedure. (Use surgical protocol prophylaxis for DVT/PE as indicated in inpatient/surgical setting).  Risk for ETOH abuse/withdrawal complications: Low to no risk/none.  Difficult airway: No known complications with airway.   Medically cleared for proposed surgery pending lab results   Pre and postop instructions per surgeon

## 2023-09-26 NOTE — ASSESSMENT & PLAN NOTE
Ongoing since 9/2023  Unchanged from previous  Scheduled with neurology Dr Rivera Monday 10/2/2023  Check labs as ordered  Follow up neuro

## 2023-09-27 ENCOUNTER — TELEPHONE (OUTPATIENT)
Dept: PRIMARY CARE | Facility: CLINIC | Age: 33
End: 2023-09-27

## 2023-09-27 NOTE — TELEPHONE ENCOUNTER
Please fax [phone] In C/O  Liz   Pre Op clearance to [facility] for upcoming surgery schedule in early Oct exact date unknown.

## 2023-09-29 DIAGNOSIS — R94.6 ABNORMAL THYROID FUNCTION TEST: Primary | ICD-10-CM

## 2023-09-29 LAB
25(OH)D3+25(OH)D2 SERPL-MCNC: 64.6 NG/ML (ref 30–100)
BUN SERPL-MCNC: 15 MG/DL (ref 6–20)
BUN/CREAT SERPL: 20 (ref 9–23)
CALCIUM SERPL-MCNC: 9.6 MG/DL (ref 8.7–10.2)
CHLORIDE SERPL-SCNC: 98 MMOL/L (ref 96–106)
CO2 SERPL-SCNC: 21 MMOL/L (ref 20–29)
CREAT SERPL-MCNC: 0.74 MG/DL (ref 0.57–1)
EGFRCR SERPLBLD CKD-EPI 2021: 109 ML/MIN/1.73
FERRITIN SERPL-MCNC: 74 NG/ML (ref 15–150)
FOLATE SERPL-MCNC: 8.9 NG/ML
GLUCOSE SERPL-MCNC: 91 MG/DL (ref 70–99)
HBA1C MFR BLD: 5 % (ref 4.8–5.6)
IRON SATN MFR SERPL: 25 % (ref 15–55)
IRON SERPL-MCNC: 115 UG/DL (ref 27–159)
POTASSIUM SERPL-SCNC: 4.6 MMOL/L (ref 3.5–5.2)
SODIUM SERPL-SCNC: 135 MMOL/L (ref 134–144)
SPECIMEN STATUS: NORMAL
T4 FREE SERPL-MCNC: 1.01 NG/DL (ref 0.82–1.77)
THYROPEROXIDASE AB SERPL-ACNC: <9 IU/ML (ref 0–34)
TIBC SERPL-MCNC: 455 UG/DL (ref 250–450)
TSH SERPL DL<=0.005 MIU/L-ACNC: 5.14 UIU/ML (ref 0.45–4.5)
UIBC SERPL-MCNC: 340 UG/DL (ref 131–425)
VIT B12 SERPL-MCNC: 532 PG/ML (ref 232–1245)

## 2023-10-03 ENCOUNTER — TRANSCRIBE ORDERS (OUTPATIENT)
Dept: SCHEDULING | Age: 33
End: 2023-10-03

## 2023-10-03 DIAGNOSIS — H81.319 AURAL VERTIGO, UNSPECIFIED EAR: ICD-10-CM

## 2023-10-03 DIAGNOSIS — R51.0 HEADACHE WITH ORTHOSTATIC COMPONENT, NOT ELSEWHERE CLASSIFIED: Primary | ICD-10-CM

## 2023-10-03 DIAGNOSIS — M62.81 MUSCLE WEAKNESS (GENERALIZED): ICD-10-CM

## 2023-10-05 NOTE — TELEPHONE ENCOUNTER
----- Message from RY Gama sent at 10/4/2023  8:28 AM EDT -----  Please call patient with results as noted in message (it appears patient did not read [employer] message). Please also let her know that we have been trying to reach surgeon for the CBC they had drawn and we have not been able to get it so if she can try to assist that would be great    Thank you  Iza

## 2023-10-05 NOTE — TELEPHONE ENCOUNTER
Spoke with pt. Informed her that we have not received bloodwork from surgeons office. She stated she will contact their office.

## 2023-10-06 DIAGNOSIS — N30.00 ACUTE CYSTITIS WITHOUT HEMATURIA: ICD-10-CM

## 2023-10-06 PROBLEM — N39.0 UTI (URINARY TRACT INFECTION): Status: ACTIVE | Noted: 2023-10-06

## 2023-10-06 RX ORDER — SULFAMETHOXAZOLE AND TRIMETHOPRIM 800; 160 MG/1; MG/1
1 TABLET ORAL 2 TIMES DAILY
Qty: 14 TABLET | Refills: 0 | Status: SHIPPED | OUTPATIENT
Start: 2023-10-06 | End: 2023-10-06

## 2023-10-06 RX ORDER — SULFAMETHOXAZOLE AND TRIMETHOPRIM 800; 160 MG/1; MG/1
1 TABLET ORAL 2 TIMES DAILY
Qty: 28 TABLET | Refills: 0 | Status: SHIPPED | OUTPATIENT
Start: 2023-10-06 | End: 2023-10-12

## 2023-10-07 NOTE — ASSESSMENT & PLAN NOTE
I scripted Bactrim DS x 14 days, take all doses.  She is to call for fever or worsening back pain. Stop macrobid (restarted with returning sx) and start Bactrim. Possibility that pyelonephritis can still develop. If not improving after 24-48 hours, call back. Can do OTC uristat for comfort  Call office Monday to follow up if discharge does not resolve, for exam and cultures.

## 2023-10-09 ENCOUNTER — TELEPHONE (OUTPATIENT)
Dept: PRIMARY CARE | Facility: CLINIC | Age: 33
End: 2023-10-09

## 2023-10-09 NOTE — TELEPHONE ENCOUNTER
Request for Medical Advice (NON-URGENT)   Patient PCP: Khalida Johns MD  New or Existing Issue: New   Question or Concern: patient is calling because she says the pupils in her eyes has not been dilated. She says this has been going on for over a week, and she has been having some headaches.    Preferred Pharmacy:   [facility] - [place] - [address] [facility]  [address] [facility] [address]  Phone: [phone] Fax: [phone]    [id number] - [place], [place] - [address] [facility]  [address] [facility] [address]  Phone: [phone] Fax: [phone]    [facility]pharmacy #[id number] [facility] [address] [address]  Phone: [phone] Fax: [phone]      The practice will reach out to discuss your Medical Question or Concern within 2 business days.

## 2023-10-09 NOTE — TELEPHONE ENCOUNTER
Spoke to patient and relayed plan per RUMA Couch:    We will await to see results of Brain MRI scheduled for Friday, no PCP appt needed before then.     In the meantime - Patient was given the office information/phone number for [facility] to see if she can be seen this week for eye exam.     Instructed patient to call her neurology office to report these new symptoms to see if they have any further recommendations.     Patient also advised to follow up with us with any new, or worsening symptoms.

## 2023-10-16 ENCOUNTER — HOSPITAL ENCOUNTER (OUTPATIENT)
Dept: RADIOLOGY | Facility: CLINIC | Age: 33
Discharge: HOME | End: 2023-10-16
Attending: PSYCHIATRY & NEUROLOGY
Payer: COMMERCIAL

## 2023-10-16 DIAGNOSIS — M62.81 MUSCLE WEAKNESS (GENERALIZED): ICD-10-CM

## 2023-10-16 DIAGNOSIS — H81.319 AURAL VERTIGO, UNSPECIFIED EAR: ICD-10-CM

## 2023-10-16 DIAGNOSIS — R51.0 HEADACHE WITH ORTHOSTATIC COMPONENT, NOT ELSEWHERE CLASSIFIED: ICD-10-CM

## 2023-10-16 RX ORDER — GADOBUTROL 604.72 MG/ML
0.1 INJECTION INTRAVENOUS ONCE
Status: COMPLETED | OUTPATIENT
Start: 2023-10-16 | End: 2023-10-16

## 2023-10-16 RX ADMIN — GADOBUTROL 5.4 ML: 604.72 INJECTION INTRAVENOUS at 15:53

## 2023-11-12 ENCOUNTER — NURSE TRIAGE (OUTPATIENT)
Dept: PRIMARY CARE | Facility: CLINIC | Age: 33
End: 2023-11-12
Payer: COMMERCIAL

## 2023-11-12 RX ORDER — OFLOXACIN 3 MG/ML
2 SOLUTION/ DROPS OPHTHALMIC
Qty: 6.3 ML | Refills: 0 | Status: SHIPPED | OUTPATIENT
Start: 2023-11-12 | End: 2023-11-19

## 2023-11-12 NOTE — TELEPHONE ENCOUNTER
Patient called stating she has conjunctivitis in her right eye. Symptoms started this am with redness and crust. Denies any other symptoms. No vision problems. Sent ofloxacin.Reviewed conjunctivitis protocol with patient. Will follow up if needed. Pt agreed and verbalized understanding with plan of care.

## 2023-12-13 NOTE — PROGRESS NOTES
Sayda Abad is a  33 y.o.  for annual exam.  She has regular monthly scant menses  with LMP 23     She uses OCP for birth control    Pt has Increased TSH but asx off meds and followed by PCP    Pt has saline implants BL and  Reports they have been in place > 10 yrs  She performs self breast exams with no concerns     Review of Systems  Constitutional: Negative  ENMT: Negative  Eyes: Negative  Respiratory: Negative  Cardio: Negative  GI:Negative  :Negative  Neuro: Negative  Psych: Negative  Integumentary: Negative  MS:Negative  Heme/Lymph: Negative  Reproductive: Negative    OB History:   OB History    Para Term  AB Living   3 3 3 0 0 3   SAB IAB Ectopic Multiple Live Births   0 0 0 0 3      # Outcome Date GA Lbr Ron/2nd Weight Sex Delivery Anes PTL Lv   3 Term 21 39w3d  3090 g (6 lb 13 oz) F CS-LTranv Spinal N EAN      Name: URBANO ABAD      Apgar1: 9  Apgar5: 9   2 Term 10/19/18 39w2d  3060 g (6 lb 11.9 oz) F CS-LVertical Spinal N EAN      Name: JENNIFFERGIRL      Apgar1: 8  Apgar5: 8   1 Term 16 40w0d  3289 g (7 lb 4 oz) F CS-LTranv   EAN      Complications: Other (comment), Breech birth       Medical History/GYN History   Past Medical History:   Diagnosis Date   • Abnormal Pap smear of cervix        Surgical History:   Past Surgical History:   Procedure Laterality Date   •  SECTION      ,    • COLPOSCOPY     • WISDOM TOOTH EXTRACTION         Social History:   Social History     Socioeconomic History   • Marital status:      Spouse name: Kulwant   Tobacco Use   • Smoking status: Never   • Smokeless tobacco: Never   Vaping Use   • Vaping Use: Never used   Substance and Sexual Activity   • Alcohol use: Yes     Alcohol/week: 7.0 - 10.0 standard drinks of alcohol     Types: 7 - 10 Glasses of wine per week   • Drug use: No   • Sexual activity: Yes     Partners: Male     Birth control/protection: None     Social Determinants of Health     Food Insecurity:  No Food Insecurity (8/10/2021)    Hunger Vital Sign    • Worried About Running Out of Food in the Last Year: Never true    • Ran Out of Food in the Last Year: Never true        Family History:   Family History   Problem Relation Age of Onset   • No Known Problems Biological Mother    • Prostate cancer Biological Father    • No Known Problems Biological Sister    • No Known Problems Biological Brother    • No Known Problems Maternal Grandmother    • Pancreatic cancer Maternal Grandfather    • No Known Problems Paternal Grandmother    • No Known Problems Paternal Grandfather        Allergies: Patient has no known allergies.    Prior to Admission medications    Not on File           Objective     Physical Exam   Constitutional: She is oriented to person, place, and time. She appears well-developed and well-nourished.   HEENT:   Head: Normocephalic and atraumatic.   Eyes: EOM are normal. Pupils are equal, round, and reactive to light.   Neck: Normal range of motion. No tracheal deviation present. No thyromegaly present.   Abdominal: Soft. Bowel sounds are normal. She exhibits no distension and no masses. There is no tenderness. There is no rebound and no guarding.   Musculoskeletal: Normal range of motion. She exhibits no edema.  Neurological: She is alert and oriented to person, place, and time. No cranial nerve deficit.   Skin: Skin is warm and dry.   Psychiatric: She has a normal mood and affect.     BREAST: no masses or nodes palpated b/l  BL implants    PELVIC:  Genitourinary: Vagina normal.   External female genitalia normal.   Normal bladder.   Vagina normal. No vaginal discharge found.   Cervix exam normal.Cervix does not exhibit motion tenderness or discharge. Uterus is normal size . Uterine contour is regular.   Adnexa normal. Right adnexum no tenderness to palpation and no masses palpated, . Left adnexum no tenderness to palpation and no masses palpable  Nursing note and vitals reviewed.    A/P:  Annual: Pap  (last 11/2022 ASCUS HPV NEG)  Self breast exam taught  Screening mammo annually at 39: referred to Plastics for implant replacement  Contraceptive counseling: OCP  Screening Dexa at 49  Screening Colonoscopy at 44  Cholesterol and TFT screening with Primary MD  The following counseling was provided: Diet and Exercise: Smoking Cessation; Drug/Alcohol Abuse/ HIV and Safe Sex/ Domestic Violence/ Vitamin Supplementation    2) Hx Past abnormal pap  Most recent ASCUS/HPV neg  cx appears normal with no lesions  Pap and cotesting and pt to call to review  Pt had HPV Vacc in past

## 2023-12-14 ENCOUNTER — OFFICE VISIT (OUTPATIENT)
Dept: OBSTETRICS AND GYNECOLOGY | Facility: CLINIC | Age: 33
End: 2023-12-14
Payer: COMMERCIAL

## 2023-12-14 VITALS — SYSTOLIC BLOOD PRESSURE: 120 MMHG | DIASTOLIC BLOOD PRESSURE: 82 MMHG | BODY MASS INDEX: 19.3 KG/M2 | WEIGHT: 116 LBS

## 2023-12-14 DIAGNOSIS — Z87.42 HISTORY OF ABNORMAL CERVICAL PAP SMEAR: ICD-10-CM

## 2023-12-14 DIAGNOSIS — Z12.4 SCREENING FOR MALIGNANT NEOPLASM OF CERVIX: Primary | ICD-10-CM

## 2023-12-14 PROCEDURE — 99213 OFFICE O/P EST LOW 20 MIN: CPT | Mod: 25 | Performed by: OBSTETRICS & GYNECOLOGY

## 2023-12-14 PROCEDURE — 99395 PREV VISIT EST AGE 18-39: CPT | Performed by: OBSTETRICS & GYNECOLOGY

## 2023-12-14 PROCEDURE — 3008F BODY MASS INDEX DOCD: CPT | Performed by: OBSTETRICS & GYNECOLOGY

## 2023-12-24 LAB
CYTOLOGIST CVX/VAG CYTO: NORMAL
CYTOLOGY CVX/VAG DOC CYTO: NORMAL
CYTOLOGY CVX/VAG DOC THIN PREP: NORMAL
DX ICD CODE: NORMAL
HPV I/H RISK 4 DNA CVX QL PROBE+SIG AMP: NEGATIVE
LAB CORP NOTE:: NORMAL
LAB CORP QC REVIEWED BY:: NORMAL
LC HPV GENOTYPE REFLEX: NORMAL
OTHER STN SPEC: NORMAL
STAT OF ADQ CVX/VAG CYTO-IMP: NORMAL

## 2024-01-23 ENCOUNTER — OFFICE VISIT (OUTPATIENT)
Dept: PRIMARY CARE | Facility: CLINIC | Age: 34
End: 2024-01-23
Payer: COMMERCIAL

## 2024-01-23 VITALS
BODY MASS INDEX: 19.99 KG/M2 | WEIGHT: 120 LBS | RESPIRATION RATE: 16 BRPM | DIASTOLIC BLOOD PRESSURE: 78 MMHG | SYSTOLIC BLOOD PRESSURE: 120 MMHG | OXYGEN SATURATION: 99 % | HEIGHT: 65 IN | HEART RATE: 95 BPM

## 2024-01-23 DIAGNOSIS — R05.2 SUBACUTE COUGH: Primary | ICD-10-CM

## 2024-01-23 DIAGNOSIS — J06.9 VIRAL UPPER RESPIRATORY TRACT INFECTION: ICD-10-CM

## 2024-01-23 DIAGNOSIS — R07.89 ATYPICAL CHEST PAIN: ICD-10-CM

## 2024-01-23 LAB
FLUAV RNA SPEC QL NAA+PROBE: NEGATIVE
FLUBV RNA SPEC QL NAA+PROBE: NEGATIVE
RSV RNA SPEC QL NAA+PROBE: NEGATIVE
SARS-COV-2 RNA RESP QL NAA+PROBE: NEGATIVE

## 2024-01-23 PROCEDURE — 3008F BODY MASS INDEX DOCD: CPT | Performed by: FAMILY MEDICINE

## 2024-01-23 PROCEDURE — 99214 OFFICE O/P EST MOD 30 MIN: CPT | Performed by: FAMILY MEDICINE

## 2024-01-23 PROCEDURE — 93000 ELECTROCARDIOGRAM COMPLETE: CPT | Performed by: FAMILY MEDICINE

## 2024-01-23 PROCEDURE — 87637 SARSCOV2&INF A&B&RSV AMP PRB: CPT | Performed by: FAMILY MEDICINE

## 2024-01-23 RX ORDER — AMOXICILLIN 500 MG/1
500 TABLET, FILM COATED ORAL 3 TIMES DAILY
Qty: 21 TABLET | Refills: 0 | Status: SHIPPED | OUTPATIENT
Start: 2024-01-23 | End: 2024-01-30

## 2024-01-23 RX ORDER — METHYLPREDNISOLONE 4 MG/1
TABLET ORAL
Qty: 21 TABLET | Refills: 0 | Status: SHIPPED | OUTPATIENT
Start: 2024-01-23 | End: 2024-01-30

## 2024-01-23 RX ORDER — BENZONATATE 100 MG/1
100 CAPSULE ORAL 3 TIMES DAILY PRN
Qty: 30 CAPSULE | Refills: 1 | Status: SHIPPED | OUTPATIENT
Start: 2024-01-23 | End: 2024-02-12

## 2024-01-23 ASSESSMENT — ENCOUNTER SYMPTOMS
AGITATION: 0
SINUS PRESSURE: 0
DYSURIA: 0
BACK PAIN: 0
SHORTNESS OF BREATH: 0
COLOR CHANGE: 0
ARTHRALGIAS: 0
FEVER: 0
DIARRHEA: 0
PALPITATIONS: 0
COUGH: 1
FATIGUE: 0
VOMITING: 0
DIZZINESS: 0
MYALGIAS: 0
CONFUSION: 0
HEADACHES: 0
EYE PAIN: 0
BLOOD IN STOOL: 0
ADENOPATHY: 0
CHEST TIGHTNESS: 1
SINUS PAIN: 0
TROUBLE SWALLOWING: 0
EYE ITCHING: 0
ABDOMINAL PAIN: 0
SORE THROAT: 0
ACTIVITY CHANGE: 0
APPETITE CHANGE: 0
NAUSEA: 0
EYE DISCHARGE: 0

## 2024-01-23 NOTE — PROGRESS NOTES
Subjective      Patient ID: Sayda Thomason is a 33 y.o. female.    Sayda is a 32 yo female who presents with complaints of a cough for a month with related chest pain....    Patient complaining of cough and congestion that started about month ago.  She describes it as productive with thick, yellow mucous.  She feels it gets worse throughout the day and with activity. She denies any wheezing, shortness of breath.  She endorses chest pain, 5/10 with coughing and states she is very concerned about the pain. She is currently on day 4 of azithromycin therapy with no improvement.        The following have been reviewed and updated as appropriate in this visit:   Allergies  Meds  Problems         Past Medical History:   Diagnosis Date   • Abnormal Pap smear of cervix        Past Surgical History:   Procedure Laterality Date   •  SECTION      ,    • COLPOSCOPY     • COSMETIC SURGERY  10/2023    Abdominal plasty   • HERNIA REPAIR  10/2023   • WISDOM TOOTH EXTRACTION         Family History   Problem Relation Age of Onset   • No Known Problems Biological Mother    • Prostate cancer Biological Father    • No Known Problems Biological Sister    • No Known Problems Biological Brother    • No Known Problems Maternal Grandmother    • Pancreatic cancer Maternal Grandfather    • No Known Problems Paternal Grandmother    • No Known Problems Paternal Grandfather        Social History     Socioeconomic History   • Marital status:      Spouse name: Kulwant   • Number of children: Not on file   • Years of education: Not on file   • Highest education level: Not on file   Occupational History   • Not on file   Tobacco Use   • Smoking status: Never   • Smokeless tobacco: Never   Vaping Use   • Vaping Use: Never used   Substance and Sexual Activity   • Alcohol use: Yes     Alcohol/week: 7.0 - 10.0 standard drinks of alcohol     Types: 7 - 10 Glasses of wine per week   • Drug use: Never   • Sexual activity: Yes      Partners: Male     Birth control/protection: OCP   Other Topics Concern   • Not on file   Social History Narrative   • Not on file     Social Determinants of Health     Financial Resource Strain: Not on file   Food Insecurity: No Food Insecurity (8/10/2021)    Hunger Vital Sign    • Worried About Running Out of Food in the Last Year: Never true    • Ran Out of Food in the Last Year: Never true   Transportation Needs: Not on file   Physical Activity: Not on file   Stress: Not on file   Social Connections: Not on file   Intimate Partner Violence: Not on file   Housing Stability: Not on file       No Known Allergies    Current Outpatient Medications   Medication Sig Dispense Refill   • amoxicillin (AMOXIL) 500 mg tablet Take 1 tablet (500 mg total) by mouth 3 (three) times a day for 7 days. 21 tablet 0   • benzonatate (TESSALON PERLES) 100 mg capsule Take 1 capsule (100 mg total) by mouth 3 (three) times a day as needed for cough for up to 20 days. 30 capsule 1   • methylPREDNISolone (MEDROL DOSEPACK) 4 mg tablet Follow package directions. 21 tablet 0   • norethindrone-ethin estradioL 1-35 mg-mcg (21) tablet Take by mouth daily.       No current facility-administered medications for this visit.       Review of Systems   Constitutional: Negative for activity change, appetite change, fatigue and fever.   HENT: Positive for congestion. Negative for sinus pressure, sinus pain, sore throat and trouble swallowing.    Eyes: Negative for pain, discharge, itching and visual disturbance.   Respiratory: Positive for cough and chest tightness. Negative for shortness of breath.    Cardiovascular: Positive for chest pain. Negative for palpitations and leg swelling.   Gastrointestinal: Negative for abdominal pain, blood in stool, diarrhea, nausea and vomiting.   Endocrine: Negative for cold intolerance and heat intolerance.   Genitourinary: Negative for dysuria.   Musculoskeletal: Negative for arthralgias, back pain, gait problem and  myalgias.   Skin: Negative for color change and rash.   Allergic/Immunologic: Negative for environmental allergies and food allergies.   Neurological: Negative for dizziness and headaches.   Hematological: Negative for adenopathy.   Psychiatric/Behavioral: Negative for agitation, behavioral problems and confusion.       Objective     Vitals:    01/23/24 1539   BP: 120/78   Pulse: 95   Resp: 16   SpO2: 99%       Physical Exam  Constitutional:       Appearance: She is well-developed.   HENT:      Head: Normocephalic and atraumatic.      Right Ear: Tympanic membrane, ear canal and external ear normal.      Left Ear: Tympanic membrane, ear canal and external ear normal.      Nose: Nose normal.   Eyes:      Conjunctiva/sclera: Conjunctivae normal.      Pupils: Pupils are equal, round, and reactive to light.   Neck:      Thyroid: No thyromegaly.   Cardiovascular:      Rate and Rhythm: Normal rate and regular rhythm.      Heart sounds: Normal heart sounds. No murmur heard.  Pulmonary:      Effort: Pulmonary effort is normal. No respiratory distress.      Breath sounds: Normal breath sounds. No wheezing or rales.   Abdominal:      General: Bowel sounds are normal. There is no distension.      Palpations: Abdomen is soft. There is no mass.      Tenderness: There is no abdominal tenderness. There is no guarding.   Musculoskeletal:         General: No tenderness. Normal range of motion.      Cervical back: Normal range of motion and neck supple.   Skin:     General: Skin is warm and dry.      Capillary Refill: Capillary refill takes less than 2 seconds.      Findings: No rash.   Neurological:      Mental Status: She is alert and oriented to person, place, and time.      Cranial Nerves: No cranial nerve deficit.      Deep Tendon Reflexes: Reflexes normal.   Psychiatric:         Behavior: Behavior normal.         Thought Content: Thought content normal.         Judgment: Judgment normal.         Assessment/Plan     Problem List  Items Addressed This Visit        Respiratory    Subacute cough - Primary    Current Assessment & Plan     Triple screen  Medrol dose pack  Abx- amoxil  Stay well hydrated, rest, recover         Relevant Orders    SARS-COV-2 (COVID-19)/ FLU A/B, AND RSV, PCR       Infectious/Inflammatory    Viral upper respiratory tract infection    Current Assessment & Plan     Meds?         Relevant Orders    SARS-COV-2 (COVID-19)/ FLU A/B, AND RSV, PCR       Other    Atypical chest pain    Current Assessment & Plan     5/10 chest pain with coughing  EKG to rule out cardiac etiology         Relevant Orders    ECG 12 LEAD OFFICE PERFORMED (Completed)       DIAGNOSIS  1. Subacute cough    2. Viral upper respiratory tract infection    3. Atypical chest pain        No results found.        Uriah Walsh, DO  1/23/2024

## 2024-01-24 ENCOUNTER — TELEPHONE (OUTPATIENT)
Dept: PRIMARY CARE | Facility: CLINIC | Age: 34
End: 2024-01-24

## 2024-01-24 ENCOUNTER — APPOINTMENT (OUTPATIENT)
Dept: URBAN - METROPOLITAN AREA CLINIC 203 | Age: 34
Setting detail: DERMATOLOGY
End: 2024-01-26

## 2024-01-24 DIAGNOSIS — L57.8 OTHER SKIN CHANGES DUE TO CHRONIC EXPOSURE TO NONIONIZING RADIATION: ICD-10-CM

## 2024-01-24 PROBLEM — D23.5 OTHER BENIGN NEOPLASM OF SKIN OF TRUNK: Status: ACTIVE | Noted: 2024-01-24

## 2024-01-24 PROCEDURE — OTHER SUNSCREEN RECOMMENDATIONS: OTHER

## 2024-01-24 PROCEDURE — OTHER PRESCRIPTION MEDICATION MANAGEMENT: OTHER

## 2024-01-24 PROCEDURE — OTHER COUNSELING: OTHER

## 2024-01-24 PROCEDURE — OTHER PRESCRIPTION: OTHER

## 2024-01-24 PROCEDURE — 99213 OFFICE O/P EST LOW 20 MIN: CPT

## 2024-01-24 RX ORDER — BIMATOPROST 0.3 MG/ML
SOLUTION/ DROPS OPHTHALMIC QD
Qty: 3 | Refills: 10 | Status: ERX | COMMUNITY
Start: 2024-01-24

## 2024-01-24 RX ORDER — TRETIONIN 0.25 MG/G
CREAM TOPICAL QHS
Qty: 45 | Refills: 4 | Status: ERX | COMMUNITY
Start: 2024-01-24

## 2024-01-24 ASSESSMENT — LOCATION SIMPLE DESCRIPTION DERM
LOCATION SIMPLE: LEFT BREAST
LOCATION SIMPLE: RIGHT CHEEK
LOCATION SIMPLE: LEFT CHEEK

## 2024-01-24 ASSESSMENT — LOCATION ZONE DERM
LOCATION ZONE: TRUNK
LOCATION ZONE: FACE

## 2024-01-24 ASSESSMENT — LOCATION DETAILED DESCRIPTION DERM
LOCATION DETAILED: RIGHT CENTRAL MALAR CHEEK
LOCATION DETAILED: LEFT MEDIAL BREAST 10-11:00 REGION
LOCATION DETAILED: LEFT INFERIOR MEDIAL MALAR CHEEK
LOCATION DETAILED: LEFT MEDIAL BREAST 11-12:00 REGION

## 2024-01-24 NOTE — TELEPHONE ENCOUNTER
----- Message from Uriah Walsh DO sent at 1/24/2024  8:11 AM EST -----  Ceci, please let my know her Triple screen was negative, GOOD!

## 2024-02-07 ENCOUNTER — TELEPHONE (OUTPATIENT)
Dept: PRIMARY CARE | Facility: CLINIC | Age: 34
End: 2024-02-07

## 2024-02-07 NOTE — TELEPHONE ENCOUNTER
Request for Medical Advice (NON-URGENT)   Patient PCP: Uriah Walsh, DO  New or Existing Issue: existing    Question or Concern: Pt was given steroids on 1/23.  She didn't take them and wants to start today as she is still having symptoms.  However, she took 2 aleve this morning.  She wants to know if it is ok to take the steroids seeing she took the aleve.    Please call pt to advise.    Preferred Pharmacy:   [facility] - [place] [facility] [address] [facility]  [place] [address]  Phone: [phone] Fax: [phone]    [facility]/pharmacy #[id number] - [place], [place] - [address] [facility]  [address] [facility] [address]  Phone: [phone] Fax: [phone]    [facility]/pharmacy #[id number] [place], [place] - [address] [address]  Phone: [phone] Fax: [phone]      The practice will reach out to discuss your Medical Question or Concern within 2 business days.

## 2024-02-21 ENCOUNTER — TELEPHONE (OUTPATIENT)
Dept: PRIMARY CARE | Facility: CLINIC | Age: 34
End: 2024-02-21

## 2024-02-21 DIAGNOSIS — M50.20 HERNIATED DISC, CERVICAL: Primary | ICD-10-CM

## 2024-02-21 RX ORDER — MELOXICAM 15 MG/1
15 TABLET ORAL DAILY
Qty: 90 TABLET | Refills: 0 | Status: SHIPPED | OUTPATIENT
Start: 2024-02-21 | End: 2025-03-18

## 2024-02-21 NOTE — TELEPHONE ENCOUNTER
Medication Request   Patient PCP: Uriah Walsh, DO  Next Office Visit: Visit date not found  Has this provider prescribed this medication before?: no  Medication Name:meloxicam (MOBIC)   Medication Dose: 15 Mg  Medication Frequency: Take 1 tablet (15 mg total) by mouth daily.  Preferred Pharmacy:   [facility] - [place] [address] [facility] [address]  Phone: [phone] Fax: [phone]    [facility]/pharmacy #[id number] - [place], [place] - [address] [facility]  [address] [facility] [address]  Phone: [phone] Fax: [phone]    [facility]/pharmacy #[id number] [facility] [address] [address]  Phone: [phone] Fax: [phone]      Please allow 2 business days for your provider to send your medication request or to reach out to discuss.

## 2024-02-21 NOTE — TELEPHONE ENCOUNTER
Pt would like a scrip for Physical therapy for a herniated disk. Pt [facility] to complete therapy. Please contact Pt with any updates.Thank You!

## 2024-02-21 NOTE — TELEPHONE ENCOUNTER
Spoke with pt who stated she was on Melxicam in the past for back and neck pain she was having. She now knows she has 2 herniated discs in her neck and she thinks she has one in her back too. She is asking for medication for the pain and what specialist you would refer her to. She said  if you prescribe her medication, does she need to stop any otc ibuprofen/ tylenol while she is on the prescription you send in.

## 2024-02-21 NOTE — TELEPHONE ENCOUNTER
Spoke with pt and gave her number for Dr. Muir as well as number for PT. Meloxicam pended, please send. Pt knows not to take ibuprofen with this medication.

## 2024-02-22 ENCOUNTER — TELEPHONE (OUTPATIENT)
Dept: REGISTRATION | Facility: REHABILITATION | Age: 34
End: 2024-02-22
Payer: COMMERCIAL

## 2024-02-22 NOTE — TELEPHONE ENCOUNTER
PT eval 3/8 at 10am  
Simple: Patient demonstrates quick and easy understanding/Patient asked questions/Verbalized Understanding

## 2024-02-26 ENCOUNTER — TELEPHONE (OUTPATIENT)
Dept: REGISTRATION | Facility: REHABILITATION | Age: 34
End: 2024-02-26
Payer: COMMERCIAL

## 2024-02-26 NOTE — TELEPHONE ENCOUNTER
Left voicemail for patient to reschedule her Eval for 3/7/24 at 11:00am with Jazmine Cooper. Left 5000x.

## 2024-02-29 ENCOUNTER — OFFICE VISIT (OUTPATIENT)
Dept: ORTHOPEDICS | Facility: CLINIC | Age: 34
End: 2024-02-29
Payer: COMMERCIAL

## 2024-02-29 VITALS — HEIGHT: 65 IN | BODY MASS INDEX: 20.49 KG/M2 | WEIGHT: 123 LBS

## 2024-02-29 DIAGNOSIS — M51.26 HNP (HERNIATED NUCLEUS PULPOSUS), LUMBAR: Primary | ICD-10-CM

## 2024-02-29 DIAGNOSIS — M54.12 CERVICAL RADICULOPATHY: ICD-10-CM

## 2024-02-29 DIAGNOSIS — M48.02 CERVICAL SPINAL STENOSIS: ICD-10-CM

## 2024-02-29 DIAGNOSIS — M54.16 LUMBAR RADICULOPATHY: ICD-10-CM

## 2024-02-29 DIAGNOSIS — M50.20 HNP (HERNIATED NUCLEUS PULPOSUS), CERVICAL: ICD-10-CM

## 2024-02-29 PROCEDURE — 99204 OFFICE O/P NEW MOD 45 MIN: CPT | Performed by: ORTHOPAEDIC SURGERY

## 2024-02-29 PROCEDURE — 3008F BODY MASS INDEX DOCD: CPT | Performed by: ORTHOPAEDIC SURGERY

## 2024-03-06 ENCOUNTER — HOSPITAL ENCOUNTER (EMERGENCY)
Facility: HOSPITAL | Age: 34
Discharge: HOME | End: 2024-03-06
Attending: EMERGENCY MEDICINE
Payer: COMMERCIAL

## 2024-03-06 ENCOUNTER — APPOINTMENT (EMERGENCY)
Dept: RADIOLOGY | Facility: HOSPITAL | Age: 34
End: 2024-03-06
Payer: COMMERCIAL

## 2024-03-06 VITALS
OXYGEN SATURATION: 100 % | BODY MASS INDEX: 19.99 KG/M2 | SYSTOLIC BLOOD PRESSURE: 149 MMHG | DIASTOLIC BLOOD PRESSURE: 91 MMHG | HEART RATE: 86 BPM | RESPIRATION RATE: 15 BRPM | TEMPERATURE: 99.4 F | HEIGHT: 65 IN | WEIGHT: 120 LBS

## 2024-03-06 DIAGNOSIS — M51.27 HERNIATION OF INTERVERTEBRAL DISC BETWEEN L5 AND S1: Primary | ICD-10-CM

## 2024-03-06 DIAGNOSIS — M54.17 LUMBOSACRAL RADICULOPATHY AT S1: ICD-10-CM

## 2024-03-06 PROCEDURE — 72148 MRI LUMBAR SPINE W/O DYE: CPT | Mod: ME

## 2024-03-06 PROCEDURE — 63700000 HC SELF-ADMINISTRABLE DRUG: Performed by: PHYSICIAN ASSISTANT

## 2024-03-06 PROCEDURE — 99284 EMERGENCY DEPT VISIT MOD MDM: CPT | Mod: 25

## 2024-03-06 RX ORDER — ALPRAZOLAM 0.5 MG/1
0.5 TABLET ORAL ONCE
Status: COMPLETED | OUTPATIENT
Start: 2024-03-06 | End: 2024-03-06

## 2024-03-06 RX ORDER — PREDNISONE 10 MG/1
TABLET ORAL
Qty: 15 TABLET | Refills: 0 | Status: SHIPPED | OUTPATIENT
Start: 2024-03-06 | End: 2025-03-18

## 2024-03-06 RX ADMIN — ALPRAZOLAM 0.25 MG: 0.5 TABLET ORAL at 10:37

## 2024-03-06 NOTE — DISCHARGE INSTRUCTIONS
Your MRI shows herniation of the L5/S1 discs with compression of the S1 nerve root.  There is no evidence of cauda equina or central cord compression at this time.  Continue to follow-up with orthopedic surgeon as outpatient.  You may also seek a consultation with a neurosurgeon.  Avoid any heavy exercise or heavy lifting.  Light exercises appropriate to continue.    You may take motrin (600 mg) every 6 hours for pain. You may also take tylenol (500 mg) every 6 hours for pain. You may alternate the two medications every 3 hours for complete pain control.    Please take the prednisone taper as prescribed to you to help alleviate your symptoms.  Return to the emergency department for worsening symptoms, loss of motor function, loss of sensation, or any new/concerning symptoms.

## 2024-03-06 NOTE — ED ATTESTATION NOTE
Procedures  Physical Exam  Review of Systems    3/6/58927:54 AM  I have personally seen and examined the patient.  I personally performed the key   components of the encounter and provided a substantive portion of the care and   medical decision making for this patient.     I have reviewed and agree with the PA/NP/Resident's assessment and ED plan of care, with any exceptions as documented below.  My examination, assessment and plan of care of Sayda Thomason is as follows:    Patient is a 33-year-old female who presents with low back discomfort, patient reports a history of herniated disks in her neck, she has been having lower back discomfort associated with numbness in her legs and reports that she began having some urinary incontinence yesterday, no fevers reported, no recent instrumentation of her back, no significant injuries involving her low back    Exam:   Vital signs have been reviewed, pulse ox is 100% on room air, normal  Heart: RRR, normal S1/S2  Lungs: CTA bilaterally  Abdo: soft, non-tender    Plan/Medical Decision Making: Patient is a 89-liz[age]-gyla-old female who presents with low back discomfort, lower extremity weakness and numbness and urinary incontinence, checking imaging and reevaluate afterwards      This document was created using Dragon Dictation software. There might be some typographical errors due to this technology.          Godshall, Duane K, MD  03/06/24 0978

## 2024-03-06 NOTE — ED PROVIDER NOTES
Emergency Medicine Note  HPI   HISTORY OF PRESENT ILLNESS     33 year old F presents to the ED for evaluation.  Reports RLE weakness and numbness for the past several months.  Was seen by orthopedic last week who ordered MRI of her lumbar spine.  Now notes tingling in the left foot.  Had one episode of urinary incontinence four days ago.  She has noted less frequent urges for urination and having bowel movement. Denies any bowel incontinence. Denies any recent falls or trauma. No fever/chills. Pt denies IVDU, T2DM, etoh use/cirrhosis, chronic steroid use, use of immunosupressing Rx, and any recent spinal procedures.           Patient History   PAST HISTORY     Reviewed from Nursing Triage:       Past Medical History:   Diagnosis Date    Abnormal Pap smear of cervix     Herniated disc, cervical        Past Surgical History:   Procedure Laterality Date     SECTION      ,     COLPOSCOPY      COSMETIC SURGERY  10/2023    Abdominal plasty    HERNIA REPAIR  10/2023    WISDOM TOOTH EXTRACTION         Family History   Problem Relation Age of Onset    No Known Problems Biological Mother     Prostate cancer Biological Father     No Known Problems Biological Sister     No Known Problems Biological Brother     No Known Problems Maternal Grandmother     Pancreatic cancer Maternal Grandfather     No Known Problems Paternal Grandmother     No Known Problems Paternal Grandfather        Social History     Tobacco Use    Smoking status: Never    Smokeless tobacco: Never   Vaping Use    Vaping Use: Never used   Substance Use Topics    Alcohol use: Yes     Alcohol/week: 7.0 - 10.0 standard drinks of alcohol     Types: 7 - 10 Glasses of wine per week    Drug use: Never         Review of Systems   REVIEW OF SYSTEMS     Review of Systems      VITALS     ED Vitals      Date/Time Temp Pulse Resp BP SpO2 [facility]   24 0925 37.4 °C (99.4 °F) 86 15 149/91 100 % MJR                         Physical Exam   PHYSICAL EXAM      Physical Exam  Vitals and nursing note reviewed.   Constitutional:       General: She is not in acute distress.     Appearance: Normal appearance. She is well-developed and normal weight. She is not ill-appearing.   HENT:      Head: Atraumatic.   Eyes:      Conjunctiva/sclera: Conjunctivae normal.   Pulmonary:      Effort: Pulmonary effort is normal. No respiratory distress.   Musculoskeletal:         General: No deformity. Normal range of motion.      Cervical back: Normal range of motion and neck supple.   Skin:     General: Skin is warm and dry.   Neurological:      Mental Status: She is alert.      Comments: Normal function of extensor hallucis longus/flexor hallucis longus bilaterally.  Deep peroneal sensation intact bilaterally. DP and PT pulses 2+ bilaterally.  No saddle anesthesia. Able to ambulate normally.    Psychiatric:         Mood and Affect: Mood normal.         Behavior: Behavior normal.           PROCEDURES     Procedures     DATA     Results       None            Imaging Results              MRI LUMBAR SPINE WITHOUT CONTRAST (Final result)  Result time 03/06/24 12:03:42      Final result                   Impression:    IMPRESSION:  L5-S1 disc herniation impinging the traversing right S1 nerve root. No cauda  equina compression or evidence of significant central canal stenosis.      COMMENT:  There are presumed 5 fully formed lumbar vertebra assuming classic anatomy, with  the last fully formed disc space labeled as L5-S1.    Alignment and lordosis:  Anatomic.    Vertebral body height and signal:  Maintained in height and signal.    Intervertebral discs: Disc desiccation at L5-S1 with slight loss of height.    Conus medullaris:  Terminates at L1 with normal signal and morphology of the  partially imaged distal thoracic cord.    L1-L2:  No disc herniation, central canal stenosis, or neural foraminal  narrowing.    L2-L3:  No disc herniation, central canal stenosis, or neural  foraminal  narrowing.    L3-L4:  No disc herniation, central canal stenosis, or neural foraminal  narrowing.    L4-L5:  No disc herniation, central canal stenosis, or neural foraminal  narrowing.    L5-S1:  Right paracentral disc herniation extending into the right subarticular  recess, resulting in marked right subarticular recess narrowing with impingement  of the traversing right S1 nerve root. No significant central canal stenosis or  neural foraminal narrowing.                         Narrative:    CLINICAL HISTORY: Low back pain, progressive neurologic deficit  low back pain; + neuro deficits  Comparison: No relevant prior studies    Study: MRI of the lumbar spine is performed utilizing multiple pulse sequences  in multiple planes without contrast.                                      No orders to display       Scoring tools                                  ED Course & MDM   MDM / ED COURSE / CLINICAL IMPRESSION / DISPO     Medical Decision Making  33 year old F presents with one episode of urinary incontinence. Currently being work up for lumbar disc disease by an orthopedist as an outpatient. Denies back pain, however, has noted numbness/weakness in the rt foot x several months which is what lead to her seeking orthopedic evaluation.  Neurologic exam re-assuring in the ED, however, d/t her sxs MRI of the l-spine was ordered which showed L5/s1 disc herniation with impingement of the right s1 nerve root. MRI did not show any cauda equina compression or significant central canal stenosis. Recommending ortho and/or neurosurgery f/u as an outpatient. Rx of prednisone sent to preferred pharmacy. Discussed strict return precautions.     Problems Addressed:  Herniation of intervertebral disc between L5 and S1: acute illness or injury  Lumbosacral radiculopathy at S1: acute illness or injury    Amount and/or Complexity of Data Reviewed  Radiology: ordered. Decision-making details documented in ED Course.    Risk  OTC  drugs.  Prescription drug management.        ED Course as of 03/10/24 1226   Wed Mar 06, 2024   1205 MRI LUMBAR SPINE WITHOUT CONTRAST  IMPRESSION: L5-S1 disc herniation impinging the traversing right S1 nerve root. No cauda  equina compression or evidence of significant central canal stenosis [CW]   1235 MRI with L5-S1 disc herniation which is impinging the right S1 nerve root.  No cauda equina or central canal stenosis.  Discussed case with ED attending with plan to discharge home with Rx of prednisone and orthopedic/neurosurgery follow-up as outpatient [CW]      ED Course User Index  [CW] Mony Torres PA C     Clinical Impression      Herniation of intervertebral disc between L5 and S1   Lumbosacral radiculopathy at S1     _________________       ED Disposition   Discharge                       Mony Torres PA C  03/10/24 1226

## 2024-03-07 ENCOUNTER — TELEPHONE (OUTPATIENT)
Dept: NEUROLOGY | Facility: CLINIC | Age: 34
End: 2024-03-07
Payer: COMMERCIAL

## 2024-03-07 NOTE — TELEPHONE ENCOUNTER
Name: Sayda Thomason   Phone: [phone]  Level/Region: lumbar  Referring: Ezequiel  Has another surgeon recommended surgery? No  MRI month/year: 3/6/2024   MRI location[facility]  Other workup/treatment (surgery/EMG/injections): No    The patient was offered the next available appointment.  I informed her that we would collect outside records to facilitate her new patient appointment.

## 2024-03-07 NOTE — TELEPHONE ENCOUNTER
Called patient offered new date for 3/21/2024 @ 11:30 patient accepted,  she is still asking if there is anyway someone could call her and wanted to make sure her imaging was reviewed.

## 2024-03-07 NOTE — TELEPHONE ENCOUNTER
Patient is scheduled for a New Patient consult on 4/8/2024 and was hoping you can review in images she has in the system from 3/6/2024? She will be going to [place] on 3/23/24-4/7/24 and was hoping to get a sooner appointment prior to her leaving?Please Advise?

## 2024-03-07 NOTE — TELEPHONE ENCOUNTER
Imaging completed within [facility]    Sent to neurosurgery team to review appt date/time

## 2024-03-11 ENCOUNTER — TELEPHONE (OUTPATIENT)
Dept: NEUROSURGERY | Facility: CLINIC | Age: 34
End: 2024-03-11
Payer: COMMERCIAL

## 2024-03-11 ENCOUNTER — TELEPHONE (OUTPATIENT)
Dept: PRIMARY CARE | Facility: CLINIC | Age: 34
End: 2024-03-11
Payer: COMMERCIAL

## 2024-03-11 NOTE — TELEPHONE ENCOUNTER
"ED D/C follow up Call     Called patient to discuss recent ED visit to [facility]'s ED on 3/6/24 s/p tailbone pain; herniation of intervertebral disc between L5 and S1; lumbosacral radiculopathy at S1.  Patient reports \"symptoms are still present but it feels more like weakness as opposed to pain\"  And states \"the prednisone prescribed while in the ED really didn't help much.\"  She did follow up with a neurologist today and has a follow up appointment with a neurosurgeon in 2 weeks per ED discharge instructions.  Offered to schedule patient for an ED follow up with PCP, however, she declines appointment at this time, as she will continue with follow up with the neurosurgeon first.  RTED if any new onset of symptoms or if  return and/or worsening of symptoms present.  Verbalizes understanding and patient has no questions at this time.    Patient also states \"she is flying next week and would like a script for xanax.\"  Med is not on patient's current med list to que and is not listed on med list as taking as of patient's last OV on 1/23/24.    If appropriate, please order.    "

## 2024-03-11 NOTE — TELEPHONE ENCOUNTER
New Patient:   Referred to Dr. Holland    Referring Provider: Self    PCP: Dr. Uriah Walsh    MRI: Cervical 10/16/23 and Lumbar 3/6/24 @ [facility]     X-rays: No    Dexa: No    EMG: No    Onset of sympotms/reason for visit:   Patient's symptoms began Aug. '23 after exercising. No neck pain. Right shoulder pain. Numbness, tingling and weakness in both arms from biceps down to hands. Trouble with fine motor skills. No back pain. Numbness and weakness in legs, ankles and feet of both legs, right worse than left.     Physical Therapy: No    Pain Management: No    Previous Surgery: No    Endocrinologist: No    Rheumatologist: No    Insurance: [employer] Shannen confirmed we accept it    W/C or Auto: No

## 2024-03-12 ENCOUNTER — TELEPHONE (OUTPATIENT)
Dept: PRIMARY CARE | Facility: CLINIC | Age: 34
End: 2024-03-12
Payer: COMMERCIAL

## 2024-03-12 DIAGNOSIS — M54.16 LUMBAR RADICULOPATHY: ICD-10-CM

## 2024-03-12 DIAGNOSIS — M54.12 CERVICAL RADICULOPATHY: Primary | ICD-10-CM

## 2024-03-12 RX ORDER — ALPRAZOLAM 0.5 MG/1
0.5 TABLET ORAL SEE ADMIN INSTRUCTIONS
Qty: 20 TABLET | Refills: 0 | Status: SHIPPED | OUTPATIENT
Start: 2024-03-12 | End: 2024-04-11

## 2024-03-12 NOTE — TELEPHONE ENCOUNTER
Patient scheduled 4/25 @ 11am. NP MRI [facility]. Advised to have xrays prior. Will do at [facility]. NP ppw mailed with scripts.

## 2024-03-12 NOTE — TELEPHONE ENCOUNTER
----- Message from Ceci Schafer MA sent at 3/12/2024  8:09 AM EDT -----  Regarding: FW: Refill   Contact: [phone]  Not on current list, please advise.   ----- Message -----  From: Sayda Thomason  Sent: 3/11/2024   5:25 PM EDT  To: [facility] Clinical Support P  Subject: Refill                                           Hello,    I need a new Xanax prescription refill. We are traveling/flying a lot in the next few months. I have been taking 1.5-2 pills for flying of the 0.25. Would you be able to increase the dose slightly from 0.25?     Thanks so much,  aSyda

## 2024-03-14 ENCOUNTER — TELEPHONE (OUTPATIENT)
Dept: PRIMARY CARE | Facility: CLINIC | Age: 34
End: 2024-03-14
Payer: COMMERCIAL

## 2024-03-14 DIAGNOSIS — R53.83 MALAISE AND FATIGUE: Primary | ICD-10-CM

## 2024-03-14 DIAGNOSIS — R53.81 MALAISE AND FATIGUE: Primary | ICD-10-CM

## 2024-03-14 DIAGNOSIS — F41.9 ANXIETY: Chronic | ICD-10-CM

## 2024-03-14 DIAGNOSIS — R53.1 WEAKNESS: ICD-10-CM

## 2024-03-14 RX ORDER — SERTRALINE HYDROCHLORIDE 25 MG/1
25 TABLET, FILM COATED ORAL DAILY
Qty: 90 TABLET | Refills: 3 | Status: SHIPPED | OUTPATIENT
Start: 2024-03-14 | End: 2024-08-09 | Stop reason: SDUPTHER

## 2024-03-14 NOTE — TELEPHONE ENCOUNTER
----- Message from Uriah Walsh DO sent at 3/14/2024 10:14 AM EDT -----  Regarding: JESIKA: Anthony   Contact: [phone]  Call her and see how she is, If needed after triage, then make her an appt with amny provider SDS today or tomorrow in [place].  IIn the mean time, you can tag up CBC diff, CMP, MONO test, TSH, B-12 and Magnesium, Taged to Malaise and fatigue, weakness, TYSM  Dr. REEVES  ----- Message -----  From: Ceci Schafer MA  Sent: 3/14/2024   9:36 AM EDT  To: Uriah Walsh DO  Subject: FW: Refill                                       Do you want pt to schedule an appointment for eval or do you want to send in a lab order?  ----- Message -----  From: Sayda Thomason  Sent: 3/13/2024   3:30 PM EDT  To: [facility] Clinical Support P  Subject: Refill                                           Hi,  Is someone able to give me a call today. I have been having extreme muscle weakness and fatigue for past few weeks. I wanted to talk about scheduling blood work to check all my levels.     Thanks so much,  Sayda  [phone]

## 2024-03-14 NOTE — TELEPHONE ENCOUNTER
Spoke with pt who stated she has just been having overall weakness for the past few weeks/ months and declined appt. Pt notified labs ordered to [facility] and if normal and s/s persist would need appt for further eval. Pt stated she saw her neurologist and has a nerve test scheduled for tomorrow.    Pt stated she had an appointment with her therapist yesterday and she recommended Zoloft 25 mg to help with her anxiety and is asking if you would be willing to prescribe this for her. Please advise.

## 2024-03-18 ENCOUNTER — HOSPITAL ENCOUNTER (OUTPATIENT)
Dept: PHYSICAL THERAPY | Age: 34
Setting detail: THERAPIES SERIES
Discharge: HOME | End: 2024-03-18
Attending: FAMILY MEDICINE
Payer: COMMERCIAL

## 2024-03-18 DIAGNOSIS — M50.20 HERNIATED DISC, CERVICAL: ICD-10-CM

## 2024-03-18 PROCEDURE — 97530 THERAPEUTIC ACTIVITIES: CPT | Mod: GP

## 2024-03-18 PROCEDURE — 97161 PT EVAL LOW COMPLEX 20 MIN: CPT | Mod: GP

## 2024-03-18 NOTE — OP PT TREATMENT LOG
ORTHO PT FLOWSHEET    Exercises Current Session Time Completed (Y/N/G)   MODALITIES- HEAT/ICE (15661) TOTAL TIME FOR SESSION Not performed    Heat     Ice     MODALITIES - E-STIM (04478) TOTAL TIME FOR SESSION Not performed    E-stim/H-Wave     THER ACT (11795) TOTAL TIME FOR SESSION 8-22 Minutes    Assessment      Vitals/pain See note Y   Education 3/18/24: purpose of mechanical exam, results of exam, further need to investigate results of movements with HEP, plan of care with recommended frequency - patient verbalizes agreement and understanding Y   Body Mechanics/Work Training     GROUP (28907)  Not performed    THER EX (24674) TOTAL TIME FOR SESSION 0-7 Minutes    HEP 3/18/24: cervical retraction, retraction/extension in sitting (printed) Y   CARDIOVASCULAR      UBE     STRENGTHENING     Prone I     Prone shoulder ER     Prone T     Snow haley on half foam     No money     Wall slide with lift off     Lat pull down          STRETCHING/ROM     Thoracic extension in sitting     Cervical retraction with self overpressure 2 x 10 Y   Cervical retraction/extension in sitting 1 x 8 Y   Neural glides     Doorway stretch          NEURO RE-ED (23228) TOTAL TIME FOR SESSION Not performed    Craniocervical flexion test               Taping     MANUAL (22415) TOTAL TIME FOR SESSION 0-7 Minutes    Retraction with clinician overpressure      Supine cervical retraction/ext with traction 1 x 5 Y   Thoracic mobilization     Y = yes; N = no; G = group; NV = next visit; H = hold

## 2024-03-19 DIAGNOSIS — M54.50 LUMBAR PAIN: Primary | ICD-10-CM

## 2024-03-19 NOTE — PROGRESS NOTES
Physical Therapy Evaluation    [facility] OP Therapy Fax: [phone]    PT EVALUATION FOR OUTPATIENT THERAPY    Patient: Sayda Thomason    MRN: [id number]  : 1990 33 y.o.     Referring Physician: Uriah Walsh DO  Date of Visit: 3/18/2024      Certification Dates:  24 through 05/15/24         Recommended Frequency & Duration:  2 times/week for up to 8 weeks     Diagnosis:   1. Herniated disc, cervical        Chief Complaints:   Chief Complaint   Patient presents with   • Dec ROM   • Dec Strength   • Pain   •  Difficulty Performing Work/school Tasks   • Decreased recreational/play activity       Precautions: no known precautions/restrictions  Precautions additional comments:      Past Medical History:   Past Medical History:   Diagnosis Date   • Abnormal Pap smear of cervix    • Herniated disc, cervical        Past Surgical History:   Past Surgical History:   Procedure Laterality Date   •  SECTION      ,    • COLPOSCOPY     • COSMETIC SURGERY  10/2023    Abdominal plasty   • HERNIA REPAIR  10/2023   • WISDOM TOOTH EXTRACTION           LEARNING ASSESSMENT    Assessment completed:  Yes    Learner name:  Sayda    Learner: Patient    Learning Barriers:  Learning barriers: No Barriers    Preferred Language: English     Needed: No    Education Provided:   Method: Discussion, Handout and Demonstration  Readiness: acceptance  Response: Demonstrated understanding and Verbalizes understanding      CO-LEARNER ASSESSMENT:    Completed: No          Welcome letter discussed: Yes Patient provided with Welcome Letter, which includes attendance policy. Provided education regarding cancellation and no-show policy. Education regarding the importance of participation and regular attendance to maximize goal attainment.         OBJECTIVE MEASUREMENTS/DATA:    Time In Session:  Start Time: 1659  Stop Time: 1758  Time Calculation (min): 59 min   Assessment and Plan - 24        Assessment     Plan of Care reviewed and patient/family in agreement Yes     System Pathology/Pathophysiology Noted musculoskeletal;neuromuscular     Functional Limitations in Following Categories (PT Eval) home management;work;community/leisure     Rehab Potential/Prognosis good, to achieve stated therapy goals     Problem List decreased ROM;decreased strength;impaired motor control;impaired sensation;pain     Clinical Assessment Sayda Thomason is a 33 year-old female referred to outpatient physical therapy with a diagnosis of cervical disc herniation. Chief complaints include upper extremity weakness and limited fine motor coordination. The patient presents with limited cervical range of motion, hypomobility of the cervicothoracic junction, and peripheral neural sensitivity. The PSFS and NDI questionnaire scores show overall moderate functional limitation. Numbness is not present during the exam. Subjectively limited coordination in typing is used as a comparable sign. Within the mechanical exam in the first visit, neither comfortable range of motion nor coordination in typing changes. The patient is provided a home program to further investigate the effects of movement over a longer period to determine a mechanical source for current complaints. Ms. Thomason will benefit from physical therapy to address noted impairments, improve overall function, and return to desired recreation.     Plan and Recommendations Begin physical therapy intervention to improve quality of life.     Planned Services CPT 75559 Therapeutic exercises;CPT 60204 Therapeutic activities;CPT 02945 Neuromuscular Reeducation;CPT 37542 Manual therapy;CPT 41229 Hot/Cold Packs therapy;CPT 44813 Electrical stimulation UNATTENDED                General Information - 03/18/24 0157        Session Details    Document Type initial evaluation     Mode of Treatment individual therapy        General Information    Referring Physician Uriah Walsh DO     History of  present illness/functional impairment Sayda Thomason started experiencing leg numbness while running on the treadmill at the end of August 2023. The numbness progressed to include bilateral hands, and those symptoms have persisted. She reports bilateral arm weakness and loss of fine motor skills. Leg numbness subsided for a while, but that has returned as well as foot weakness. Originally, the patient noted some neck pain and right scapular pain. Neck pain still comes and goes intermittently, but it is relatively mild compared to arm symptoms. Looking upward or to the side makes the neck feel tight. The patient had been going to [employer], but she transitioned to a lower level independent program. She would like to improve her upper extremity coordination and return to her previous level of exercise.     Patient/Family/Caregiver Comments/Observations The patient reports intermittent neck pain, upper extremity paresthesias, arm weakness, and a loss of coordination.     Existing Precautions/Restrictions no known precautions/restrictions                Pain/Vitals - 03/18/24 1659        Pain Assessment    Currently in pain Yes     Preferred Pain Scale number (Numeric Rating Pain Scale)     Pain: Body location Neck     Pain Rating (0-10): Pre Activity 3     Pain Rating (0-10): Post Activity --   not rated    Pain Level at Best 0     Pain Level at Worst 6        Pre Activity Vital Signs    Pulse 73     /85     BP Location Left upper arm     BP Method Automatic     Patient Position Sitting        Pain Intervention    Intervention  manual, exercise     Post Intervention Comments no change in baseline                Falls/Food Screening - 03/18/24 1659        Initial Falls Assessment    One or more falls in the last year No        Food Insecurity    Within the past 12 months, you worried that your food would run out before you got the money to buy more. Never true     Within the past 12 months, the food you bought  just didn't last and you didn't have money to get more. Never true                PT - 03/18/24 1659        Physical Therapy    Physical Therapy Specialty Spine PT        PT Plan    Frequency of treatment 2 times/week     PT Duration 8 weeks     PT Cert From 03/18/24     PT Cert To 05/15/24     Date PT POC was sent to provider 03/18/24     Signed PT Plan of Care received?  No                   Living Environment    Living Environment - 03/18/24 1659        Living Environment    People in Home spouse     Living Arrangements house     Transportation Concerns none               PLOF:    Prior Level of Function - 03/18/24 2000        OTHER    Previous level of function The patient is previously not limited in daily activity, work, or recreation. She is a stay-at-home mom.               Myotomes and Dermatomes    Myotomes/Dermatomes - 03/18/24 1659        Myotome/Dermatome    Myotome/Dermatome Testing Cervical Myotome Testing (Group);Dermatome Testing (Group)        Cervical Myotome Testing    Left C1 Myotome: Neck Flexors intact     Right C1 Myotome: Neck Flexors intact     Left C2 Myotome: Neck Extensors intact     Right C2 Myotome: Neck Extensors intact     Left C3 Myotome: Neck Lateral Flexion intact     Right C3 Myotome: Neck Lateral Flexion intact     Left C4 Myotome: Shoulder Girdle Elevation intact     Right C4 Myotome: Shoulder Girdle Elevation intact     Left C5 Myotome: Shoulder Abductors intact     Right C5 Myotome: Shoulder Abductors intact     Left C6 Myotome: Elbow Flexors intact     Right C6 Myotome: Elbow Flexors intact     Left C7 Myotome: Elbow Extensors intact     Right C7 Myotome: Elbow Extensors intact     Left C8 Myotome: Finger Flexion intact     Right C8 Myotome: Finger Flexion intact     Left T1 Myotome: Finger Abductors intact     Right T1 Myotome: Finger Abductors intact        Dermatome Testing    Left C2 Dermatome intact light touch sensation     Right C2 Dermatome intact light touch  sensation     Left C3 Dermatome intact light touch sensation     Right C3 Dermatome intact light touch sensation     Left C4 Dermatome intact light touch sensation     Right C4 Dermatome intact light touch sensation     Left C5 Dermatome intact light touch sensation     Right C5 Dermatome intact light touch sensation     Left C6 Dermatome intact light touch sensation     Right C6 Dermatome intact light touch sensation     Left C7 Dermatome intact light touch sensation     Right C7 Dermatome intact light touch sensation     Left C8 Dermatome intact light touch sensation     Right C8 Dermatome intact light touch sensation     Left T1 Dermatome intact light touch sensation     Right T1 Dermatome intact light touch sensation               DTR    Deep Tendon Reflexes - 03/18/24 2000        Deep Tendon Reflexes    Left Biceps Reflex 2-->average, normal     Right Biceps Reflex 2-->average, normal     Left Brachioradial Reflex 2-->average, normal     Right Brachioradial Reflex 2-->average, normal     Left Triceps Reflex 2-->average, normal     Right Triceps Reflex 2-->average, normal               ROM    Range of Motion - 03/18/24 1700        Cervical AROM    Cervical Flexion 55     Cervical Extension 55   tightness CT junction    Cervical Left SB 35   tightness on left side    Cervical Right SB 40     Cervical Left Rotation 68     Cervical Right Rotation 68     Comments:  cervical retraction minimal to no loss        Additional ROM Measurements    Additional ROM  thoracic extension minimal to no loss     Additional ROM  thoracic rotation no loss B               MMT    Manual Muscle Tests - 03/18/24 1659        RIGHT: Upper Extremity Manual Muscle Test Assessment    Right UE MMT Values recorded in pounds 74 pounds        LEFT: Upper Extremity Manual Muscle Test Assessment    Left UE MMT Values recorded in pounds 72 pounds               Palpation    Palpation - 03/18/24 1659        Palpation    Neck Palpation  tenderness of  C5 and hypomobility of cervicothoracic junction        Manual Interventions    Manual technique #1 cervical retraction/extension with traction - no change in baseline (coordination in typing)               Ortho Special Tests    Orthopedic Special Tests - 03/18/24 1700        Cervical/Thoracic    Cervical Distraction --   no pain with traction, no distal symptoms at baseline    Cervical/Thoracic Comments: repeated retraction with self overpressure - no effect; repeated retraction/extension - end range pain, no change        Neurodynamic     ULTT Median Right - Negative;Left - Positive     ULTT Radial Right - Negative;Left - Negative     ULTT Ulnar Right - Negative;Left - Positive               Outcome Measures    PT Outcome Measures - 03/18/24 2000        Subjective Outcome Measures    NDI 11/50 (22% limitation)        NEW (2/6/23):  PSFS     PSFS ACTIVITY 1 laundry     PSFS ANSWER 1 6     PSFS ACTIVITY 2 dishes     PSFS ANSWER 2 6     PSFS ACTIVITY 3 typing/writing     PSFS ANSWER 3 5     PSFS ACTIVITY 4 going to gym     PSFS ANSWER 4 1     PSFS ACTIVITY 5 texting     PSFS ANSWER 5 5     PSFS Sum of Activity Scores 23     PSFS Number of Activities 5     PSFS Percent Score 46 %                  Goals        Patient Stated    •  <enter goal here> (pt-stated)        Other    •  Mutually agreed upon pain goal       Mutually agreed upon pain goal: No pain with daily activity      •  PT cervical goals       Short Term Goals Time Frame Result Comment   Cervical extension and side bending pain free during testing, showing reduction of obstruction 4 weeks     NDI </= 16% 4 weeks     PSFS >/= 66% 4 weeks     Pt will be independent with basic HEP for initial reduction of symptoms 4 weeks     Pt will complete daily housework with minimal difficulty and no increase in symptoms  4 weeks          Long Term Goals Time Frame Result Comment   NDI </= 10% 8 weeks     PSFS >/= 80% 8 weeks     Pt will be independent with advanced HEP  for maintenance 8 weeks     Pt will have centralization of UE symptoms by >/= 90% 8 weeks     Pt will write, text, and type daily with minimal to no difficulty 8 weeks     Pt will return to a weight lifting program 2-3 days per week without increased symptoms 8 weeks                       TREATMENT PLAN:    ORTHO PT FLOWSHEET    Exercises Current Session Time Completed (Y/N/G)   MODALITIES- HEAT/ICE (35949) TOTAL TIME FOR SESSION Not performed    Heat     Ice     MODALITIES - E-STIM (03882) TOTAL TIME FOR SESSION Not performed    E-stim/H-Wave     THER ACT (50765) TOTAL TIME FOR SESSION 8-22 Minutes    Assessment      Vitals/pain See note Y   Education 3/18/24: purpose of mechanical exam, results of exam, further need to investigate results of movements with HEP, plan of care with recommended frequency - patient verbalizes agreement and understanding Y   Body Mechanics/Work Training     GROUP (35232)  Not performed    THER EX (16848) TOTAL TIME FOR SESSION 0-7 Minutes    HEP 3/18/24: cervical retraction, retraction/extension in sitting (printed) Y   CARDIOVASCULAR      UBE     STRENGTHENING     Prone I     Prone shoulder ER     Prone T     Snow haley on half foam     No money     Wall slide with lift off     Lat pull down          STRETCHING/ROM     Thoracic extension in sitting     Cervical retraction with self overpressure 2 x 10 Y   Cervical retraction/extension in sitting 1 x 8 Y   Neural glides     Doorway stretch          NEURO RE-ED (10415) TOTAL TIME FOR SESSION Not performed    Craniocervical flexion test               Taping     MANUAL (64473) TOTAL TIME FOR SESSION 0-7 Minutes    Retraction with clinician overpressure      Supine cervical retraction/ext with traction 1 x 5 Y   Thoracic mobilization     Y = yes; N = no; G = group; NV = next visit; H = hold        ASSESSMENT:    This 33 y.o. year old female presents to PT with above stated diagnosis. Physical Therapy evaluation reveals decreased ROM,  decreased strength, impaired motor control, impaired sensation, pain resulting in home management, work, community/leisure limitations. Sayda Thomason will benefit from skilled PT services to address limitation, work towards rehab and patient goals and maximize PLOF of chosen ADLs.     Planned Services: The patient's treatment will include CPT 09209 Therapeutic exercises, CPT 43913 Therapeutic activities, CPT 81149 Neuromuscular Reeducation, CPT 37536 Manual therapy, CPT 36034 Hot/Cold Packs therapy, CPT 83676 Electrical stimulation UNATTENDED, .     Nasim Muhammad, PT

## 2024-04-11 ENCOUNTER — HOSPITAL ENCOUNTER (OUTPATIENT)
Dept: PHYSICAL THERAPY | Age: 34
Setting detail: THERAPIES SERIES
Discharge: HOME | End: 2024-04-11
Attending: FAMILY MEDICINE
Payer: COMMERCIAL

## 2024-04-11 DIAGNOSIS — M50.20 HERNIATED DISC, CERVICAL: ICD-10-CM

## 2024-04-11 DIAGNOSIS — M54.50 LUMBAR PAIN: Primary | ICD-10-CM

## 2024-04-11 PROCEDURE — 97530 THERAPEUTIC ACTIVITIES: CPT | Mod: GP

## 2024-04-11 PROCEDURE — 97110 THERAPEUTIC EXERCISES: CPT | Mod: GP

## 2024-04-11 NOTE — OP PT TREATMENT LOG
ORTHO PT FLOWSHEET    Exercises Current Session Time Completed (Y/N/G)   MODALITIES- HEAT/ICE (77914) TOTAL TIME FOR SESSION Not performed    Heat     Ice     MODALITIES - E-STIM (02557) TOTAL TIME FOR SESSION Not performed    E-stim/H-Wave     THER ACT (62400) TOTAL TIME FOR SESSION 23-37 Minutes    Assessment  See re-eval for lumbar spine Y   Vitals/pain See note Y   Education 3/18/24: purpose of mechanical exam, results of exam, further need to investigate results of movements with HEP, plan of care with recommended frequency - patient verbalizes agreement and understanding  4/11/24: results of exam focusing on lumbar spine, justification and goal of new exercise, possibility that mechanical response will precede significant symptomatic response - patient verbalizes agreement and understanding Y   Body Mechanics/Work Training     GROUP (56777)  Not performed    THER EX (77462) TOTAL TIME FOR SESSION 23-37 Minutes    HEP 4/11/24: cervical retraction/extension, thoracic extension, lumbar extension in prone with hips offset to L (printed) Y   CARDIOVASCULAR      UBE     STRENGTHENING     Prone I     Prone shoulder ER     Prone T     Snow haley on half foam     No money     Wall slide with lift off     Lat pull down          STRETCHING/ROM     Thoracic extension in sitting 2 x 10 using foam roller with feet on 8 inch block Y   Cervical retraction with self overpressure 1 x 10 Y   Cervical retraction/extension in sitting 1 x 8 Y   Lumbar extension in prone 2 x 10 with lock, blow, and sag Y   Lumbar ext in prone with hips offset to left  2 x 10 with manual lateral overpressure Y   Neural glides     Doorway stretch          NEURO RE-ED (01290) TOTAL TIME FOR SESSION Not performed    Craniocervical flexion test               Taping     MANUAL (39639) TOTAL TIME FOR SESSION 0-7 Minutes    Retraction with clinician overpressure      Supine cervical retraction/ext with traction 1 x 6 with end range rotation Y   Thoracic  mobilization     Repeated lumbar extension in prone with clinician overpressure 1 x 10 (OP to L5) Y   Y = yes; N = no; G = group; NV = next visit; H = hold

## 2024-04-11 NOTE — PROGRESS NOTES
Physical Therapy Progress Note    [facility] Fax: [phone]    PT RE-EVALUATION FOR OUTPATIENT THERAPY    Patient: Sayda Thomason     MRN: [id number]  : 1990 33 y.o.    Referring Physician: Uriah Walsh DO  Date of Visit: 2024    New Certification Dates: 24 through 05/15/24    Recommended Frequency & Duration:  2 times/week for up to 8 weeks        Diagnosis:   1. Lumbar pain    2. Herniated disc, cervical        Chief Complaints:  No chief complaint on file.      Precautions:   Existing Precautions/Restrictions: no known precautions/restrictions    TODAY'S VISIT:    Time In Session:  Start Time: 1102  Stop Time: 1159  Time Calculation (min): 57 min   General Information - 24 1102          Session Details    Document Type re-evaluation     Mode of Treatment individual therapy        General Information    Referring Physician Uriah Walsh DO     History of present illness/functional impairment Sayda Thomason started experiencing leg numbness while running on the treadmill at the end of 2023. The numbness progressed to include bilateral hands, and those symptoms have persisted. She reports bilateral arm weakness and loss of fine motor skills. Leg numbness subsided for a while, but that has returned as well as foot weakness. Originally, the patient noted some neck pain and right scapular pain. Neck pain still comes and goes intermittently, but it is relatively mild compared to arm symptoms. Looking upward or to the side makes the neck feel tight. The patient had been going to [employer], but she transitioned to a lower level independent program. She would like to improve her upper extremity coordination and return to her previous level of exercise.     Patient/Family/Caregiver Comments/Observations The patient reports some compliance to the home exercise for the cervical spine. She currently feels moderate neck pain and right lateral leg numbness.     Existing  Precautions/Restrictions no known precautions/restrictions                      Pain/Vitals - 04/11/24 1106          Pain Assessment    Currently in pain Yes     Preferred Pain Scale number (Numeric Rating Pain Scale)     Pain Side/Orientation right     Pain: Body location Neck;Back     Pain Rating (0-10): Pre Activity 5     Pain Rating (0-10): Post Activity 5     Pain Radiation to leg, right   numbness    Pain Description --        Pre Activity Vital Signs    Pulse 79     /82     BP Location Left upper arm     BP Method Automatic     Patient Position Sitting        Pain Intervention    Intervention  manual, exercise     Post Intervention Comments minimal change in right leg symptoms or neck symptoms, increased pain-free lumbar movement                    PT - 04/11/24 1102          Physical Therapy    Physical Therapy Specialty Spine PT        PT Plan    Frequency of treatment 2 times/week     PT Duration 8 weeks     PT Cert From 03/18/24     PT Cert To 05/15/24     Date PT POC was sent to provider 03/18/24     Signed PT Plan of Care received?  Yes   updated 4/11/24                   Assessment and Plan - 04/11/24 1106          Assessment    Plan of Care reviewed and patient/family in agreement Yes     System Pathology/Pathophysiology Noted musculoskeletal;neuromuscular     Functional Limitations in Following Categories (PT Eval) home management;work;community/leisure     Rehab Potential/Prognosis good, to achieve stated therapy goals     Problem List decreased ROM;decreased strength;impaired motor control;impaired sensation;pain     Clinical Assessment Sayda Thomason is a 33 year-old female referred to outpatient physical therapy with a diagnosis of lumbar pain. She returns for the second visit following the evaluation for cervical disc herniation. Chief complaints include intermittent lower back pain and relatively constant right lateral leg numbness. The patient presents with limited lumbar range of motion in  the sagittal and frontal planes, hypomobility of the lower lumbar spine, and weakness of the right lower extremity. The SCHUYLER questionnaire score shows moderate functional limitation. Numbness in the right lower extremity is minimally reduced during the mechanical exam, but comfortable trunk range of motion improves in the frontal plane. Ms. Thomason will continue to benefit from physical therapy to address noted impairments, improve overall function, and return to desired recreation.     Plan and Recommendations Continue physical therapy intervention to improve quality of life.     Planned Services CPT 18860 Therapeutic exercises;CPT 35879 Therapeutic activities;CPT 52434 Neuromuscular Reeducation;CPT 38856 Manual therapy;CPT 15075 Hot/Cold Packs therapy;CPT 11800 Electrical stimulation UNATTENDED                     OBJECTIVE MEASUREMENTS/DATA:     Myotomes and Dermatomes    Myotomes/Dermatomes - 04/11/24 1100          Myotome/Dermatome    Myotome/Dermatome Testing Lumbar Myotome Testing (Group)        Lumbar Myotome Testing    Left L2 Myotome: Hip Flexors intact     Right L2 Myotome: Hip Flexors impaired     Left L3 Myotome: Knee Extensors intact     Right L3 Myotome: Knee Extensors intact     Left L4 Myotome: Ankle Dorsiflexors and Invertors intact     Right L4 Myotome: Ankle Dorsiflexors and Invertors intact     Left L5 Myotome: Toe Extensors intact     Right L5 Myotome: Toe Extensors intact     Left S1 Myotome: Foot Evertors intact     Right S1 Myotome: Foot Evertors intact     Left S2 Myotome: Ankle Plantarflexors intact     Right S2 Myotome: Ankle Plantarflexors intact     Left S1 & S2 Myotome: Knee Flexors intact     Right S1 & S2 Myotome: Knee Flexors intact        Dermatome Testing    Left L2 Dermatome intact light touch sensation     Right L2 Dermatome intact light touch sensation     Left L3 Dermatome intact light touch sensation     Right L3 Dermatome intact light touch sensation     Left L4 Dermatome  intact light touch sensation     Right L4 Dermatome intact light touch sensation     Left L5 Dermatome intact light touch sensation     Right L5 Dermatome intact light touch sensation     Left S1 Dermatome intact light touch sensation     Right S1 Dermatome intact light touch sensation     Left S2 Dermatome intact light touch sensation     Right S2 Dermatome intact light touch sensation                   ROM    Range of Motion - 04/11/24 1100          Lumbar AROM    Lumbar Flexion 100     Lumbar Extension 80     Lumbar Left      Lumbar Right SB 90   produced R low back pain       Additional ROM Measurements    Additional ROM  SLR = 0-100 B                   MMT    Manual Muscle Tests - 04/11/24 1106          RIGHT: Lower Extremity Manual Muscle Test Assessment    Hip Flexion gross movement (4+/5) good plus     Hip Extension gross movement (4+/5) good plus     Hip Abduction gross movement (4/5) good     Hip Adduction gross movement (4-/5) good minus     Hip Internal Rotation gross movement (4-/5) good minus     Hip External Rotation gross movement (4-/5) good minus     Knee Flexion strength (5/5) normal     Knee Extension strength (5/5) normal        LEFT: Lower Extremity Manual Muscle Test Assessment    Hip Flexion gross movement (5/5) normal     Hip Extension gross movement (4+/5) good plus     Hip Abduction gross movement (4+/5) good plus     Hip Adduction gross movement (4+/5) good plus     Hip Internal Rotation gross movement (4+/5) good plus     Hip External Rotation gross movement (4+/5) good plus     Knee Flexion strength (5/5) normal     Knee Extension strength (5/5) normal                   Palpation    Palpation - 04/11/24 1106          Palpation    Back Palpation  stiffness and tenderness of L5 with PA pressure                   Ortho Special Tests    Orthopedic Special Tests - 04/11/24 1100          Neurodynamic     Slump Test Right - Negative;Left - Negative     SLR Right - Negative;Left -  Negative        Claire (MDT) Tests    REIL no effect on distal symptoms     Lateral Component prone press up with hips offset to left - mild reduction of LE symptoms, improved lateral ROM                   Gait and Mobility    Gait and Mobility - 04/11/24 1106          Gait Training    [place], Gait independent     Variable surfaces Flat surface     Assistive Device none     Comment (Gait/Stairs) No deviations                   Outcome Measures    PT Outcome Measures - 04/11/24 1106          Subjective Outcome Measures    Oswestry (SCHUYLER) 13/50 (26% limitation)                     ROM and MMT          3/18/2024 4/11/2024   PT Cervical/Lumbar/Other ROM Measurements   AROM: Cervical Flexion 55    AROM: Cervical Extension 55       tightness CT junction    AROM: Left Cervical SB 35       tightness on left side    AROM: Right Cervical SB 40    AROM: Left Cervical Rotation 68    AROM: Right Cervical Rotation 68    AROM: Cervical Comments cervical retraction minimal to no loss    AROM: Lumbar Flexion  100   AROM: Lumar Extension  80   AROM: Left Lumbar SB  100   AROM: Right Lumbar SB  90       produced R low back pain   Additional ROM  thoracic extension minimal to no loss    Additional ROM  thoracic rotation no loss B    Additional ROM   SLR = 0-100 B   PT UE MMT Measurements   Right Hand  (lbs) 74 pounds    Left Hand  (lbs) 72 pounds    PT LE MMT   Right Hip Flexion  (4+/5) good plus   Left Hip Flexion  (5/5) normal   Right Hip Extension  (4+/5) good plus   Left Hip Extension  (4+/5) good plus   Right Hip ABD  (4/5) good   Left Hip ABD  (4+/5) good plus   Right Hip ADD  (4-/5) good minus   Left Hip ADD  (4+/5) good plus   Right Hip IR  (4-/5) good minus   Left Hip IR  (4+/5) good plus   Right Hip ER  (4-/5) good minus   Left Hip ER  (4+/5) good plus   Right Knee Flexion  (5/5) normal   Left Knee Flexion  (5/5) normal   Right Knee Extension  (5/5) normal   Left Knee Extension  (5/5) normal     Outcome  Measures          3/18/2024    20:00 4/11/2024    11:06   PT SUBJECTIVE Outcome Measures   PSFS % Score 46 %    NDI 11/50 (22% limitation)    Oswestry  13/50 (26% limitation)       Today's Treatment::    ORTHO PT FLOWSHEET    Exercises Current Session Time Completed (Y/N/G)   MODALITIES- HEAT/ICE (17481) TOTAL TIME FOR SESSION Not performed    Heat     Ice     MODALITIES - E-STIM (53889) TOTAL TIME FOR SESSION Not performed    E-stim/H-Wave     THER ACT (19380) TOTAL TIME FOR SESSION 23-37 Minutes    Assessment  See re-eval for lumbar spine Y   Vitals/pain See note Y   Education 3/18/24: purpose of mechanical exam, results of exam, further need to investigate results of movements with HEP, plan of care with recommended frequency - patient verbalizes agreement and understanding  4/11/24: results of exam focusing on lumbar spine, justification and goal of new exercise, possibility that mechanical response will precede significant symptomatic response - patient verbalizes agreement and understanding Y   Body Mechanics/Work Training     GROUP (15583)  Not performed    THER EX (63858) TOTAL TIME FOR SESSION 23-37 Minutes    HEP 4/11/24: cervical retraction/extension, thoracic extension, lumbar extension in prone with hips offset to L (printed) Y   CARDIOVASCULAR      UBE     STRENGTHENING     Prone I     Prone shoulder ER     Prone T     Snow haley on half foam     No money     Wall slide with lift off     Lat pull down          STRETCHING/ROM     Thoracic extension in sitting 2 x 10 using foam roller with feet on 8 inch block Y   Cervical retraction with self overpressure 1 x 10 Y   Cervical retraction/extension in sitting 1 x 8 Y   Lumbar extension in prone 2 x 10 with lock, blow, and sag Y   Lumbar ext in prone with hips offset to left  2 x 10 with manual lateral overpressure Y   Neural glides     Doorway stretch          NEURO RE-ED (35546) TOTAL TIME FOR SESSION Not performed    Craniocervical flexion test                Taping     MANUAL (07924) TOTAL TIME FOR SESSION 0-7 Minutes    Retraction with clinician overpressure      Supine cervical retraction/ext with traction 1 x 6 with end range rotation Y   Thoracic mobilization     Repeated lumbar extension in prone with clinician overpressure 1 x 10 (OP to L5) Y   Y = yes; N = no; G = group; NV = next visit; H = hold      Goals:   Goals        PT cervical/lumbar goals      Short Term Goals Time Frame Result Comment   Cervical extension and side bending pain free during testing, showing reduction of obstruction 4 weeks     SCHUYLER </= 18% 4 weeks     NDI </= 16% 4 weeks     PSFS >/= 66% 4 weeks     Pt will be independent with basic HEP for initial reduction of symptoms 4 weeks     Pt will show signs of centralization of LE symptoms (~50% reduction) 4 weeks     Pt will complete daily housework with minimal difficulty and no increase in symptoms  4 weeks          Long Term Goals Time Frame Result Comment   SCHUYLER </= 12% 8 weeks     NDI </= 10% 8 weeks     PSFS >/= 80% 8 weeks     Pt will be independent with advanced HEP for maintenance 8 weeks     Pt will have centralization of UE /LE symptoms by >/= 90% 8 weeks     Pt will write, text, and type daily with minimal to no difficulty 8 weeks     Pt will return to a weight lifting program 2-3 days per week without increased symptoms 8 weeks     Pt will walk desired community distances without limit due to back or leg pain 8 weeks                         This 33 y.o. year old female presents to PT with above stated diagnosis. Physical Therapy evaluation reveals decreased ROM, decreased strength, impaired motor control, impaired sensation, pain resulting in home management, work, community/leisure limitations. Sayda Thomason will benefit from skilled PT services to address limitation, work towards rehab and patient goals and maximize PLOF of chosen ADLs.     Planned Services: The patient's treatment will include CPT 67849 Therapeutic exercises,  CPT 33432 Therapeutic activities, CPT 77311 Neuromuscular Reeducation, CPT 32297 Manual therapy, CPT 45498 Hot/Cold Packs therapy, CPT 95674 Electrical stimulation UNATTENDED, .     Nasim Muhammad, PT

## 2024-04-16 ENCOUNTER — HOSPITAL ENCOUNTER (OUTPATIENT)
Dept: PHYSICAL THERAPY | Age: 34
Setting detail: THERAPIES SERIES
Discharge: HOME | End: 2024-04-16
Attending: FAMILY MEDICINE
Payer: COMMERCIAL

## 2024-04-16 DIAGNOSIS — M50.20 HERNIATED DISC, CERVICAL: ICD-10-CM

## 2024-04-16 DIAGNOSIS — M54.50 LUMBAR PAIN: Primary | ICD-10-CM

## 2024-04-16 PROCEDURE — 97140 MANUAL THERAPY 1/> REGIONS: CPT | Mod: GP

## 2024-04-16 PROCEDURE — 97110 THERAPEUTIC EXERCISES: CPT | Mod: GP

## 2024-04-16 NOTE — OP PT TREATMENT LOG
ORTHO PT FLOWSHEET    Exercises Current Session Time Completed (Y/N/G)   MODALITIES- HEAT/ICE (79026) TOTAL TIME FOR SESSION Not performed    Heat     Ice     MODALITIES - E-STIM (78435) TOTAL TIME FOR SESSION Not performed    E-stim/H-Wave     THER ACT (61740) TOTAL TIME FOR SESSION 0-7 Minutes    Assessment  See re-eval for lumbar spine    Vitals/pain See note Y   Education 3/18/24: purpose of mechanical exam, results of exam, further need to investigate results of movements with HEP, plan of care with recommended frequency - patient verbalizes agreement and understanding  4/11/24: results of exam focusing on lumbar spine, justification and goal of new exercise, possibility that mechanical response will precede significant symptomatic response - patient verbalizes agreement and understanding Y   Body Mechanics/Work Training     GROUP (66870)  Not performed    THER EX (85707) TOTAL TIME FOR SESSION 8-22 Minutes    HEP 4/11/24: cervical retraction/extension, thoracic extension, lumbar extension in prone with hips offset to L (printed)  4/16/24: suggestion to hook feet in prone to maintain lateral shift during press up, adding end range rotation to cervical extension Y   CARDIOVASCULAR      UBE     STRENGTHENING     Prone I     Prone shoulder ER     Prone T     Snow haley on half foam     No money     Wall slide with lift off     Lat pull down          STRETCHING/ROM     Thoracic extension in sitting 2 x 10 using foam roller with feet on 8 inch block Y   Cervical retraction with self overpressure 1 x 10    Cervical retraction/extension in sitting 1 x 10 with end range rotation last 3 reps Y   Lumbar extension in prone 2 x 10 with lock, blow, and sag    Lumbar ext in prone with hips offset to left  2 x 10, hooking feet over side of mat to maintain lateral shift Y   Neural glides Trial LE tibial and fibular neural glide in sitting and supine (no difference from unaffected side) Y   Doorway stretch          NEURO RE-ED  (66549) TOTAL TIME FOR SESSION Not performed    Craniocervical flexion test               Taping     MANUAL (49123) TOTAL TIME FOR SESSION 8-22 Minutes    Retraction with clinician overpressure      Supine cervical retraction/ext with traction 1 x 6 with end range rotation    Thoracic mobilization     Repeated lumbar extension in prone with clinician overpressure 1 x 10 (OP to L5)    L5-S1 right UPA Grade III-IV x 2 bouts in prone (mild reduction in R LE) Y   Trial lumbar rotation mobilization in flexion 3 x 30 sec with knees to R Y   R piriformis release in prone 3 minutes with hip IR Y   Y = yes; N = no; G = group; NV = next visit; H = hold

## 2024-04-16 NOTE — PROGRESS NOTES
Physical Therapy Visit    PT DAILY NOTE FOR OUTPATIENT THERAPY    Patient: Sayda Thomason MRN: [id number]  : 1990 33 y.o.  Referring Physician: Uriah Walsh DO  Date of Visit: 2024    Certification Dates: 24 through 05/15/24    Diagnosis:   1. Lumbar pain    2. Herniated disc, cervical        Chief Complaints:  back pain, leg numbness, neck pain, arm symptoms, weakness    Precautions:   Existing Precautions/Restrictions: no known precautions/restrictions      TODAY'S VISIT    Time In Session:  Start Time: 1108 (slightly late arrival)  Stop Time: 1143 (patient needs to leave early)  Time Calculation (min): 35 min   History/Vitals/Pain/Encounter Info - 24 1107          Injury History/Precautions/Daily Required Info    Document Type daily treatment     Primary Therapist Alberto     Chief Complaint/Reason for Visit  back pain, leg numbness, neck pain, arm symptoms, weakness     Referring Physician Uriah Walsh DO     Existing Precautions/Restrictions no known precautions/restrictions     History of present illness/functional impairment Sayda Thomason started experiencing leg numbness while running on the treadmill at the end of 2023. The numbness progressed to include bilateral hands, and those symptoms have persisted. She reports bilateral arm weakness and loss of fine motor skills. Leg numbness subsided for a while, but that has returned as well as foot weakness. Originally, the patient noted some neck pain and right scapular pain. Neck pain still comes and goes intermittently, but it is relatively mild compared to arm symptoms. Looking upward or to the side makes the neck feel tight. The patient had been going to [employer], but she transitioned to a lower level independent program. She would like to improve her upper extremity coordination and return to her previous level of exercise.     Patient/Family/Caregiver Comments/Observations The patient reports compliance to the home  exercises. She notices less frequency of arm symptoms. The lower back is moderately painful today.     Patient reported fall since last visit No        Pain Assessment    Currently in pain Yes     Preferred Pain Scale number (Numeric Rating Pain Scale)     Pain: Body location Back     Pain Rating (0-10): Pre Activity 5     Pain Radiation to leg, right   numbness       Pain Intervention    Intervention  manual, exercise     Post Intervention Comments minimal change in right leg symptoms, back pain intermittent        Pre Activity Vital Signs    Pulse 80     /88     BP Location Left upper arm     BP Method Automatic     Patient Position Sitting                    Daily Treatment Assessment and Plan - 04/16/24 1107          Daily Treatment Assessment and Plan    Progress toward goals Progressing     Daily Outcome Summary Right leg and foot symptoms do not reduce to a noticeable degree during the visit. Minimal improvement may occur following lumbar segmental mobilization, but it is difficult to note change in a short period. The patient has been noting some symptomatic change in the upper extremity with decreased frequency. The patient is advised to hook her feet at home to better maintain a lateral shift during prone press ups.     Plan and Recommendations Continue PT. Follow up on the result of the modified HEP.                         OBJECTIVE DATA TAKEN TODAY:    None taken    Today's Treatment:    ORTHO PT FLOWSHEET    Exercises Current Session Time Completed (Y/N/G)   MODALITIES- HEAT/ICE (82165) TOTAL TIME FOR SESSION Not performed    Heat     Ice     MODALITIES - E-STIM (68978) TOTAL TIME FOR SESSION Not performed    E-stim/H-Wave     THER ACT (17062) TOTAL TIME FOR SESSION 0-7 Minutes    Assessment  See re-eval for lumbar spine    Vitals/pain See note Y   Education 3/18/24: purpose of mechanical exam, results of exam, further need to investigate results of movements with HEP, plan of care with  recommended frequency - patient verbalizes agreement and understanding  4/11/24: results of exam focusing on lumbar spine, justification and goal of new exercise, possibility that mechanical response will precede significant symptomatic response - patient verbalizes agreement and understanding Y   Body Mechanics/Work Training     GROUP (70984)  Not performed    THER EX (23481) TOTAL TIME FOR SESSION 8-22 Minutes    HEP 4/11/24: cervical retraction/extension, thoracic extension, lumbar extension in prone with hips offset to L (printed)  4/16/24: suggestion to hook feet in prone to maintain lateral shift during press up, adding end range rotation to cervical extension Y   CARDIOVASCULAR      UBE     STRENGTHENING     Prone I     Prone shoulder ER     Prone T     Snow haley on half foam     No money     Wall slide with lift off     Lat pull down          STRETCHING/ROM     Thoracic extension in sitting 2 x 10 using foam roller with feet on 8 inch block Y   Cervical retraction with self overpressure 1 x 10    Cervical retraction/extension in sitting 1 x 10 with end range rotation last 3 reps Y   Lumbar extension in prone 2 x 10 with lock, blow, and sag    Lumbar ext in prone with hips offset to left  2 x 10, hooking feet over side of mat to maintain lateral shift Y   Neural glides Trial LE tibial and fibular neural glide in sitting and supine (no difference from unaffected side) Y   Doorway stretch          NEURO RE-ED (26520) TOTAL TIME FOR SESSION Not performed    Craniocervical flexion test               Taping     MANUAL (35135) TOTAL TIME FOR SESSION 8-22 Minutes    Retraction with clinician overpressure      Supine cervical retraction/ext with traction 1 x 6 with end range rotation    Thoracic mobilization     Repeated lumbar extension in prone with clinician overpressure 1 x 10 (OP to L5)    L5-S1 right UPA Grade III-IV x 2 bouts in prone (mild reduction in R LE) Y   Trial lumbar rotation mobilization in flexion  3 x 30 sec with knees to R Y   R piriformis release in prone 3 minutes with hip IR Y   Y = yes; N = no; G = group; NV = next visit; H = hold

## 2024-04-18 ENCOUNTER — HOSPITAL ENCOUNTER (OUTPATIENT)
Dept: PHYSICAL THERAPY | Age: 34
Setting detail: THERAPIES SERIES
Discharge: HOME | End: 2024-04-18
Attending: FAMILY MEDICINE
Payer: COMMERCIAL

## 2024-04-18 DIAGNOSIS — M50.20 HERNIATED DISC, CERVICAL: ICD-10-CM

## 2024-04-18 DIAGNOSIS — M54.50 LUMBAR PAIN: Primary | ICD-10-CM

## 2024-04-18 PROCEDURE — 97140 MANUAL THERAPY 1/> REGIONS: CPT | Mod: GP

## 2024-04-18 PROCEDURE — 97110 THERAPEUTIC EXERCISES: CPT | Mod: GP

## 2024-04-18 NOTE — PROGRESS NOTES
Physical Therapy Visit    PT DAILY NOTE FOR OUTPATIENT THERAPY    Patient: Sayda Thomason MRN: [id number]  : 1990 33 y.o.  Referring Physician: Uriah Walsh DO  Date of Visit: 2024    Certification Dates: 24 through 05/15/24    Diagnosis:   1. Lumbar pain    2. Herniated disc, cervical        Chief Complaints:  back pain, leg numbness, neck pain, arm symptoms, weakness    Precautions:   Existing Precautions/Restrictions: no known precautions/restrictions      TODAY'S VISIT    Time In Session:  Start Time: 959  Stop Time: 105  Time Calculation (min): 56 min   History/Vitals/Pain/Encounter Info - 2459          Injury History/Precautions/Daily Required Info    Document Type daily treatment     Primary Therapist Alberto     Chief Complaint/Reason for Visit  back pain, leg numbness, neck pain, arm symptoms, weakness     Referring Physician Uriah Walsh DO     Existing Precautions/Restrictions no known precautions/restrictions     History of present illness/functional impairment Sayda Thomason started experiencing leg numbness while running on the treadmill at the end of 2023. The numbness progressed to include bilateral hands, and those symptoms have persisted. She reports bilateral arm weakness and loss of fine motor skills. Leg numbness subsided for a while, but that has returned as well as foot weakness. Originally, the patient noted some neck pain and right scapular pain. Neck pain still comes and goes intermittently, but it is relatively mild compared to arm symptoms. Looking upward or to the side makes the neck feel tight. The patient had been going to [employer], but she transitioned to a lower level independent program. She would like to improve her upper extremity coordination and return to her previous level of exercise.     Patient/Family/Caregiver Comments/Observations The patient reports some improvement in coordination and strength in the hands. Lower extremity  symptoms are more persistent at this time. Sometimes the left foot is numb as well as the right.     Patient reported fall since last visit No        Pain Assessment    Currently in pain No/Denies   foot numbness only       Pre Activity Vital Signs    Pulse 62     /86     BP Location Left upper arm     BP Method Automatic     Patient Position Sitting                    Daily Treatment Assessment and Plan - 04/18/24 0959          Daily Treatment Assessment and Plan    Progress toward goals Progressing     Daily Outcome Summary Sagittal plane extension is performed today, partially secondary to the symptoms presenting bilaterally on occasion. The patient responds well to static extension in prone without a lateral shift. Some central neck pain is produced during prone I with weights, but other exercises are not provocative. Cues are needed for proper maintenance of a neutral spine during dead lifting.     Plan and Recommendations Continue sagittal plane extension for the lumbar and cervical spine.                         OBJECTIVE DATA TAKEN TODAY:    None taken    Today's Treatment:    ORTHO PT FLOWSHEET    Exercises Current Session Time Completed (Y/N/G)   MODALITIES- HEAT/ICE (85055) TOTAL TIME FOR SESSION Not performed    Heat     Ice     MODALITIES - E-STIM (13228) TOTAL TIME FOR SESSION Not performed    E-stim/H-Wave     THER ACT (80282) TOTAL TIME FOR SESSION 0-7 Minutes    Assessment  See re-eval for lumbar spine    Vitals/pain See note Y   Education 3/18/24: purpose of mechanical exam, results of exam, further need to investigate results of movements with HEP, plan of care with recommended frequency - patient verbalizes agreement and understanding  4/11/24: results of exam focusing on lumbar spine, justification and goal of new exercise, possibility that mechanical response will precede significant symptomatic response - patient verbalizes agreement and understanding    Body Mechanics/Work Training      GROUP (20711)  Not performed    THER EX (29387) TOTAL TIME FOR SESSION 38-52 Minutes    HEP 4/18/24: cervical retraction/extension, thoracic extension, lumbar extension in prone with end range hold, neural glide (printed) Y   CARDIOVASCULAR      UBE     STRENGTHENING     Prone I 2 x 10 with 2# weights Y   Prone shoulder ER 2 x 10 with cues for scapular tipping Y   Prone T     Quadruped row dog 8 x 10 sec B Y   Snow haley on half foam     No money     Wall slide with lift off 2 x 10 with orange TB tension Y   Lat pull down     Dead lift 5 reps with 10# bar and cues for cervical retraction and trunk neutral Y        STRETCHING/ROM     Thoracic extension in sitting 2 x 10 using foam roller with feet on 8 inch block    Cervical retraction with self overpressure 1 x 10    Cervical retraction/extension in sitting 1 x 10 with end range rotation last 3 reps Y   Trial cervical extension in prone 30 sec hold with self OP Y   Lumbar extension in prone 1 x 30 seconds in press up position Y   Static lumbar extension in prone 2 x 90 seconds @ 26 degrees on sectional mat table Y   Lumbar ext in prone with hips offset to left  2 x 10, hooking feet over side of mat to maintain lateral shift    Neural glides 1 x 15 B LE tibial neural glide in supine with stretch out strap Y   Doorway stretch          NEURO RE-ED (25327) TOTAL TIME FOR SESSION Not performed    Craniocervical flexion test               Taping     MANUAL (94353) TOTAL TIME FOR SESSION 8-22 Minutes    Retraction with clinician overpressure      Supine cervical retraction/ext with traction 1 x 6 with end range rotation    Thoracic extension in sitting with clinician overpressure 1 x 10 Y   Repeated lumbar extension in prone with clinician overpressure 1 x 10 (OP to L5)    L5-S1 right UPA Grade IV x 3 bouts in prone Y   Trial lumbar rotation mobilization in flexion 3 x 30 sec with knees to R    R piriformis release in prone 3 minutes Y   Y = yes; N = no; G = group; NV =  next visit; H = hold

## 2024-04-18 NOTE — OP PT TREATMENT LOG
ORTHO PT FLOWSHEET    Exercises Current Session Time Completed (Y/N/G)   MODALITIES- HEAT/ICE (29277) TOTAL TIME FOR SESSION Not performed    Heat     Ice     MODALITIES - E-STIM (78468) TOTAL TIME FOR SESSION Not performed    E-stim/H-Wave     THER ACT (93000) TOTAL TIME FOR SESSION 0-7 Minutes    Assessment  See re-eval for lumbar spine    Vitals/pain See note Y   Education 3/18/24: purpose of mechanical exam, results of exam, further need to investigate results of movements with HEP, plan of care with recommended frequency - patient verbalizes agreement and understanding  4/11/24: results of exam focusing on lumbar spine, justification and goal of new exercise, possibility that mechanical response will precede significant symptomatic response - patient verbalizes agreement and understanding    Body Mechanics/Work Training     GROUP (16609)  Not performed    THER EX (23705) TOTAL TIME FOR SESSION 38-52 Minutes    HEP 4/18/24: cervical retraction/extension, thoracic extension, lumbar extension in prone with end range hold, neural glide (printed) Y   CARDIOVASCULAR      UBE     STRENGTHENING     Prone I 2 x 10 with 2# weights Y   Prone shoulder ER 2 x 10 with cues for scapular tipping Y   Prone T     Quadruped row dog 8 x 10 sec B Y   Snow haley on half foam     No money     Wall slide with lift off 2 x 10 with orange TB tension Y   Lat pull down     Dead lift 5 reps with 10# bar and cues for cervical retraction and trunk neutral Y        STRETCHING/ROM     Thoracic extension in sitting 2 x 10 using foam roller with feet on 8 inch block    Cervical retraction with self overpressure 1 x 10    Cervical retraction/extension in sitting 1 x 10 with end range rotation last 3 reps Y   Trial cervical extension in prone 30 sec hold with self OP Y   Lumbar extension in prone 1 x 30 seconds in press up position Y   Static lumbar extension in prone 2 x 90 seconds @ 26 degrees on sectional mat table Y   Lumbar ext in prone with  hips offset to left  2 x 10, hooking feet over side of mat to maintain lateral shift    Neural glides 1 x 15 B LE tibial neural glide in supine with stretch out strap Y   Doorway stretch          NEURO RE-ED (32757) TOTAL TIME FOR SESSION Not performed    Craniocervical flexion test               Taping     MANUAL ([id number]) TOTAL TIME FOR SESSION 8-22 Minutes    Retraction with clinician overpressure      Supine cervical retraction/ext with traction 1 x 6 with end range rotation    Thoracic extension in sitting with clinician overpressure 1 x 10 Y   Repeated lumbar extension in prone with clinician overpressure 1 x 10 (OP to L5)    L5-S1 right UPA Grade IV x 3 bouts in prone Y   Trial lumbar rotation mobilization in flexion 3 x 30 sec with knees to R    R piriformis release in prone 3 minutes Y   Y = yes; N = no; G = group; NV = next visit; H = hold

## 2024-04-25 ENCOUNTER — HOSPITAL ENCOUNTER (OUTPATIENT)
Dept: PHYSICAL THERAPY | Age: 34
Setting detail: THERAPIES SERIES
Discharge: HOME | End: 2024-04-25
Attending: FAMILY MEDICINE
Payer: COMMERCIAL

## 2024-04-25 DIAGNOSIS — M50.20 HERNIATED DISC, CERVICAL: ICD-10-CM

## 2024-04-25 DIAGNOSIS — M54.50 LUMBAR PAIN: Primary | ICD-10-CM

## 2024-04-25 PROCEDURE — 97530 THERAPEUTIC ACTIVITIES: CPT | Mod: GP

## 2024-04-25 PROCEDURE — 97110 THERAPEUTIC EXERCISES: CPT | Mod: GP

## 2024-04-25 PROCEDURE — 97140 MANUAL THERAPY 1/> REGIONS: CPT | Mod: GP

## 2024-04-25 NOTE — OP PT TREATMENT LOG
ORTHO PT FLOWSHEET    Exercises Current Session Time Completed (Y/N/G)   MODALITIES- HEAT/ICE (63212) TOTAL TIME FOR SESSION Not performed    Heat     Ice     MODALITIES - E-STIM (72900) TOTAL TIME FOR SESSION Not performed    E-stim/H-Wave     THER ACT (35747) TOTAL TIME FOR SESSION 8-22 Minutes    Assessment  See progress note Y   Vitals/pain See note Y   Education 3/18/24: purpose of mechanical exam, results of exam, further need to investigate results of movements with HEP, plan of care with recommended frequency - patient verbalizes agreement and understanding  4/11/24: results of exam focusing on lumbar spine, justification and goal of new exercise, possibility that mechanical response will precede significant symptomatic response - patient verbalizes agreement and understanding  4/25/24: results of assessment, continued plan of care for neck and lower back - patient verbalizes agreement and understanding Y   Body Mechanics/Work Training     GROUP (57985)  Not performed    THER EX (29551) TOTAL TIME FOR SESSION 23-37 Minutes    HEP 4/25/24: cervical retraction/extension, thoracic extension, lumbar extension in standing, neural glide (printed) Y   CARDIOVASCULAR      UBE     STRENGTHENING     Prone I 2 x 10 with 2# weights NV   Prone shoulder ER 2 x 10 with cues for scapular tipping NV   Prone T     Quadruped row dog 8 x 10 sec B NV   Snow haley on half foam     No money     Wall slide with lift off 2 x 10 with orange TB tension NV   Lat pull down     Dead lift 5 reps with 10# bar and cues for cervical retraction and trunk neutral NV        STRETCHING/ROM     Thoracic extension in sitting 2 x 10 using foam roller with feet on 8 inch block Y   Cervical retraction with self overpressure 1 x 10    Cervical retraction/extension in sitting 1 x 10 with end range rotation last 3 reps Y   Trial cervical extension in prone 30 sec hold with self OP    Lumbar extension in prone 1 x 30 seconds in press up position Y    Static lumbar extension in prone 2 x 90 seconds @ 26 degrees on sectional mat table    Lumbar ext in standing 2 x 10 with mat support Y   Neural glides 1 x 15 B LE tibial neural glide in supine with stretch out strap NV   Trial lumbar flexion in supine 4 x 10 (increased back pain) Y        NEURO RE-ED (29767) TOTAL TIME FOR SESSION Not performed    Craniocervical flexion test               Taping     MANUAL (22174) TOTAL TIME FOR SESSION 8-22 Minutes    Retraction with clinician overpressure      Supine cervical retraction/ext with traction 1 x 6 with end range rotation Y   Thoracic extension in sitting with clinician overpressure 1 x 10    Repeated lumbar extension in prone with clinician overpressure 1 x 10 (OP to L5)    L5-S1 CPA Grade IV x 4 bouts in prone Y   Trial lumbar rotation mobilization in flexion 3 x 30 sec with knees to R    B piriformis release in prone 3 minutes each side Y   Y = yes; N = no; G = group; NV = next visit; H = hold

## 2024-04-25 NOTE — PROGRESS NOTES
Physical Therapy Progress Note    PT PROGRESS NOTE FOR OUTPATIENT THERAPY    Patient: Sayda Thomason   MRN: [id number]  : 1990 33 y.o.    Referring Physician: Uriah Walsh DO  Date of Visit: 2024    Certification Dates: 24 through 05/15/24    Recommended Frequency & Duration:  2 times/week for up to 8 weeks        Diagnosis:   1. Lumbar pain    2. Herniated disc, cervical        Chief Complaints:  Chief Complaint   Patient presents with    Dec ROM    Pain     Difficulty Performing Work/school Tasks    Decreased recreational/play activity       Precautions:   Existing Precautions/Restrictions: no known precautions/restrictions    TODAY'S VISIT:    Time In Session:  Start Time: 1002  Stop Time: 1102  Time Calculation (min): 60 min   General Information - 24 1000          Session Details    Document Type progress note     Mode of Treatment individual therapy        General Information    Referring Physician Uriah Walsh DO     History of present illness/functional impairment Sayda Thomason started experiencing leg numbness while running on the treadmill at the end of 2023. The numbness progressed to include bilateral hands, and those symptoms have persisted. She reports bilateral arm weakness and loss of fine motor skills. Leg numbness subsided for a while, but that has returned as well as foot weakness. Originally, the patient noted some neck pain and right scapular pain. Neck pain still comes and goes intermittently, but it is relatively mild compared to arm symptoms. Looking upward or to the side makes the neck feel tight. The patient had been going to [employer], but she transitioned to a lower level independent program. She would like to improve her upper extremity coordination and return to her previous level of exercise.     Patient/Family/Caregiver Comments/Observations The patient reports continued lower back pain when sitting for long periods, and the leg symptoms have  been about the same. Upper extremity symptoms have improved, however. Fine motor coordination is better as well.     Existing Precautions/Restrictions no known precautions/restrictions                    Daily Falls Screen - 04/25/24 1000          Daily Falls Assessment    Patient reported fall since last visit No                    Pain/Vitals - 04/25/24 1000          Pain Assessment    Currently in pain Yes     Preferred Pain Scale number (Numeric Rating Pain Scale)     Pain: Body location Back     Pain Rating (0-10): Pre Activity 5     Pain Rating (0-10): Post Activity --   not rated end of visit    Pain Radiation to leg, right;leg, left        Pain Intervention    Intervention  manual, exercise     Post Intervention Comments minor change in leg symptoms, pain not rated end of visit                    PT - 04/25/24 1000          Physical Therapy    Physical Therapy Specialty Spine PT        PT Plan    Frequency of treatment 2 times/week     PT Duration 8 weeks     PT Cert From 03/18/24     PT Cert To 05/15/24     Date PT POC was sent to provider 03/18/24     Signed PT Plan of Care received?  Yes                    Assessment and Plan - 04/25/24 1000          Assessment    Clinical Assessment Sayda Larkin completed five physical therapy visits for a cervical disc hernation and lumbar pain. She demonstrates increased right lower extremity strength, improved upper extremity coordiation and a significant increase in function on the PSFS questionnaire. Arm symptoms are reportedly improved since starting, but progress for the lower back and leg symptoms is slower. The patient responds to manual therapy targeting lower lumbar spine mobility and piriformis tightness with a mild reduction in distal symptoms. Sayda will continue to benefit from physical therapy to address remaining impairments, further reduce symptoms, and  improve overall functional level.     Plan and Recommendations Continue sagittal plane extension for the  lumbar and cervical spine. Follow up regarding the result of lumbar extension in a loaded position at home.                     OBJECTIVE MEASUREMENTS/DATA:    Myotomes and Dermatomes    Myotomes/Dermatomes - 04/25/24 1000          Cervical Myotome Testing    Left C1 Myotome: Neck Flexors intact     Right C1 Myotome: Neck Flexors intact     Left C2 Myotome: Neck Extensors intact     Right C2 Myotome: Neck Extensors intact     Left C3 Myotome: Neck Lateral Flexion intact     Right C3 Myotome: Neck Lateral Flexion intact     Left C4 Myotome: Shoulder Girdle Elevation intact     Right C4 Myotome: Shoulder Girdle Elevation intact     Left C5 Myotome: Shoulder Abductors intact     Right C5 Myotome: Shoulder Abductors intact     Left C6 Myotome: Elbow Flexors intact     Right C6 Myotome: Elbow Flexors intact     Left C7 Myotome: Elbow Extensors intact     Right C7 Myotome: Elbow Extensors intact     Left C8 Myotome: Finger Flexion intact     Right C8 Myotome: Finger Flexion intact     Left T1 Myotome: Finger Abductors intact     Right T1 Myotome: Finger Abductors intact        Lumbar Myotome Testing    Left L2 Myotome: Hip Flexors intact     Right L2 Myotome: Hip Flexors intact     Left L3 Myotome: Knee Extensors intact     Right L3 Myotome: Knee Extensors intact     Left L4 Myotome: Ankle Dorsiflexors and Invertors intact     Right L4 Myotome: Ankle Dorsiflexors and Invertors intact     Left L5 Myotome: Toe Extensors intact     Right L5 Myotome: Toe Extensors intact     Left S1 Myotome: Foot Evertors intact     Right S1 Myotome: Foot Evertors intact     Left S2 Myotome: Ankle Plantarflexors intact     Right S2 Myotome: Ankle Plantarflexors intact     Left S1 & S2 Myotome: Knee Flexors intact     Right S1 & S2 Myotome: Knee Flexors intact        Dermatome Testing    Left C2 Dermatome intact light touch sensation     Right C2 Dermatome intact light touch sensation     Left C3 Dermatome intact light touch sensation      Right C3 Dermatome intact light touch sensation     Left C4 Dermatome intact light touch sensation     Right C4 Dermatome intact light touch sensation     Left C5 Dermatome intact light touch sensation     Right C5 Dermatome intact light touch sensation     Left C6 Dermatome intact light touch sensation     Right C6 Dermatome intact light touch sensation     Left C7 Dermatome intact light touch sensation     Right C7 Dermatome intact light touch sensation     Left C8 Dermatome intact light touch sensation     Right C8 Dermatome intact light touch sensation     Left T1 Dermatome intact light touch sensation     Right T1 Dermatome intact light touch sensation     Left L2 Dermatome intact light touch sensation     Right L2 Dermatome intact light touch sensation     Left L3 Dermatome intact light touch sensation     Right L3 Dermatome intact light touch sensation     Left L4 Dermatome intact light touch sensation     Right L4 Dermatome intact light touch sensation     Left L5 Dermatome intact light touch sensation     Right L5 Dermatome intact light touch sensation     Left S1 Dermatome intact light touch sensation     Right S1 Dermatome intact light touch sensation     Left S2 Dermatome intact light touch sensation     Right S2 Dermatome intact light touch sensation                   ROM    Range of Motion - 04/25/24 1000          Cervical AROM    Cervical Flexion 55     Cervical Extension 55   stiffness CT junction    Cervical Left SB 35   left sided tightness    Cervical Right SB 40     Cervical Left Rotation 70     Cervical Right Rotation 70     Comments:  cervical retraction minimal loss        Lumbar AROM    Lumbar Flexion 100     Lumbar Extension 90   increased lower back    Lumbar Left      Lumbar Right SB 90   increased lower back       Additional ROM Measurements    Additional ROM  thoracic extension minimal to no loss     Additional ROM  thoracic rotation no loss B                   MMT    Manual Muscle  Tests - 04/25/24 1000          RIGHT: Lower Extremity Manual Muscle Test Assessment    Hip Flexion gross movement (4+/5) good plus     Hip Extension gross movement (4+/5) good plus     Hip Abduction gross movement (4+/5) good plus     Hip Adduction gross movement (4+/5) good plus     Hip Internal Rotation gross movement (4+/5) good plus     Hip External Rotation gross movement (4+/5) good plus     Knee Flexion strength (5/5) normal     Knee Extension strength (5/5) normal        LEFT: Lower Extremity Manual Muscle Test Assessment    Hip Flexion gross movement (4+/5) good plus     Hip Extension gross movement (4+/5) good plus     Hip Abduction gross movement (4+/5) good plus     Hip Adduction gross movement (4+/5) good plus     Hip Internal Rotation gross movement (4+/5) good plus     Hip External Rotation gross movement (4+/5) good plus     Knee Flexion strength (5/5) normal     Knee Extension strength (5/5) normal                   Palpation    Palpation - 04/25/24 1000          Manual Interventions    Manual technique #1 lumbar L5-S1 CPA     Manual technique #2 B piriformis release                   Ortho Special Tests    Orthopedic Special Tests - 04/25/24 1200          Neurodynamic     ULTT Median Right - Negative;Left - Positive     ULTT Radial Right - Negative;Left - Negative     ULTT Ulnar Right - Negative;Left - Positive     Slump Test Right - Negative;Left - Negative     SLR Right - Negative;Left - Negative                   Outcome Measures    PT Outcome Measures - 04/25/24 1000          Subjective Outcome Measures    NDI 10/50 (20% limitation)     Oswestry (SCHUYLER) 11/50 (22% limitation)        NEW (2/6/23):  PSFS     PSFS ACTIVITY 1 laundry     PSFS ANSWER 1 7.5     PSFS ACTIVITY 2 dishes     PSFS ANSWER 2 9     PSFS ACTIVITY 3 typing/writing     PSFS ANSWER 3 6     PSFS ACTIVITY 4 going to gym     PSFS ANSWER 4 5     PSFS ACTIVITY 5 texting     PSFS ANSWER 5 7     PSFS Sum of Activity Scores 34.5     PSFS  Number of Activities 5     PSFS Percent Score 69 %                     ROM and MMT          3/18/2024 4/11/2024 4/25/2024    10:00   PT Cervical/Lumbar/Other ROM Measurements   AROM: Cervical Flexion 55  55   AROM: Cervical Extension 55       tightness CT junction  55       stiffness CT junction   AROM: Left Cervical SB 35       tightness on left side  35       left sided tightness   AROM: Right Cervical SB 40  40   AROM: Left Cervical Rotation 68  70   AROM: Right Cervical Rotation 68  70   AROM: Cervical Comments cervical retraction minimal to no loss  cervical retraction minimal loss   AROM: Lumbar Flexion  100 100   AROM: Lumar Extension  80 90       increased lower back   AROM: Left Lumbar SB  100 100   AROM: Right Lumbar SB  90       produced R low back pain 90       increased lower back   Additional ROM  thoracic extension minimal to no loss  thoracic extension minimal to no loss   Additional ROM  thoracic rotation no loss B  thoracic rotation no loss B   Additional ROM   SLR = 0-100 B    PT UE MMT Measurements   Right Hand  (lbs) 74 pounds     Left Hand  (lbs) 72 pounds     PT LE MMT   Right Hip Flexion  (4+/5) good plus (4+/5) good plus   Left Hip Flexion  (5/5) normal (4+/5) good plus   Right Hip Extension  (4+/5) good plus (4+/5) good plus   Left Hip Extension  (4+/5) good plus (4+/5) good plus   Right Hip ABD  (4/5) good (4+/5) good plus   Left Hip ABD  (4+/5) good plus (4+/5) good plus   Right Hip ADD  (4-/5) good minus (4+/5) good plus   Left Hip ADD  (4+/5) good plus (4+/5) good plus   Right Hip IR  (4-/5) good minus (4+/5) good plus   Left Hip IR  (4+/5) good plus (4+/5) good plus   Right Hip ER  (4-/5) good minus (4+/5) good plus   Left Hip ER  (4+/5) good plus (4+/5) good plus   Right Knee Flexion  (5/5) normal (5/5) normal   Left Knee Flexion  (5/5) normal (5/5) normal   Right Knee Extension  (5/5) normal (5/5) normal   Left Knee Extension  (5/5) normal (5/5) normal     Outcome Measures           3/18/2024    20:00 4/11/2024    11:06 4/25/2024    10:00   PT SUBJECTIVE Outcome Measures   PSFS % Score 46 %  69 %   NDI 11/50 (22% limitation)  10/50 (20% limitation)   Oswestry  13/50 (26% limitation) 11/50 (22% limitation)       Today's Treatment::    Education provided:  Yes: See treatment log for details of education provided  Methods: Discussion, Handout, and Demonstration  Readiness: acceptance  Response: Demonstrated understanding and Verbalizes understanding    ORTHO PT FLOWSHEET    Exercises Current Session Time Completed (Y/N/G)   MODALITIES- HEAT/ICE (54452) TOTAL TIME FOR SESSION Not performed    Heat     Ice     MODALITIES - E-STIM (15886) TOTAL TIME FOR SESSION Not performed    E-stim/H-Wave     THER ACT (41309) TOTAL TIME FOR SESSION 8-22 Minutes    Assessment  See progress note Y   Vitals/pain See note Y   Education 3/18/24: purpose of mechanical exam, results of exam, further need to investigate results of movements with HEP, plan of care with recommended frequency - patient verbalizes agreement and understanding  4/11/24: results of exam focusing on lumbar spine, justification and goal of new exercise, possibility that mechanical response will precede significant symptomatic response - patient verbalizes agreement and understanding  4/25/24: results of assessment, continued plan of care for neck and lower back - patient verbalizes agreement and understanding Y   Body Mechanics/Work Training     GROUP (59362)  Not performed    THER EX (80444) TOTAL TIME FOR SESSION 23-37 Minutes    HEP 4/25/24: cervical retraction/extension, thoracic extension, lumbar extension in standing, neural glide (printed) Y   CARDIOVASCULAR      UBE     STRENGTHENING     Prone I 2 x 10 with 2# weights NV   Prone shoulder ER 2 x 10 with cues for scapular tipping NV   Prone T     Quadruped row dog 8 x 10 sec B NV   Snow haley on half foam     No money     Wall slide with lift off 2 x 10 with orange TB tension NV    Lat pull down     Dead lift 5 reps with 10# bar and cues for cervical retraction and trunk neutral NV        STRETCHING/ROM     Thoracic extension in sitting 2 x 10 using foam roller with feet on 8 inch block Y   Cervical retraction with self overpressure 1 x 10    Cervical retraction/extension in sitting 1 x 10 with end range rotation last 3 reps Y   Trial cervical extension in prone 30 sec hold with self OP    Lumbar extension in prone 1 x 30 seconds in press up position Y   Static lumbar extension in prone 2 x 90 seconds @ 26 degrees on sectional mat table    Lumbar ext in standing 2 x 10 with mat support Y   Neural glides 1 x 15 B LE tibial neural glide in supine with stretch out strap NV   Trial lumbar flexion in supine 4 x 10 (increased back pain) Y        NEURO RE-ED (66730) TOTAL TIME FOR SESSION Not performed    Craniocervical flexion test               Taping     MANUAL (85487) TOTAL TIME FOR SESSION 8-22 Minutes    Retraction with clinician overpressure      Supine cervical retraction/ext with traction 1 x 6 with end range rotation Y   Thoracic extension in sitting with clinician overpressure 1 x 10    Repeated lumbar extension in prone with clinician overpressure 1 x 10 (OP to L5)    L5-S1 CPA Grade IV x 4 bouts in prone Y   Trial lumbar rotation mobilization in flexion 3 x 30 sec with knees to R    B piriformis release in prone 3 minutes each side Y   Y = yes; N = no; G = group; NV = next visit; H = hold       Goals Addressed                   This Visit's Progress     PT cervical/lumbar goals        Short Term Goals Time Frame Result Comment   Cervical extension and side bending pain free during testing, showing reduction of obstruction 4 weeks     SCHUYLER </= 18% 4 weeks     NDI </= 16% 4 weeks     PSFS >/= 66% 4 weeks Met    Pt will be independent with basic HEP for initial reduction of symptoms 4 weeks Met    Pt will show signs of centralization of LE symptoms (~50% reduction) 4 weeks     Pt will  complete daily housework with minimal difficulty and no increase in symptoms  4 weeks          Long Term Goals Time Frame Result Comment   SCHUYLER </= 12% 8 weeks     NDI </= 10% 8 weeks     PSFS >/= 80% 8 weeks     Pt will be independent with advanced HEP for maintenance 8 weeks     Pt will have centralization of UE /LE symptoms by >/= 90% 8 weeks Improved    Pt will write, text, and type daily with minimal to no difficulty 8 weeks     Pt will return to a weight lifting program 2-3 days per week without increased symptoms 8 weeks     Pt will walk desired community distances without limit due to back or leg pain 8 weeks                       Nasim Muhammad, PT

## 2024-04-30 ENCOUNTER — HOSPITAL ENCOUNTER (OUTPATIENT)
Dept: PHYSICAL THERAPY | Age: 34
Setting detail: THERAPIES SERIES
Discharge: HOME | End: 2024-04-30
Attending: FAMILY MEDICINE
Payer: COMMERCIAL

## 2024-04-30 DIAGNOSIS — M54.50 LUMBAR PAIN: Primary | ICD-10-CM

## 2024-04-30 DIAGNOSIS — M50.20 HERNIATED DISC, CERVICAL: ICD-10-CM

## 2024-04-30 PROCEDURE — 97140 MANUAL THERAPY 1/> REGIONS: CPT | Mod: GP

## 2024-04-30 PROCEDURE — 97110 THERAPEUTIC EXERCISES: CPT | Mod: GP

## 2024-04-30 NOTE — OP PT TREATMENT LOG
ORTHO PT FLOWSHEET    Exercises Current Session Time Completed (Y/N/G)   MODALITIES- HEAT/ICE (67745) TOTAL TIME FOR SESSION Not performed    Heat     Ice     MODALITIES - E-STIM (10882) TOTAL TIME FOR SESSION Not performed    E-stim/H-Wave     THER ACT (02252) TOTAL TIME FOR SESSION 0-7 Minutes    Assessment  See progress note    Vitals/pain See note Y   Education 3/18/24: purpose of mechanical exam, results of exam, further need to investigate results of movements with HEP, plan of care with recommended frequency - patient verbalizes agreement and understanding  4/11/24: results of exam focusing on lumbar spine, justification and goal of new exercise, possibility that mechanical response will precede significant symptomatic response - patient verbalizes agreement and understanding  4/25/24: results of assessment, continued plan of care for neck and lower back - patient verbalizes agreement and understanding    Body Mechanics/Work Training     GROUP (01356)  Not performed    THER EX (88050) TOTAL TIME FOR SESSION 38-52 Minutes    HEP 4/30/24: cervical retraction/extension, thoracic extension, lumbar extension in standing, neural glide, superman (printed) Y   CARDIOVASCULAR      UBE     STRENGTHENING     Prone I 2 x 10 over Swiss ball with cues for maintenance of cervical retraction Y   Prone shoulder ER 2 x 10 over Swiss ball with cues for maintenance of cervical retraction Y   Prone T     Quadruped row dog 8 x 10 sec B    Snow haley on wall 2 x 10 (trial wall sit NV) Y   No money     Wall slide with lift off 2 x 10 with orange TB tension Y   Lat pull down     Dead lift 5 reps with 10# bar and cues for cervical retraction and trunk neutral    Triceps extension (review of form) 5# weight in hip hinge with cues for maintenance of cervical retraction  10# weight with both hands overhead in standing with cues for cervical retraction  Y    Y        STRETCHING/ROM     Thoracic extension in sitting 2 x 10 using foam  roller with feet on 8 inch block NV   Cervical retraction with self overpressure 1 x 10    Cervical retraction/extension in sitting 1 x 10 with end range rotation last 3 reps Y   Trial cervical extension in prone 30 sec hold with self OP    Lumbar extension in prone 1 x 30 seconds in press up position    Static lumbar extension in prone 2 x 90 seconds @ 26 degrees on sectional mat table    Lumbar ext in standing 2 x 10 with mat support Y   Neural glides 1 x 15 B LE tibial neural glide in supine with stretch out strap NV   Trial lumbar flexion in supine 4 x 10 (increased back pain) H        NEURO RE-ED (55088) TOTAL TIME FOR SESSION Not performed    Craniocervical flexion test               Taping     MANUAL (86577) TOTAL TIME FOR SESSION 8-22 Minutes    Retraction with clinician overpressure      Supine cervical retraction/ext with traction 1 x 6 with end range rotation Y   Thoracic extension in sitting with clinician overpressure 1 x 10    Repeated lumbar extension in prone with clinician overpressure 1 x 10 (OP to L5)    L5-S1 CPA Grade IV x 3 bouts in prone Y   Trial lumbar rotation mobilization in flexion 3 x 30 sec with knees to R    B piriformis release in prone 2 x 2-3 minutes each side (on different trigger points) Y   Y = yes; N = no; G = group; NV = next visit; H = hold

## 2024-04-30 NOTE — PROGRESS NOTES
Physical Therapy Visit    PT DAILY NOTE FOR OUTPATIENT THERAPY    Patient: Sayda Thomason MRN: [id number]  : 1990 33 y.o.  Referring Physician: Uriah Walsh DO  Date of Visit: 2024    Certification Dates: 24 through 05/15/24    Diagnosis:   1. Lumbar pain    2. Herniated disc, cervical        Chief Complaints:  back pain, leg numbness, neck pain, arm symptoms, weakness    Precautions:   Existing Precautions/Restrictions: no known precautions/restrictions      TODAY'S VISIT    Time In Session:  Start Time: 1106  Stop Time: 1201  Time Calculation (min): 55 min   History/Vitals/Pain/Encounter Info - 24 1106          Injury History/Precautions/Daily Required Info    Document Type daily treatment     Primary Therapist Alberto     Chief Complaint/Reason for Visit  back pain, leg numbness, neck pain, arm symptoms, weakness     Referring Physician Uriah Walsh DO     Existing Precautions/Restrictions no known precautions/restrictions     History of present illness/functional impairment Sayda Thomason started experiencing leg numbness while running on the treadmill at the end of 2023. The numbness progressed to include bilateral hands, and those symptoms have persisted. She reports bilateral arm weakness and loss of fine motor skills. Leg numbness subsided for a while, but that has returned as well as foot weakness. Originally, the patient noted some neck pain and right scapular pain. Neck pain still comes and goes intermittently, but it is relatively mild compared to arm symptoms. Looking upward or to the side makes the neck feel tight. The patient had been going to [employer], but she transitioned to a lower level independent program. She would like to improve her upper extremity coordination and return to her previous level of exercise.     Patient/Family/Caregiver Comments/Observations The patient felt some lower back relief for the first time after the last visit. Prolonged sitting  or walking will still increase pain.     Patient reported fall since last visit No        Pain Assessment    Currently in pain Yes     Preferred Pain Scale number (Numeric Rating Pain Scale)     Pain Side/Orientation right     Pain: Body location Back     Pain Rating (0-10): Pre Activity 4     Pain Rating (0-10): Post Activity 4     Pain Radiation to leg, left;leg, right        Pain Intervention    Intervention  manual, exercise     Post Intervention Comments decreased neck stiffness, decreased lower extremity symptoms                    Daily Treatment Assessment and Plan - 04/30/24 1106          Daily Treatment Assessment and Plan    Progress toward goals Progressing     Daily Outcome Summary The patient has been responding better to loaded lumbar extension and central posterior to anterior mobilization, noting reduction of central and peripheral symptoms. Piriformis release also continues to provide some relief. Some cues are required for maintenance of cervicla retraction, especially when the patient assumes in inclined position (on the Swiss ball or in a hip hinge).     Plan and Recommendations Continue loaded sagittal plane extension for the lumbar and cervical spine. Use manual therapy as needed.                         OBJECTIVE DATA TAKEN TODAY:    None taken    Today's Treatment:    ORTHO PT FLOWSHEET    Exercises Current Session Time Completed (Y/N/G)   MODALITIES- HEAT/ICE (85308) TOTAL TIME FOR SESSION Not performed    Heat     Ice     MODALITIES - E-STIM (82644) TOTAL TIME FOR SESSION Not performed    E-stim/H-Wave     THER ACT (80508) TOTAL TIME FOR SESSION 0-7 Minutes    Assessment  See progress note    Vitals/pain See note Y   Education 3/18/24: purpose of mechanical exam, results of exam, further need to investigate results of movements with HEP, plan of care with recommended frequency - patient verbalizes agreement and understanding  4/11/24: results of exam focusing on lumbar spine, justification  and goal of new exercise, possibility that mechanical response will precede significant symptomatic response - patient verbalizes agreement and understanding  4/25/24: results of assessment, continued plan of care for neck and lower back - patient verbalizes agreement and understanding    Body Mechanics/Work Training     GROUP (57644)  Not performed    THER EX (29707) TOTAL TIME FOR SESSION 38-52 Minutes    HEP 4/30/24: cervical retraction/extension, thoracic extension, lumbar extension in standing, neural glide, superman (printed) Y   CARDIOVASCULAR      UBE     STRENGTHENING     Prone I 2 x 10 over Swiss ball with cues for maintenance of cervical retraction Y   Prone shoulder ER 2 x 10 over Swiss ball with cues for maintenance of cervical retraction Y   Prone T     Quadruped row dog 8 x 10 sec B    Snow haley on wall 2 x 10 (trial wall sit NV) Y   No money     Wall slide with lift off 2 x 10 with orange TB tension Y   Lat pull down     Dead lift 5 reps with 10# bar and cues for cervical retraction and trunk neutral    Triceps extension (review of form) 5# weight in hip hinge with cues for maintenance of cervical retraction  10# weight with both hands overhead in standing with cues for cervical retraction  Y    Y        STRETCHING/ROM     Thoracic extension in sitting 2 x 10 using foam roller with feet on 8 inch block NV   Cervical retraction with self overpressure 1 x 10    Cervical retraction/extension in sitting 1 x 10 with end range rotation last 3 reps Y   Trial cervical extension in prone 30 sec hold with self OP    Lumbar extension in prone 1 x 30 seconds in press up position    Static lumbar extension in prone 2 x 90 seconds @ 26 degrees on sectional mat table    Lumbar ext in standing 2 x 10 with mat support Y   Neural glides 1 x 15 B LE tibial neural glide in supine with stretch out strap NV   Trial lumbar flexion in supine 4 x 10 (increased back pain) H        NEURO RE-ED (09817) TOTAL TIME FOR SESSION  Not performed    Craniocervical flexion test               Taping     MANUAL (70299) TOTAL TIME FOR SESSION 8-22 Minutes    Retraction with clinician overpressure      Supine cervical retraction/ext with traction 1 x 6 with end range rotation Y   Thoracic extension in sitting with clinician overpressure 1 x 10    Repeated lumbar extension in prone with clinician overpressure 1 x 10 (OP to L5)    L5-S1 CPA Grade IV x 3 bouts in prone Y   Trial lumbar rotation mobilization in flexion 3 x 30 sec with knees to R    B piriformis release in prone 2 x 2-3 minutes each side (on different trigger points) Y   Y = yes; N = no; G = group; NV = next visit; H = hold

## 2024-05-07 ENCOUNTER — HOSPITAL ENCOUNTER (OUTPATIENT)
Dept: PHYSICAL THERAPY | Age: 34
Setting detail: THERAPIES SERIES
Discharge: HOME | End: 2024-05-07
Attending: FAMILY MEDICINE
Payer: COMMERCIAL

## 2024-05-07 DIAGNOSIS — M50.20 HERNIATED DISC, CERVICAL: ICD-10-CM

## 2024-05-07 DIAGNOSIS — M54.50 LUMBAR PAIN: Primary | ICD-10-CM

## 2024-05-07 PROCEDURE — 97110 THERAPEUTIC EXERCISES: CPT | Mod: GP

## 2024-05-07 PROCEDURE — 97140 MANUAL THERAPY 1/> REGIONS: CPT | Mod: GP

## 2024-05-07 NOTE — PROGRESS NOTES
Physical Therapy Visit    PT DAILY NOTE FOR OUTPATIENT THERAPY    Patient: Sayda Thomason MRN: [id number]  : 1990 33 y.o.  Referring Physician: Uriah Walsh DO  Date of Visit: 2024    Certification Dates: 24 through 05/15/24    Diagnosis:   1. Lumbar pain    2. Herniated disc, cervical        Chief Complaints:  back pain, leg numbness, neck pain, arm symptoms, weakness    Precautions:   Existing Precautions/Restrictions: no known precautions/restrictions      TODAY'S VISIT    Time In Session:  Start Time: 1102  Stop Time: 1201  Time Calculation (min): 59 min   History/Vitals/Pain/Encounter Info - 24 1102          Injury History/Precautions/Daily Required Info    Document Type daily treatment     Primary Therapist Alberto     Chief Complaint/Reason for Visit  back pain, leg numbness, neck pain, arm symptoms, weakness     Referring Physician Uriah Walsh DO     Existing Precautions/Restrictions no known precautions/restrictions     History of present illness/functional impairment Sayda Thomason started experiencing leg numbness while running on the treadmill at the end of 2023. The numbness progressed to include bilateral hands, and those symptoms have persisted. She reports bilateral arm weakness and loss of fine motor skills. Leg numbness subsided for a while, but that has returned as well as foot weakness. Originally, the patient noted some neck pain and right scapular pain. Neck pain still comes and goes intermittently, but it is relatively mild compared to arm symptoms. Looking upward or to the side makes the neck feel tight. The patient had been going to [employer], but she transitioned to a lower level independent program. She would like to improve her upper extremity coordination and return to her previous level of exercise.     Patient/Family/Caregiver Comments/Observations The patient reports more biltaeral foot symptoms today following a mile walk this morning. She      Patient reported fall since last visit No        Pain Assessment    Currently in pain Yes     Preferred Pain Scale number (Numeric Rating Pain Scale)     Pain: Body location Back     Pain Rating (0-10): Pre Activity 4        Pain Intervention    Intervention  manual, exercise     Post Intervention Comments decreased neck stiffness, decreased back pain, decreased lower extremity symptoms (not rated)                    Daily Treatment Assessment and Plan - 05/07/24 1102          Daily Treatment Assessment and Plan    Progress toward goals Progressing     Daily Outcome Summary Unloaded lumbar extension is completed first with clinician overpressure, then patient applied overpressure using a stretch out strap. Abolition of right lower extremity symptoms and partial reduction of left lower extemity symptoms is noted with these techniques. Further reduction occurs following piriformis releases. The home exercises are modified to include self piriformis release and unloaded extension with external overpressure for improved maintenance.     Plan and Recommendations Continue either unloaded lumbar extension with overpressure or standing extension.                         OBJECTIVE DATA TAKEN TODAY:    None taken    Today's Treatment:    ORTHO PT FLOWSHEET    Exercises Current Session Time Completed (Y/N/G)   MODALITIES- HEAT/ICE (55484) TOTAL TIME FOR SESSION Not performed    Heat     Ice     MODALITIES - E-STIM (64101) TOTAL TIME FOR SESSION Not performed    E-stim/H-Wave     THER ACT (60248) TOTAL TIME FOR SESSION 0-7 Minutes    Assessment  See progress note    Vitals/pain See note Y   Education 3/18/24: purpose of mechanical exam, results of exam, further need to investigate results of movements with HEP, plan of care with recommended frequency - patient verbalizes agreement and understanding  4/11/24: results of exam focusing on lumbar spine, justification and goal of new exercise, possibility that mechanical response  will precede significant symptomatic response - patient verbalizes agreement and understanding  4/25/24: results of assessment, continued plan of care for neck and lower back - patient verbalizes agreement and understanding    Body Mechanics/Work Training     GROUP (46828)  Not performed    THER EX (76299) TOTAL TIME FOR SESSION 38-52 Minutes    HEP 5/7/24: cervical retraction/extension, thoracic extension, lumbar extension in standing or prone, neural glide, superman (printed) Y   CARDIOVASCULAR      UBE     STRENGTHENING     Prone I 2 x 10 over Swiss ball with cues for maintenance of cervical retraction NV   Prone shoulder ER 2 x 10 over Swiss ball with cues for maintenance of cervical retraction NV   Prone T     Quadruped row dog 8 x 10 sec B    Snow haley on wall 2 x 10 (trial wall sit NV) NV   No money     Wall slide with lift off 2 x 10 with orange TB tension    Lat pull down     Dead lift 5 reps with 10# bar and cues for cervical retraction and trunk neutral    Triceps extension (review of form) 5# weight in hip hinge with cues for maintenance of cervical retraction  10# weight with both hands overhead in standing with cues for cervical retraction         High plank with mountain climber 1 x 10 B with cues for reduced range of hip flexion Y   Side plank with thread the needle Patient demo Y   Dying bug with T-band wrap 1 x 10 B with pink band under knees, crossed to each hand Y        STRETCHING/ROM     Thoracic extension in sitting 2 x 10 using foam roller with feet on 8 inch block Y   Cervical retraction with self overpressure 1 x 10    Cervical retraction/extension in sitting 1 x 10 with end range rotation last 3 reps    Trial cervical extension in prone 30 sec hold with self OP    Lumbar extension in prone 2 x 10 with stretch out strap for OP Y   Static lumbar extension in prone 2 x 90 seconds @ 26 degrees on sectional mat table    Lumbar ext in standing 1 x 10 with hand support Y   Neural glides 2 x 10 B  LE tibial neural glide in supine with stretch out strap, tennis ball under piriformis Y   Trial lumbar flexion in supine 4 x 10 (increased back pain) H        NEURO RE-ED (77226) TOTAL TIME FOR SESSION Not performed    Craniocervical flexion test               Taping     MANUAL (12783) TOTAL TIME FOR SESSION 8-22 Minutes    Retraction with clinician overpressure      Supine cervical retraction/ext with traction 1 x 6 with end range rotation Y   Thoracic extension in sitting with clinician overpressure 1 x 10    Repeated lumbar extension in prone with clinician overpressure 1 x 10 (OP to L5) Y   L5-S1 CPA Grade IV x 2 bouts in prone Y   Trial lumbar rotation mobilization in flexion 3 x 30 sec with knees to R    B piriformis release in prone 3 on R, 1 on L x 2-3 minutes each area (on different trigger points) Y   Y = yes; N = no; G = group; NV = next visit; H = hold

## 2024-05-07 NOTE — OP PT TREATMENT LOG
ORTHO PT FLOWSHEET    Exercises Current Session Time Completed (Y/N/G)   MODALITIES- HEAT/ICE (32712) TOTAL TIME FOR SESSION Not performed    Heat     Ice     MODALITIES - E-STIM (06071) TOTAL TIME FOR SESSION Not performed    E-stim/H-Wave     THER ACT (08180) TOTAL TIME FOR SESSION 0-7 Minutes    Assessment  See progress note    Vitals/pain See note Y   Education 3/18/24: purpose of mechanical exam, results of exam, further need to investigate results of movements with HEP, plan of care with recommended frequency - patient verbalizes agreement and understanding  4/11/24: results of exam focusing on lumbar spine, justification and goal of new exercise, possibility that mechanical response will precede significant symptomatic response - patient verbalizes agreement and understanding  4/25/24: results of assessment, continued plan of care for neck and lower back - patient verbalizes agreement and understanding    Body Mechanics/Work Training     GROUP (15571)  Not performed    THER EX (07399) TOTAL TIME FOR SESSION 38-52 Minutes    HEP 5/7/24: cervical retraction/extension, thoracic extension, lumbar extension in standing or prone, neural glide, superman (printed) Y   CARDIOVASCULAR      UBE     STRENGTHENING     Prone I 2 x 10 over Swiss ball with cues for maintenance of cervical retraction NV   Prone shoulder ER 2 x 10 over Swiss ball with cues for maintenance of cervical retraction NV   Prone T     Quadruped row dog 8 x 10 sec B    Snow haley on wall 2 x 10 (trial wall sit NV) NV   No money     Wall slide with lift off 2 x 10 with orange TB tension    Lat pull down     Dead lift 5 reps with 10# bar and cues for cervical retraction and trunk neutral    Triceps extension (review of form) 5# weight in hip hinge with cues for maintenance of cervical retraction  10# weight with both hands overhead in standing with cues for cervical retraction         High plank with mountain climber 1 x 10 B with cues for reduced range  of hip flexion Y   Side plank with thread the needle Patient demo Y   Dying bug with T-band wrap 1 x 10 B with pink band under knees, crossed to each hand Y        STRETCHING/ROM     Thoracic extension in sitting 2 x 10 using foam roller with feet on 8 inch block Y   Cervical retraction with self overpressure 1 x 10    Cervical retraction/extension in sitting 1 x 10 with end range rotation last 3 reps    Trial cervical extension in prone 30 sec hold with self OP    Lumbar extension in prone 2 x 10 with stretch out strap for OP Y   Static lumbar extension in prone 2 x 90 seconds @ 26 degrees on sectional mat table    Lumbar ext in standing 1 x 10 with hand support Y   Neural glides 2 x 10 B LE tibial neural glide in supine with stretch out strap, tennis ball under piriformis Y   Trial lumbar flexion in supine 4 x 10 (increased back pain) H        NEURO RE-ED (10349) TOTAL TIME FOR SESSION Not performed    Craniocervical flexion test               Taping     MANUAL (77681) TOTAL TIME FOR SESSION 8-22 Minutes    Retraction with clinician overpressure      Supine cervical retraction/ext with traction 1 x 6 with end range rotation Y   Thoracic extension in sitting with clinician overpressure 1 x 10    Repeated lumbar extension in prone with clinician overpressure 1 x 10 (OP to L5) Y   L5-S1 CPA Grade IV x 2 bouts in prone Y   Trial lumbar rotation mobilization in flexion 3 x 30 sec with knees to R    B piriformis release in prone 3 on R, 1 on L x 2-3 minutes each area (on different trigger points) Y   Y = yes; N = no; G = group; NV = next visit; H = hold

## 2024-05-14 ENCOUNTER — HOSPITAL ENCOUNTER (OUTPATIENT)
Dept: PHYSICAL THERAPY | Age: 34
Setting detail: THERAPIES SERIES
Discharge: HOME | End: 2024-05-14
Attending: FAMILY MEDICINE
Payer: COMMERCIAL

## 2024-05-14 DIAGNOSIS — M54.50 LUMBAR PAIN: Primary | ICD-10-CM

## 2024-05-14 DIAGNOSIS — M50.20 HERNIATED DISC, CERVICAL: ICD-10-CM

## 2024-05-14 PROCEDURE — 97140 MANUAL THERAPY 1/> REGIONS: CPT | Mod: GP

## 2024-05-14 PROCEDURE — 97110 THERAPEUTIC EXERCISES: CPT | Mod: GP

## 2024-05-14 PROCEDURE — 97530 THERAPEUTIC ACTIVITIES: CPT | Mod: GP

## 2024-05-14 NOTE — PROGRESS NOTES
Physical Therapy Progress Note    [facility] Fax: [phone]    PT RE-EVALUATION FOR OUTPATIENT THERAPY    Patient: Sayda Thomason     MRN: [id number]  : 1990 33 y.o.    Referring Physician: Uriah Walsh DO  Date of Visit: 2024    New Certification Dates: 24 through 24    Recommended Frequency & Duration:  1 time/week for up to 6 weeks   1-2/week    Diagnosis:   1. Lumbar pain    2. Herniated disc, cervical        Chief Complaints:  No chief complaint on file.      Precautions:   Existing Precautions/Restrictions: no known precautions/restrictions    TODAY'S VISIT:    Time In Session:  Start Time: 1102  Stop Time: 1200  Time Calculation (min): 58 min   General Information - 24 1102          Session Details    Document Type re-evaluation     Mode of Treatment individual therapy        General Information    Referring Physician Uriah Walsh DO     History of present illness/functional impairment Sayda Thomason started experiencing leg numbness while running on the treadmill at the end of 2023. The numbness progressed to include bilateral hands, and those symptoms have persisted. She reports bilateral arm weakness and loss of fine motor skills. Leg numbness subsided for a while, but that has returned as well as foot weakness. Originally, the patient noted some neck pain and right scapular pain. Neck pain still comes and goes intermittently, but it is relatively mild compared to arm symptoms. Looking upward or to the side makes the neck feel tight. The patient had been going to [employer], but she transitioned to a lower level independent program. She would like to improve her upper extremity coordination and return to her previous level of exercise.     Patient/Family/Caregiver Comments/Observations The patient reports about 50% progress for the lower back and up to 70% progress for the neck.     Existing Precautions/Restrictions no known precautions/restrictions                     Daily Falls Screen - 05/14/24 1102          Daily Falls Assessment    Patient reported fall since last visit No                    Pain/Vitals - 05/14/24 1102          Pain Assessment    Currently in pain Yes     Preferred Pain Scale number (Numeric Rating Pain Scale)     Pain: Body location Back;Neck     Pain Rating (0-10): Pre Activity 5   back = 3.5, neck = 5    Pain Radiation to arm, left;arm, right;leg, left;leg, right     Pain Description intermittent        Pre Activity Vital Signs    Pulse 67     /82     BP Location Left upper arm     BP Method Automatic     Patient Position Sitting        Pain Intervention    Intervention  manual, exercise     Post Intervention Comments reduced distal symptoms                    PT - 05/14/24 1102          Physical Therapy    Physical Therapy Specialty Spine PT        PT Plan    Frequency of treatment 1 time/week     PT Duration 6 weeks     PT Custom Frequency and Duration 1-2/week     PT Cert From 05/14/24     PT Cert To 06/28/24     Date PT POC was sent to provider 05/14/24     Signed PT Plan of Care received?  No   updated 5/14                   Assessment and Plan - 05/14/24 1102          Assessment    Plan of Care reviewed and patient/family in agreement Yes     System Pathology/Pathophysiology Noted musculoskeletal;neuromuscular     Functional Limitations in Following Categories (PT Eval) home management;work;community/leisure     Rehab Potential/Prognosis good, to achieve stated therapy goals     Problem List decreased ROM;decreased strength;impaired sensation;pain     Clinical Assessment Sayda Larkin completed eight physical therapy visits for a cervical disc hernation and lumbar pain. She demonstrates improved cervical range of motion, increased right lower extremity strength, improved upper extremity coordiation, and significant changes in subjective functional outcome measures. The PSFS is not assessed today but shows overall progress. Both  arm and leg symptoms are reportedly improved since starting treatment. The patient feels a 50% positive change for the lumbar spine and up to 70% for the cervical spine. She continues to respond to manual therapy targeting lower lumbar spine mobility and piriformis tightness with a reduction in distal symptoms. Progressions of cervical exercises are used today for more improved upper extremity symptom reduction. Sayda will continue to benefit from physical therapy to address remaining impairments, further reduce symptoms, and improve overall functional level.     Plan and Recommendations Continue end range movements for the lumbar and cervical spine. Use manual therapy as needed.     Planned Services CPT 22420 Therapeutic exercises;CPT 57790 Therapeutic activities;CPT 56770 Neuromuscular Reeducation;CPT 18290 Manual therapy;CPT 56351 Hot/Cold Packs therapy;CPT 59614 Electrical stimulation UNATTENDED                     OBJECTIVE MEASUREMENTS/DATA:     ROM    Range of Motion - 05/14/24 1100          Cervical AROM    Cervical Flexion 55     Cervical Extension 70     Cervical Left SB 35   left sided pain    Cervical Right SB 35     Cervical Left Rotation 75     Cervical Right Rotation 75     Comments:  cervical retraction minimal loss        Lumbar AROM    Lumbar Flexion 100     Lumbar Extension 90     Lumbar Left      Lumbar Right SB 90   pulling    Lumbar Left Rotation 100     Lumbar Right Rotation 100     Comments in percentages        Additional ROM Measurements    Additional ROM  thoracic extension minimal to no loss                   MMT    Manual Muscle Tests - 05/14/24 1102          RIGHT: Lower Extremity Manual Muscle Test Assessment    Hip Flexion gross movement (4+/5) good plus     Hip Extension gross movement (5/5) normal     Hip Abduction gross movement (5/5) normal     Hip Adduction gross movement (4+/5) good plus     Hip Internal Rotation gross movement (4+/5) good plus     Hip External Rotation  gross movement (4+/5) good plus     Knee Flexion strength (5/5) normal     Knee Extension strength (5/5) normal        LEFT: Lower Extremity Manual Muscle Test Assessment    Hip Flexion gross movement (4+/5) good plus     Hip Extension gross movement (5/5) normal     Hip Abduction gross movement (5/5) normal     Hip Adduction gross movement (4+/5) good plus     Hip Internal Rotation gross movement (4+/5) good plus     Hip External Rotation gross movement (4+/5) good plus     Knee Flexion strength (5/5) normal     Knee Extension strength (5/5) normal                   Palpation    Palpation - 05/14/24 1102          Manual Interventions    Manual technique #1 lumbar L5-S1 CPA     Manual technique #2 B piriformis release - reduced foot paresthesia     Manual technique #3 cervical extension in supine with end range rotation                   Ortho Special Tests    Orthopedic Special Tests - 05/14/24 1100          Neurodynamic     ULTT Median Right - Positive;Left - Negative     ULTT Radial Right - Negative;Left - Negative     ULTT Ulnar Right - Negative;Left - Negative     Slump Test Right - Negative;Left - Negative     SLR Right - Negative;Left - Negative                   Outcome Measures    PT Outcome Measures - 05/14/24 1102          Subjective Outcome Measures    NDI 7/50 (14% limitation)     Oswestry (SCHUYLER) 10/50 (20% limitation)        NEW (2/6/23):  PSFS     PSFS ACTIVITY 1 laundry     PSFS ANSWER 1 7.5   as of last assessment    PSFS ACTIVITY 2 dishes     PSFS ANSWER 2 9   as of last assessment    PSFS ACTIVITY 3 typing/writing     PSFS ANSWER 3 6   as of last assessment    PSFS ACTIVITY 4 going to gym     PSFS ANSWER 4 5   as of last assessment    PSFS ACTIVITY 5 texting     PSFS ANSWER 5 7   as of last assessment    PSFS Sum of Activity Scores 34.5     PSFS Number of Activities 5     PSFS Percent Score 69 %                     ROM and MMT          3/18/2024 4/11/2024 4/25/2024    10:00 5/14/2024   PT  Cervical/Lumbar/Other ROM Measurements   AROM: Cervical Flexion 55  55 55   AROM: Cervical Extension 55       tightness CT junction  55       stiffness CT junction 70   AROM: Left Cervical SB 35       tightness on left side  35       left sided tightness 35       left sided pain   AROM: Right Cervical SB 40  40 35   AROM: Left Cervical Rotation 68  70 75   AROM: Right Cervical Rotation 68  70 75   AROM: Cervical Comments cervical retraction minimal to no loss  cervical retraction minimal loss cervical retraction minimal loss   AROM: Lumbar Flexion  100 100 100   AROM: Lumar Extension  80 90       increased lower back 90   AROM: Left Lumbar SB  100 100 100   AROM: Right Lumbar SB  90       produced R low back pain 90       increased lower back 90       pulling   AROM: Left Lumbar Rotation    100   AROM: Right Lumbar Rotation    100   AROM: Lumbar Comments    in percentages   Additional ROM  thoracic extension minimal to no loss  thoracic extension minimal to no loss thoracic extension minimal to no loss   Additional ROM  thoracic rotation no loss B  thoracic rotation no loss B    Additional ROM   SLR = 0-100 B     PT UE MMT Measurements   Right Hand  (lbs) 74 pounds      Left Hand  (lbs) 72 pounds      PT LE MMT   Right Hip Flexion  (4+/5) good plus (4+/5) good plus (4+/5) good plus   Left Hip Flexion  (5/5) normal (4+/5) good plus (4+/5) good plus   Right Hip Extension  (4+/5) good plus (4+/5) good plus (5/5) normal   Left Hip Extension  (4+/5) good plus (4+/5) good plus (5/5) normal   Right Hip ABD  (4/5) good (4+/5) good plus (5/5) normal   Left Hip ABD  (4+/5) good plus (4+/5) good plus (5/5) normal   Right Hip ADD  (4-/5) good minus (4+/5) good plus (4+/5) good plus   Left Hip ADD  (4+/5) good plus (4+/5) good plus (4+/5) good plus   Right Hip IR  (4-/5) good minus (4+/5) good plus (4+/5) good plus   Left Hip IR  (4+/5) good plus (4+/5) good plus (4+/5) good plus   Right Hip ER  (4-/5) good minus (4+/5)  good plus (4+/5) good plus   Left Hip ER  (4+/5) good plus (4+/5) good plus (4+/5) good plus   Right Knee Flexion  (5/5) normal (5/5) normal (5/5) normal   Left Knee Flexion  (5/5) normal (5/5) normal (5/5) normal   Right Knee Extension  (5/5) normal (5/5) normal (5/5) normal   Left Knee Extension  (5/5) normal (5/5) normal (5/5) normal     Outcome Measures          3/18/2024    20:00 4/11/2024    11:06 4/25/2024    10:00 5/14/2024    11:02   PT SUBJECTIVE Outcome Measures   PSFS % Score 46 %  69 % 69 %   NDI 11/50 (22% limitation)  10/50 (20% limitation) 7/50 (14% limitation)   Oswestry  13/50 (26% limitation) 11/50 (22% limitation) 10/50 (20% limitation)       Today's Treatment::    ORTHO PT FLOWSHEET    Exercises Current Session Time Completed (Y/N/G)   MODALITIES- HEAT/ICE (68637) TOTAL TIME FOR SESSION Not performed    Heat     Ice     MODALITIES - E-STIM (32137) TOTAL TIME FOR SESSION Not performed    E-stim/H-Wave     THER ACT (46773) TOTAL TIME FOR SESSION 8-22 Minutes    Assessment  See re-eval Y   Vitals/pain See note Y   Education 3/18/24: purpose of mechanical exam, results of exam, further need to investigate results of movements with HEP, plan of care with recommended frequency - patient verbalizes agreement and understanding  4/11/24: results of exam focusing on lumbar spine, justification and goal of new exercise, possibility that mechanical response will precede significant symptomatic response - patient verbalizes agreement and understanding  4/25/24: results of assessment, continued plan of care for neck and lower back - patient verbalizes agreement and understanding  5/14/24: progress made since last assessment, plan of care extension with progressions - patient verbalizes agreement and understanding Y   Body Mechanics/Work Training     GROUP (20585)  Not performed    THER EX (62176) TOTAL TIME FOR SESSION 23-37 Minutes    HEP 5/7/24: cervical retraction/extension, thoracic extension, lumbar  extension in standing or prone, neural glide, superman (printed)  5/14/24: sustained cervical extension in prone Y   CARDIOVASCULAR      UBE     STRENGTHENING     Prone I 2 x 10 over Swiss ball with cues for maintenance of cervical retraction Y   Prone shoulder ER 2 x 10 over Swiss ball with cues for maintenance of cervical retraction    Prone T     Quadruped row dog 8 x 10 sec B    Snow haley on wall 2 x 10 Y   No money     Wall slide with lift off 2 x 10 with orange TB tension    Lat pull down     Dead lift 5 reps with 10# bar and cues for cervical retraction and trunk neutral    Triceps extension (review of form) 5# weight in hip hinge with cues for maintenance of cervical retraction  10# weight with both hands overhead in standing with cues for cervical retraction         High plank with mountain climber 1 x 10 B with cues for reduced range of hip flexion    Side plank with thread the needle Patient demo    Dying bug with T-band wrap 1 x 10 B with pink band under knees, crossed to each hand         STRETCHING/ROM     Thoracic extension in sitting 2 x 10 using foam roller with feet on 6 inch block Y   Cervical retraction with self overpressure 1 x 10    Cervical retraction/extension in sitting 1 x 10 with end range rotation last 3 reps    Cervical extension in prone 60 sec hold with self OP Y   Lumbar extension in prone 2 x 10 with stretch out strap for OP Y   Static lumbar extension in prone 2 x 90 seconds @ 26 degrees on sectional mat table    Lumbar ext in standing 1 x 10 with hand support    Neural glides 2 x 10 B LE tibial neural glide in supine with stretch out strap, tennis ball under piriformis    Trial lumbar flexion in supine 4 x 10 (increased back pain) H        NEURO RE-ED (24319) TOTAL TIME FOR SESSION Not performed    Craniocervical flexion test               Taping     MANUAL (36929) TOTAL TIME FOR SESSION 8-22 Minutes    Retraction with clinician overpressure      Supine cervical retraction/ext  with traction 1 x 7 with end range rotation Y   Thoracic extension in sitting with clinician overpressure 1 x 10    Repeated lumbar extension in prone with clinician overpressure 1 x 10 (OP to L5)    L5-S1 CPA Grade IV x 2 bouts in prone Y   Trial lumbar rotation mobilization in flexion 3 x 30 sec with knees to R    B piriformis release in prone 3 on R, 1 on L x 2-3 minutes each area (on different trigger points) Y   Y = yes; N = no; G = group; NV = next visit; H = hold      Goals:   Goals        PT cervical/lumbar goals      Short Term Goals Time Frame Result Comment   Cervical extension and side bending pain free during testing, showing reduction of obstruction 4 weeks     SCHUYLER </= 18% 4 weeks     NDI </= 16% 4 weeks Met    PSFS >/= 66% 4 weeks Met    Pt will be independent with basic HEP for initial reduction of symptoms 4 weeks Met    Pt will show signs of centralization of LE symptoms (~50% reduction) 4 weeks Met    Pt will complete daily housework with minimal difficulty and no increase in symptoms  4 weeks Met         Long Term Goals Time Frame Result Comment   SCHUYLER </= 12% 8 weeks     NDI </= 10% 8 weeks     PSFS >/= 80% 8 weeks     Pt will be independent with advanced HEP for maintenance 8 weeks     Pt will have centralization of UE /LE symptoms by >/= 90% 8 weeks Improved    Pt will write, text, and type daily with minimal to no difficulty 8 weeks     Pt will return to a weight lifting program 2-3 days per week without increased symptoms 8 weeks     Pt will walk desired community distances without limit due to back or leg pain 8 weeks                         This 33 y.o. year old female presents to PT with above stated diagnosis. Physical Therapy evaluation reveals decreased ROM, decreased strength, impaired sensation, pain resulting in home management, work, community/leisure limitations. Sayda Thomason will benefit from skilled PT services to address limitation, work towards rehab and patient goals and  maximize PLOF of chosen ADLs.     Planned Services: The patient's treatment will include CPT 29354 Therapeutic exercises, CPT 78224 Therapeutic activities, CPT 39711 Neuromuscular Reeducation, CPT 91773 Manual therapy, CPT 23263 Hot/Cold Packs therapy, CPT 23320 Electrical stimulation UNATTENDED, .     Nasim Muhammad, PT

## 2024-05-14 NOTE — OP PT TREATMENT LOG
ORTHO PT FLOWSHEET    Exercises Current Session Time Completed (Y/N/G)   MODALITIES- HEAT/ICE (73600) TOTAL TIME FOR SESSION Not performed    Heat     Ice     MODALITIES - E-STIM (94571) TOTAL TIME FOR SESSION Not performed    E-stim/H-Wave     THER ACT (93039) TOTAL TIME FOR SESSION 8-22 Minutes    Assessment  See re-eval Y   Vitals/pain See note Y   Education 3/18/24: purpose of mechanical exam, results of exam, further need to investigate results of movements with HEP, plan of care with recommended frequency - patient verbalizes agreement and understanding  4/11/24: results of exam focusing on lumbar spine, justification and goal of new exercise, possibility that mechanical response will precede significant symptomatic response - patient verbalizes agreement and understanding  4/25/24: results of assessment, continued plan of care for neck and lower back - patient verbalizes agreement and understanding  5/14/24: progress made since last assessment, plan of care extension with progressions - patient verbalizes agreement and understanding Y   Body Mechanics/Work Training     GROUP (26851)  Not performed    THER EX (06380) TOTAL TIME FOR SESSION 23-37 Minutes    HEP 5/7/24: cervical retraction/extension, thoracic extension, lumbar extension in standing or prone, neural glide, superman (printed)  5/14/24: sustained cervical extension in prone Y   CARDIOVASCULAR      UBE     STRENGTHENING     Prone I 2 x 10 over Swiss ball with cues for maintenance of cervical retraction Y   Prone shoulder ER 2 x 10 over Swiss ball with cues for maintenance of cervical retraction    Prone T     Quadruped row dog 8 x 10 sec B    Snow haley on wall 2 x 10 Y   No money     Wall slide with lift off 2 x 10 with orange TB tension    Lat pull down     Dead lift 5 reps with 10# bar and cues for cervical retraction and trunk neutral    Triceps extension (review of form) 5# weight in hip hinge with cues for maintenance of cervical  retraction  10# weight with both hands overhead in standing with cues for cervical retraction         High plank with mountain climber 1 x 10 B with cues for reduced range of hip flexion    Side plank with thread the needle Patient demo    Dying bug with T-band wrap 1 x 10 B with pink band under knees, crossed to each hand         STRETCHING/ROM     Thoracic extension in sitting 2 x 10 using foam roller with feet on 6 inch block Y   Cervical retraction with self overpressure 1 x 10    Cervical retraction/extension in sitting 1 x 10 with end range rotation last 3 reps    Cervical extension in prone 60 sec hold with self OP Y   Lumbar extension in prone 2 x 10 with stretch out strap for OP Y   Static lumbar extension in prone 2 x 90 seconds @ 26 degrees on sectional mat table    Lumbar ext in standing 1 x 10 with hand support    Neural glides 2 x 10 B LE tibial neural glide in supine with stretch out strap, tennis ball under piriformis    Trial lumbar flexion in supine 4 x 10 (increased back pain) H        NEURO RE-ED (78562) TOTAL TIME FOR SESSION Not performed    Craniocervical flexion test               Taping     MANUAL (90830) TOTAL TIME FOR SESSION 8-22 Minutes    Retraction with clinician overpressure      Supine cervical retraction/ext with traction 1 x 7 with end range rotation Y   Thoracic extension in sitting with clinician overpressure 1 x 10    Repeated lumbar extension in prone with clinician overpressure 1 x 10 (OP to L5)    L5-S1 CPA Grade IV x 2 bouts in prone Y   Trial lumbar rotation mobilization in flexion 3 x 30 sec with knees to R    B piriformis release in prone 3 on R, 1 on L x 2-3 minutes each area (on different trigger points) Y   Y = yes; N = no; G = group; NV = next visit; H = hold

## 2024-05-20 ENCOUNTER — TELEPHONE (OUTPATIENT)
Dept: REGISTRATION | Facility: CLINIC | Age: 34
End: 2024-05-20
Payer: COMMERCIAL

## 2024-05-20 NOTE — TELEPHONE ENCOUNTER
offered 11a with Alberto for tomorrow 5/21/24. requested call back to [facility]

## 2024-06-04 ENCOUNTER — HOSPITAL ENCOUNTER (OUTPATIENT)
Dept: PHYSICAL THERAPY | Age: 34
Setting detail: THERAPIES SERIES
Discharge: HOME | End: 2024-06-04
Attending: FAMILY MEDICINE
Payer: COMMERCIAL

## 2024-06-04 DIAGNOSIS — M50.20 HERNIATED DISC, CERVICAL: ICD-10-CM

## 2024-06-04 DIAGNOSIS — M54.50 LUMBAR PAIN: Primary | ICD-10-CM

## 2024-06-04 PROCEDURE — 97110 THERAPEUTIC EXERCISES: CPT | Mod: GP

## 2024-06-04 PROCEDURE — 97140 MANUAL THERAPY 1/> REGIONS: CPT | Mod: GP

## 2024-06-04 NOTE — OP PT TREATMENT LOG
ORTHO PT FLOWSHEET    Exercises Current Session Time Completed (Y/N/G)   MODALITIES- HEAT/ICE (78481) TOTAL TIME FOR SESSION Not performed    Heat     Ice     MODALITIES - E-STIM (61032) TOTAL TIME FOR SESSION Not performed    E-stim/H-Wave     THER ACT (26625) TOTAL TIME FOR SESSION 0-7 Minutes    Assessment  See re-eval    Vitals/pain See note Y   Education 3/18/24: purpose of mechanical exam, results of exam, further need to investigate results of movements with HEP, plan of care with recommended frequency - patient verbalizes agreement and understanding  4/11/24: results of exam focusing on lumbar spine, justification and goal of new exercise, possibility that mechanical response will precede significant symptomatic response - patient verbalizes agreement and understanding  4/25/24: results of assessment, continued plan of care for neck and lower back - patient verbalizes agreement and understanding  5/14/24: progress made since last assessment, plan of care extension with progressions - patient verbalizes agreement and understanding    Body Mechanics/Work Training     GROUP (29349)  Not performed    THER EX (13264) TOTAL TIME FOR SESSION 23-37 Minutes    HEP 6/4/24: cervical retraction/extension, thoracic extension, lumbar extension in standing or prone, neural glide, superman, piriformis stretch - print NV Y   CARDIOVASCULAR      UBE     STRENGTHENING     Prone I 2 x 10 over Swiss ball with cues for maintenance of cervical retraction    Prone shoulder ER 2 x 10 over Swiss ball with cues for maintenance of cervical retraction    Prone T     Quadruped row dog 8 x 10 sec B    Snow haley on wall 2 x 10    No money     Wall slide with lift off 2 x 10 with orange TB tension    Lat pull down     Dead lift 5 reps with 10# bar and cues for cervical retraction and trunk neutral    Triceps extension (review of form) 5# weight in hip hinge with cues for maintenance of cervical retraction  10# weight with both hands  overhead in standing with cues for cervical retraction         High plank with mountain climber 1 x 10 B with cues for reduced range of hip flexion    Side plank with thread the needle Patient demo    Dying bug with T-band wrap 1 x 10 B with pink band under knees, crossed to each hand    Single leg RDL 1 x 10 B with 13# KB, a few reps without weight Y        STRETCHING/ROM     Thoracic extension in sitting 1 x 10 using foam roller with feet on 6 inch block Y   Cervical retraction with self overpressure 1 x 10    Cervical retraction/extension in sitting 1 x 10 with end range rotation last 3 reps Y   Cervical extension in prone 60 sec hold with self OP (increased local pain) Y - H   Theracane release B UT 1 minute B Y   Piriformis stretch in supine 2 x 30 sec B Y   Lumbar extension in prone 1 x 10 with stretch out strap for OP (starting with strap around front of waist and crossing posteriorly) Y   Static lumbar extension in prone 2 x 90 seconds @ 26 degrees on sectional mat table    Lumbar ext in standing 1 x 10 with hand support    Neural glides 2 x 10 B LE tibial neural glide in supine with stretch out strap, tennis ball under piriformis    Trial lumbar flexion in supine 4 x 10 (increased back pain) H        NEURO RE-ED (40826) TOTAL TIME FOR SESSION Not performed    Craniocervical flexion test               Taping     MANUAL (81856) TOTAL TIME FOR SESSION 23-37 Minutes    Retraction with clinician overpressure      Supine traction, retraction Traction 2 x 30 sec  Retraction with traction 1 x 10 Y  Y   Supine cervical retraction/ext with traction 1 x 7 with end range rotation Y   Thoracic extension in sitting with clinician overpressure 1 x 10    Repeated lumbar extension in prone with clinician overpressure 1 x 10 (OP to L5)    L5-S1 CPA Grade IV x 2 bouts in prone Y   Trial lumbar rotation mobilization in flexion 3 x 30 sec with knees to R    B piriformis release in prone 3 on R, 2 on L x 3 minutes each area (on  different trigger points) Y   Y = yes; N = no; G = group; NV = next visit; H = hold

## 2024-06-04 NOTE — PROGRESS NOTES
Physical Therapy Visit    PT DAILY NOTE FOR OUTPATIENT THERAPY    Patient: Sayda Thomason MRN: [id number]  : 1990 33 y.o.  Referring Physician: Uriah Walsh DO  Date of Visit: 2024    Certification Dates: 24 through 24    Diagnosis:   1. Lumbar pain    2. Herniated disc, cervical        Chief Complaints:  back pain, leg numbness, neck pain, arm symptoms, weakness    Precautions:   Existing Precautions/Restrictions: no known precautions/restrictions      TODAY'S VISIT    Time In Session:  Start Time: 1103  Stop Time: 1156 (needed to leave early)  Time Calculation (min): 53 min   History/Vitals/Pain/Encounter Info - 24 1103          Injury History/Precautions/Daily Required Info    Document Type daily treatment     Primary Therapist Alberto     Chief Complaint/Reason for Visit  back pain, leg numbness, neck pain, arm symptoms, weakness     Referring Physician Uriah Walsh DO     Existing Precautions/Restrictions no known precautions/restrictions     History of present illness/functional impairment Sayda Thomason started experiencing leg numbness while running on the treadmill at the end of 2023. The numbness progressed to include bilateral hands, and those symptoms have persisted. She reports bilateral arm weakness and loss of fine motor skills. Leg numbness subsided for a while, but that has returned as well as foot weakness. Originally, the patient noted some neck pain and right scapular pain. Neck pain still comes and goes intermittently, but it is relatively mild compared to arm symptoms. Looking upward or to the side makes the neck feel tight. The patient had been going to [employer], but she transitioned to a lower level independent program. She would like to improve her upper extremity coordination and return to her previous level of exercise.     Patient/Family/Caregiver Comments/Observations The patient arrives with neck pain but no lower back pain. The hand  coordination is better, but foot numbness is noticeable walking on the treadmill.     Patient reported fall since last visit No        Pain Assessment    Currently in pain Yes     Preferred Pain Scale number (Numeric Rating Pain Scale)     Pain: Body location Neck     Pain Rating (0-10): Pre Activity 4        Pain Intervention    Intervention  manual, exercise     Post Intervention Comments reduced neck tightness, no change in distal LE symptoms within the session        Pre Activity Vital Signs    Pulse 72     /89     BP Location Left upper arm     BP Method Automatic     Patient Position Sitting                    Daily Treatment Assessment and Plan - 06/04/24 1215          Daily Treatment Assessment and Plan    Progress toward goals Progressing     Daily Outcome Summary Following manual therapy, the patient performs unloaded lumbar extension and piriformis stretches. She does not notice an immediate change in lower extremity symptoms but sometimes feels a positive difference later in the day. Cervical stiffness is apparent today. Sustained prone cervical extension is completed, but an increase in central pain is noted. Supine extension reduces stiffness, and soft tissue restriction is addressed using the Theracane.     Plan and Recommendations Continue lumbar and cervical extension biases.                         OBJECTIVE DATA TAKEN TODAY:    None taken    Today's Treatment:    ORTHO PT FLOWSHEET    Exercises Current Session Time Completed (Y/N/G)   MODALITIES- HEAT/ICE (51855) TOTAL TIME FOR SESSION Not performed    Heat     Ice     MODALITIES - E-STIM (79199) TOTAL TIME FOR SESSION Not performed    E-stim/H-Wave     THER ACT (60646) TOTAL TIME FOR SESSION 0-7 Minutes    Assessment  See re-eval    Vitals/pain See note Y   Education 3/18/24: purpose of mechanical exam, results of exam, further need to investigate results of movements with HEP, plan of care with recommended frequency - patient verbalizes  agreement and understanding  4/11/24: results of exam focusing on lumbar spine, justification and goal of new exercise, possibility that mechanical response will precede significant symptomatic response - patient verbalizes agreement and understanding  4/25/24: results of assessment, continued plan of care for neck and lower back - patient verbalizes agreement and understanding  5/14/24: progress made since last assessment, plan of care extension with progressions - patient verbalizes agreement and understanding    Body Mechanics/Work Training     GROUP (74091)  Not performed    THER EX (58182) TOTAL TIME FOR SESSION 23-37 Minutes    HEP 6/4/24: cervical retraction/extension, thoracic extension, lumbar extension in standing or prone, neural glide, superman, piriformis stretch - print NV Y   CARDIOVASCULAR      UBE     STRENGTHENING     Prone I 2 x 10 over Swiss ball with cues for maintenance of cervical retraction    Prone shoulder ER 2 x 10 over Swiss ball with cues for maintenance of cervical retraction    Prone T     Quadruped row dog 8 x 10 sec B    Snow haley on wall 2 x 10    No money     Wall slide with lift off 2 x 10 with orange TB tension    Lat pull down     Dead lift 5 reps with 10# bar and cues for cervical retraction and trunk neutral    Triceps extension (review of form) 5# weight in hip hinge with cues for maintenance of cervical retraction  10# weight with both hands overhead in standing with cues for cervical retraction         High plank with mountain climber 1 x 10 B with cues for reduced range of hip flexion    Side plank with thread the needle Patient demo    Dying bug with T-band wrap 1 x 10 B with pink band under knees, crossed to each hand    Single leg RDL 1 x 10 B with 13# KB, a few reps without weight Y        STRETCHING/ROM     Thoracic extension in sitting 1 x 10 using foam roller with feet on 6 inch block Y   Cervical retraction with self overpressure 1 x 10    Cervical  retraction/extension in sitting 1 x 10 with end range rotation last 3 reps Y   Cervical extension in prone 60 sec hold with self OP (increased local pain) Y - H   Theracane release B UT 1 minute B Y   Piriformis stretch in supine 2 x 30 sec B Y   Lumbar extension in prone 1 x 10 with stretch out strap for OP (starting with strap around front of waist and crossing posteriorly) Y   Static lumbar extension in prone 2 x 90 seconds @ 26 degrees on sectional mat table    Lumbar ext in standing 1 x 10 with hand support    Neural glides 2 x 10 B LE tibial neural glide in supine with stretch out strap, tennis ball under piriformis    Trial lumbar flexion in supine 4 x 10 (increased back pain) H        NEURO RE-ED (48568) TOTAL TIME FOR SESSION Not performed    Craniocervical flexion test               Taping     MANUAL (04905) TOTAL TIME FOR SESSION 23-37 Minutes    Retraction with clinician overpressure      Supine traction, retraction Traction 2 x 30 sec  Retraction with traction 1 x 10 Y  Y   Supine cervical retraction/ext with traction 1 x 7 with end range rotation Y   Thoracic extension in sitting with clinician overpressure 1 x 10    Repeated lumbar extension in prone with clinician overpressure 1 x 10 (OP to L5)    L5-S1 CPA Grade IV x 2 bouts in prone Y   Trial lumbar rotation mobilization in flexion 3 x 30 sec with knees to R    B piriformis release in prone 3 on R, 2 on L x 3 minutes each area (on different trigger points) Y   Y = yes; N = no; G = group; NV = next visit; H = hold

## 2024-06-05 RX ORDER — SERTRALINE HYDROCHLORIDE 25 MG/1
25 TABLET, FILM COATED ORAL DAILY
Qty: 90 TABLET | Refills: 3 | OUTPATIENT
Start: 2024-06-05 | End: 2025-06-05

## 2024-06-05 NOTE — TELEPHONE ENCOUNTER
Duplicate Request    Sent 3/14/24-90 tabs/3RF  Next RF not due before 2/22/25.    Medicine Refill Request    Last Office Visit: 1/23/2024   Last Consult Visit: 9/26/2023  Last Telemedicine Visit: Visit date not found    Next Appointment: Visit date not found      Current Outpatient Medications:     meloxicam (MOBIC) 15 mg tablet, Take 1 tablet (15 mg total) by mouth daily. (Patient not taking: Reported on 3/18/2024), Disp: 90 tablet, Rfl: 0    norethindrone-ethin estradioL 1-35 mg-mcg (21) tablet, Take by mouth daily., Disp: , Rfl:     predniSONE (DELTASONE) 10 mg tablet, TAPER:  #5 tabs x 1 day, #4 x 1 day, #3 x 1 day, #2 x 1 day, #1 x 1 day. (Patient not taking: Reported on 3/18/2024), Disp: 15 tablet, Rfl: 0    sertraline (ZOLOFT) 25 mg tablet, Take 1 tablet (25 mg total) by mouth daily., Disp: 90 tablet, Rfl: 3      BP Readings from Last 3 Encounters:   03/06/24 (!) 149/91   01/23/24 120/78   12/14/23 120/82       Recent Lab results:  Lab Results   Component Value Date    CHOL 230 (H) 09/20/2023   ,   Lab Results   Component Value Date     09/20/2023   ,   Lab Results   Component Value Date    LDLCALC 121 (H) 09/20/2023   ,   Lab Results   Component Value Date    TRIG 44 09/20/2023        Lab Results   Component Value Date    GLUCOSE 91 09/28/2023   ,   Lab Results   Component Value Date    HGBA1C 5.0 09/28/2023         Lab Results   Component Value Date    CREATININE 0.74 09/28/2023       Lab Results   Component Value Date    TSH 5.140 (H) 09/28/2023           Lab Results   Component Value Date    HGBA1C 5.0 09/28/2023

## 2024-06-18 ENCOUNTER — HOSPITAL ENCOUNTER (OUTPATIENT)
Dept: PHYSICAL THERAPY | Age: 34
Setting detail: THERAPIES SERIES
Discharge: HOME | End: 2024-06-18
Attending: FAMILY MEDICINE
Payer: COMMERCIAL

## 2024-06-18 DIAGNOSIS — M54.50 LUMBAR PAIN: Primary | ICD-10-CM

## 2024-06-18 DIAGNOSIS — M50.20 HERNIATED DISC, CERVICAL: ICD-10-CM

## 2024-06-18 PROCEDURE — 97140 MANUAL THERAPY 1/> REGIONS: CPT | Mod: GP

## 2024-06-18 PROCEDURE — 97530 THERAPEUTIC ACTIVITIES: CPT | Mod: GP

## 2024-06-18 PROCEDURE — 97110 THERAPEUTIC EXERCISES: CPT | Mod: GP

## 2024-06-18 NOTE — PROGRESS NOTES
Physical Therapy Progress Note    [facility] Fax: [phone]    PT RE-EVALUATION FOR OUTPATIENT THERAPY    Patient: Sayda Thomason     MRN: [id number]  : 1990 33 y.o.    Referring Physician: Uriah Walsh DO  Date of Visit: 2024    New Certification Dates: 24 through 24    Recommended Frequency & Duration:  2 times/week for up to 6 weeks        Diagnosis:   1. Lumbar pain    2. Herniated disc, cervical        Chief Complaints:  Chief Complaint   Patient presents with    Pain     Difficulty Performing Work/school Tasks    Decreased recreational/play activity       Precautions:   Existing Precautions/Restrictions: no known precautions/restrictions    TODAY'S VISIT:    Time In Session:  Start Time: 1100  Stop Time: 1159  Time Calculation (min): 59 min   General Information - 24 1100          Session Details    Document Type re-evaluation     Mode of Treatment individual therapy        General Information    Referring Physician Uriah Walsh DO     History of present illness/functional impairment Sayda Thomason started experiencing leg numbness while running on the treadmill at the end of 2023. The numbness progressed to include bilateral hands, and those symptoms have persisted. She reports bilateral arm weakness and loss of fine motor skills. Leg numbness subsided for a while, but that has returned as well as foot weakness. Originally, the patient noted some neck pain and right scapular pain. Neck pain still comes and goes intermittently, but it is relatively mild compared to arm symptoms. Looking upward or to the side makes the neck feel tight. The patient had been going to [employer], but she transitioned to a lower level independent program. She would like to improve her upper extremity coordination and return to her previous level of exercise.     Patient/Family/Caregiver Comments/Observations The patient missed last week's appointment, and she notices that the  bilateral foot symptoms are slightly worse. A day or two after each appointment, she feels better symptomatically. Overall positive change is rated 70% for the cervical spine and 40% for the lumbar spine.     Existing Precautions/Restrictions no known precautions/restrictions                    Daily Falls Screen - 06/18/24 1100          Daily Falls Assessment    Patient reported fall since last visit No                    Pain/Vitals - 06/18/24 1100          Pain Assessment    Currently in pain Yes     Preferred Pain Scale number (Numeric Rating Pain Scale)     Pain: Body location Back     Pain Rating (0-10): Pre Activity 4     Pain Rating (0-10): Post Activity 3     Pain Radiation to leg, left;leg, right        Pre Activity Vital Signs    Pulse 71     /85     BP Location Left upper arm     BP Method Automatic     Patient Position Sitting        Pain Intervention    Intervention  manual, exercise     Post Intervention Comments partially reduced LE symptoms                    PT - 06/18/24 1100          Physical Therapy    Physical Therapy Specialty Spine PT        PT Plan    Frequency of treatment 2 times/week     PT Duration 6 weeks     PT Custom Frequency and Duration --     PT Cert From 06/18/24     PT Cert To 08/02/24     Date PT POC was sent to provider 06/18/24     Signed PT Plan of Care received?  No                    Assessment and Plan - 06/18/24 1210          Assessment    Plan of Care reviewed and patient/family in agreement Yes     System Pathology/Pathophysiology Noted musculoskeletal;neuromuscular     Functional Limitations in Following Categories (PT Eval) home management;work;community/leisure     Rehab Potential/Prognosis good, to achieve stated therapy goals     Problem List decreased strength;impaired sensation;pain     Clinical Assessment Sayda Larkin completed ten physical therapy visits for a cervical disc hernation and lumbar pain. She demonstrates increasing cervical range of motion,  improved lumbar spine mobility, increased right lower extremity strength, improved upper extremity coordiation, decreased peripheral neural sensitivity, and significant changes in subjective functional outcome measures. The PSFS improved to 83%. Both the SCHUYLER and NDI are gradually improving over time. Upper and lower extremity symptoms are reportedly improved since starting treatment. The patient continues to respond to manual therapy targeting lower lumbar spine mobility and piriformis tightness with a reduction in distal symptoms. Home exercises have reduced symptoms to a certain extent, but limited gains are noted recently with an independent program. A prolonged absence from therapy tends to make symptoms worse. Sayda will continue to benefit from physical therapy intervention to address remaining impairments, further reduce symptoms, and improve overall functional level.     Plan and Recommendations Continue lumbar mobilization and soft tissue mobilization as needed.     Planned Services CPT 10957 Therapeutic exercises;CPT 24498 Therapeutic activities;CPT 68878 Neuromuscular Reeducation;CPT 12608 Manual therapy;CPT 18445 Hot/Cold Packs therapy;CPT 22652 Electrical stimulation UNATTENDED                     OBJECTIVE MEASUREMENTS/DATA:     ROM    Range of Motion - 06/18/24 1100          Cervical AROM    Cervical Flexion 55     Cervical Extension 70     Cervical Left SB 40     Cervical Right SB 40     Cervical Left Rotation 80     Cervical Right Rotation 80     Comments:  cervical retraction no loss        Lumbar AROM    Lumbar Flexion 100     Lumbar Extension 90     Lumbar Left      Lumbar Right      Lumbar Left Rotation 100     Lumbar Right Rotation 100     Comments in percentages        Additional ROM Measurements    Additional ROM  thoracic extension minimal to no loss                   MMT    Manual Muscle Tests - 06/18/24 1100          RIGHT: Lower Extremity Manual Muscle Test Assessment    Hip  Flexion gross movement (4+/5) good plus     Hip Extension gross movement (5/5) normal     Hip Abduction gross movement (5/5) normal     Hip Adduction gross movement (5/5) normal     Hip Internal Rotation gross movement (5/5) normal     Hip External Rotation gross movement (4+/5) good plus     Knee Flexion strength (5/5) normal     Knee Extension strength (5/5) normal        LEFT: Lower Extremity Manual Muscle Test Assessment    Hip Flexion gross movement (4+/5) good plus     Hip Extension gross movement (5/5) normal     Hip Abduction gross movement (5/5) normal     Hip Adduction gross movement (5/5) normal     Hip Internal Rotation gross movement (5/5) normal     Hip External Rotation gross movement (4+/5) good plus     Knee Flexion strength (5/5) normal     Knee Extension strength (5/5) normal                   Palpation    Palpation - 06/18/24 1210          Palpation    LE Palpation  piriformis tenderness and restrictions bilaterally        Manual Interventions    Manual technique #1 lumbar L5-S1 CPA - reduced foot paresthesia     Manual technique #2 B piriformis release - reduced foot paresthesia                   Ortho Special Tests    Orthopedic Special Tests - 06/18/24 1200          Neurodynamic     ULTT Median Right - Negative;Left - Negative     ULTT Radial Right - Negative;Left - Negative     ULTT Ulnar Right - Negative;Left - Negative     Slump Test Right - Negative;Left - Negative     SLR Right - Negative;Left - Negative                   Outcome Measures    PT Outcome Measures - 06/18/24 1100          Subjective Outcome Measures    NDI 4/50 (8% limitation)     Oswestry (SCHUYLER) 9/50 (18% limitation)        NEW (2/6/23):  PSFS     PSFS ACTIVITY 1 laundry     PSFS ANSWER 1 10     PSFS ACTIVITY 2 dishes     PSFS ANSWER 2 10     PSFS ACTIVITY 3 typing/writing     PSFS ANSWER 3 7     PSFS ACTIVITY 4 going to gym     PSFS ANSWER 4 6.5     PSFS ACTIVITY 5 texting     PSFS ANSWER 5 8     PSFS Sum of Activity Scores  41.5     PSFS Number of Activities 5     PSFS Percent Score 83 %                     ROM and MMT          3/18/2024 4/11/2024 4/25/2024    10:00 5/14/2024 6/18/2024    11:00   PT Cervical/Lumbar/Other ROM Measurements   AROM: Cervical Flexion 55  55 55 55   AROM: Cervical Extension 55       tightness CT junction  55       stiffness CT junction 70 70   AROM: Left Cervical SB 35       tightness on left side  35       left sided tightness 35       left sided pain 40   AROM: Right Cervical SB 40  40 35 40   AROM: Left Cervical Rotation 68  70 75 80   AROM: Right Cervical Rotation 68  70 75 80   AROM: Cervical Comments cervical retraction minimal to no loss  cervical retraction minimal loss cervical retraction minimal loss cervical retraction no loss   AROM: Lumbar Flexion  100 100 100 100   AROM: Lumar Extension  80 90       increased lower back 90 90   AROM: Left Lumbar SB  100 100 100 100   AROM: Right Lumbar SB  90       produced R low back pain 90       increased lower back 90       pulling 100   AROM: Left Lumbar Rotation    100 100   AROM: Right Lumbar Rotation    100 100   AROM: Lumbar Comments    in percentages in percentages   Additional ROM  thoracic extension minimal to no loss  thoracic extension minimal to no loss thoracic extension minimal to no loss thoracic extension minimal to no loss   Additional ROM  thoracic rotation no loss B  thoracic rotation no loss B     Additional ROM   SLR = 0-100 B      PT UE MMT Measurements   Right Hand  (lbs) 74 pounds       Left Hand  (lbs) 72 pounds       PT LE MMT   Right Hip Flexion  (4+/5) good plus (4+/5) good plus (4+/5) good plus (4+/5) good plus   Left Hip Flexion  (5/5) normal (4+/5) good plus (4+/5) good plus (4+/5) good plus   Right Hip Extension  (4+/5) good plus (4+/5) good plus (5/5) normal (5/5) normal   Left Hip Extension  (4+/5) good plus (4+/5) good plus (5/5) normal (5/5) normal   Right Hip ABD  (4/5) good (4+/5) good plus (5/5) normal (5/5)  normal   Left Hip ABD  (4+/5) good plus (4+/5) good plus (5/5) normal (5/5) normal   Right Hip ADD  (4-/5) good minus (4+/5) good plus (4+/5) good plus (5/5) normal   Left Hip ADD  (4+/5) good plus (4+/5) good plus (4+/5) good plus (5/5) normal   Right Hip IR  (4-/5) good minus (4+/5) good plus (4+/5) good plus (5/5) normal   Left Hip IR  (4+/5) good plus (4+/5) good plus (4+/5) good plus (5/5) normal   Right Hip ER  (4-/5) good minus (4+/5) good plus (4+/5) good plus (4+/5) good plus   Left Hip ER  (4+/5) good plus (4+/5) good plus (4+/5) good plus (4+/5) good plus   Right Knee Flexion  (5/5) normal (5/5) normal (5/5) normal (5/5) normal   Left Knee Flexion  (5/5) normal (5/5) normal (5/5) normal (5/5) normal   Right Knee Extension  (5/5) normal (5/5) normal (5/5) normal (5/5) normal   Left Knee Extension  (5/5) normal (5/5) normal (5/5) normal (5/5) normal     Outcome Measures          3/18/2024    20:00 4/11/2024    11:06 4/25/2024    10:00 5/14/2024    11:02 6/18/2024    11:00   PT SUBJECTIVE Outcome Measures   PSFS % Score 46 %  69 % 69 % 83 %   NDI 11/50 (22% limitation)  10/50 (20% limitation) 7/50 (14% limitation) 4/50 (8% limitation)   Oswestry  13/50 (26% limitation) 11/50 (22% limitation) 10/50 (20% limitation) 9/50 (18% limitation)       Today's Treatment::    ORTHO PT FLOWSHEET    Exercises Current Session Time Completed (Y/N/G)   MODALITIES- HEAT/ICE (78296) TOTAL TIME FOR SESSION Not performed    Heat     Ice     MODALITIES - E-STIM (42462) TOTAL TIME FOR SESSION Not performed    E-stim/H-Wave     THER ACT (33317) TOTAL TIME FOR SESSION 8-22 Minutes    Assessment  See re-eval Y   Vitals/pain See note Y   Education 3/18/24: purpose of mechanical exam, results of exam, further need to investigate results of movements with HEP, plan of care with recommended frequency - patient verbalizes agreement and understanding  4/11/24: results of exam focusing on lumbar spine, justification and goal of new  exercise, possibility that mechanical response will precede significant symptomatic response - patient verbalizes agreement and understanding  4/25/24: results of assessment, continued plan of care for neck and lower back - patient verbalizes agreement and understanding  5/14/24: progress made since last assessment, plan of care extension with progressions - patient verbalizes agreement and understanding  6/18/24: progress made since start of care and since last assessment, plan of care extension - patient verbalizes agreement and understanding Y   Body Mechanics/Work Training     GROUP (22600)  Not performed    THER EX (99884) TOTAL TIME FOR SESSION 23-37 Minutes    HEP 6/18/24: cervical retraction/extension, thoracic extension, lumbar extension in standing or prone, neural glide, superman with Y, superman with shoulder ER, piriformis stretch - printed Y   CARDIOVASCULAR      UBE     STRENGTHENING     Prone Y 1 x 10 over Swiss ball with cues for maintenance of cervical retraction Y   Prone shoulder ER 1 x 10 over Swiss ball with cues for maintenance of cervical retraction Y   Prone T     Quadruped row dog 8 x 10 sec B    Snow haley on wall 2 x 10    No money     Wall slide with lift off 2 x 10 with orange TB tension    Lat pull down     Dead lift 5 reps with 10# bar and cues for cervical retraction and trunk neutral    Triceps extension (review of form) 5# weight in hip hinge with cues for maintenance of cervical retraction  10# weight with both hands overhead in standing with cues for cervical retraction         High plank with touch the sun 1 x 10 B Y   High plank with mountain climber 1 x 10 B with cues for reduced range of hip flexion    Side plank with thread the needle Patient demo    Dying bug with T-band wrap 1 x 10 B with pink band under knees, crossed to each hand    Single leg RDL 1 x 10 B with 13# KB, a few reps without weight         STRETCHING/ROM     Thoracic extension in sitting 1 x 10 using foam  roller with feet on 6 inch block Y   Cervical retraction with self overpressure 1 x 10    Cervical retraction/extension in sitting 1 x 10 with end range rotation last 3 reps Y   Cervical extension in prone 60 sec hold with self OP (increased local pain) H   Theracane release B UT 1 minute B    Piriformis stretch in supine 2 x 30 sec B NV   Lumbar extension in prone 1 x 10 with stretch out strap for OP (starting with strap around front of waist and crossing posteriorly) Y   Static lumbar extension in prone 2 x 90 seconds @ 26 degrees on sectional mat table    Lumbar ext in standing 1 x 10 with hand support    Neural glides 2 x 10 B LE tibial neural glide in supine with stretch out strap, tennis ball under piriformis    Trial lumbar flexion in supine 4 x 10 (increased back pain) H        NEURO RE-ED (59497) TOTAL TIME FOR SESSION Not performed    Craniocervical flexion test               Taping     MANUAL (62430) TOTAL TIME FOR SESSION 8-22 Minutes    Retraction with clinician overpressure      Supine traction, retraction Traction 2 x 30 sec  Retraction with traction 1 x 10      Supine cervical retraction/ext with traction 1 x 7 with end range rotation Y   Thoracic extension in sitting with clinician overpressure 1 x 10    Repeated lumbar extension in prone with clinician overpressure 1 x 10 (OP to L5)    L5-S1 CPA Grade IV x 3 bouts in prone Y   Trial lumbar rotation mobilization in flexion 3 x 30 sec with knees to R    B piriformis release in prone 2 areas each side x 3 minutes each area (on different trigger points) Y   Y = yes; N = no; G = group; NV = next visit; H = hold      Goals:   Goals        PT cervical/lumbar goals      Short Term Goals Time Frame Result Comment   Cervical extension and side bending pain free during testing, showing reduction of obstruction 4 weeks Met    SCHUYLER </= 18% 4 weeks Met    NDI </= 16% 4 weeks Met    PSFS >/= 66% 4 weeks Met    Pt will be independent with basic HEP for initial  reduction of symptoms 4 weeks Met    Pt will show signs of centralization of LE symptoms (~50% reduction) 4 weeks Met    Pt will complete daily housework with minimal difficulty and no increase in symptoms  4 weeks Met         Long Term Goals Time Frame Result Comment   SCHUYLER </= 12% 8 weeks     NDI </= 10% 8 weeks Met    PSFS >/= 80% 8 weeks Met    Pt will be independent with advanced HEP for maintenance 8 weeks     Pt will have centralization of UE /LE symptoms by >/= 90% 8 weeks Improved    Pt will write, text, and type daily with minimal to no difficulty 8 weeks Improved Rated 7/10 to 10/10 on PSFS   Pt will return to a weight lifting program 2-3 days per week without increased symptoms 8 weeks Improved Returned to some activity   Pt will walk desired community distances without limit due to back or leg pain 8 weeks                         This 33 y.o. year old female presents to PT with above stated diagnosis. Physical Therapy evaluation reveals decreased strength, impaired sensation, pain resulting in home management, work, community/leisure limitations. Sayda Thomason will benefit from skilled PT services to address limitation, work towards rehab and patient goals and maximize PLOF of chosen ADLs.     Planned Services: The patient's treatment will include CPT 07394 Therapeutic exercises, CPT 59755 Therapeutic activities, CPT 29759 Neuromuscular Reeducation, CPT 50193 Manual therapy, CPT 07529 Hot/Cold Packs therapy, CPT 91721 Electrical stimulation UNATTENDED, .     Nasim Muhammad, PT

## 2024-06-18 NOTE — OP PT TREATMENT LOG
ORTHO PT FLOWSHEET    Exercises Current Session Time Completed (Y/N/G)   MODALITIES- HEAT/ICE (57256) TOTAL TIME FOR SESSION Not performed    Heat     Ice     MODALITIES - E-STIM (12956) TOTAL TIME FOR SESSION Not performed    E-stim/H-Wave     THER ACT (32119) TOTAL TIME FOR SESSION 8-22 Minutes    Assessment  See re-eval Y   Vitals/pain See note Y   Education 3/18/24: purpose of mechanical exam, results of exam, further need to investigate results of movements with HEP, plan of care with recommended frequency - patient verbalizes agreement and understanding  4/11/24: results of exam focusing on lumbar spine, justification and goal of new exercise, possibility that mechanical response will precede significant symptomatic response - patient verbalizes agreement and understanding  4/25/24: results of assessment, continued plan of care for neck and lower back - patient verbalizes agreement and understanding  5/14/24: progress made since last assessment, plan of care extension with progressions - patient verbalizes agreement and understanding  6/18/24: progress made since start of care and since last assessment, plan of care extension - patient verbalizes agreement and understanding Y   Body Mechanics/Work Training     GROUP (27808)  Not performed    THER EX (20717) TOTAL TIME FOR SESSION 23-37 Minutes    HEP 6/18/24: cervical retraction/extension, thoracic extension, lumbar extension in standing or prone, neural glide, superman with Y, superman with shoulder ER, piriformis stretch - printed Y   CARDIOVASCULAR      UBE     STRENGTHENING     Prone Y 1 x 10 over Swiss ball with cues for maintenance of cervical retraction Y   Prone shoulder ER 1 x 10 over Swiss ball with cues for maintenance of cervical retraction Y   Prone T     Quadruped row dog 8 x 10 sec B    Snow haley on wall 2 x 10    No money     Wall slide with lift off 2 x 10 with orange TB tension    Lat pull down     Dead lift 5 reps with 10# bar and cues for  cervical retraction and trunk neutral    Triceps extension (review of form) 5# weight in hip hinge with cues for maintenance of cervical retraction  10# weight with both hands overhead in standing with cues for cervical retraction         High plank with touch the sun 1 x 10 B Y   High plank with mountain climber 1 x 10 B with cues for reduced range of hip flexion    Side plank with thread the needle Patient demo    Dying bug with T-band wrap 1 x 10 B with pink band under knees, crossed to each hand    Single leg RDL 1 x 10 B with 13# KB, a few reps without weight         STRETCHING/ROM     Thoracic extension in sitting 1 x 10 using foam roller with feet on 6 inch block Y   Cervical retraction with self overpressure 1 x 10    Cervical retraction/extension in sitting 1 x 10 with end range rotation last 3 reps Y   Cervical extension in prone 60 sec hold with self OP (increased local pain) H   Theracane release B UT 1 minute B    Piriformis stretch in supine 2 x 30 sec B NV   Lumbar extension in prone 1 x 10 with stretch out strap for OP (starting with strap around front of waist and crossing posteriorly) Y   Static lumbar extension in prone 2 x 90 seconds @ 26 degrees on sectional mat table    Lumbar ext in standing 1 x 10 with hand support    Neural glides 2 x 10 B LE tibial neural glide in supine with stretch out strap, tennis ball under piriformis    Trial lumbar flexion in supine 4 x 10 (increased back pain) H        NEURO RE-ED (46497) TOTAL TIME FOR SESSION Not performed    Craniocervical flexion test               Taping     MANUAL (14793) TOTAL TIME FOR SESSION 8-22 Minutes    Retraction with clinician overpressure      Supine traction, retraction Traction 2 x 30 sec  Retraction with traction 1 x 10      Supine cervical retraction/ext with traction 1 x 7 with end range rotation Y   Thoracic extension in sitting with clinician overpressure 1 x 10    Repeated lumbar extension in prone with clinician overpressure  1 x 10 (OP to L5)    L5-S1 CPA Grade IV x 3 bouts in prone Y   Trial lumbar rotation mobilization in flexion 3 x 30 sec with knees to R    B piriformis release in prone 2 areas each side x 3 minutes each area (on different trigger points) Y   Y = yes; N = no; G = group; NV = next visit; H = hold

## 2024-06-28 ENCOUNTER — TELEPHONE (OUTPATIENT)
Dept: REGISTRATION | Facility: CLINIC | Age: 34
End: 2024-06-28
Payer: COMMERCIAL

## 2024-06-28 NOTE — TELEPHONE ENCOUNTER
offered 1p PT appt with Alberto on Mon 7/1/24. requested call back to [facility]

## 2024-07-09 ENCOUNTER — HOSPITAL ENCOUNTER (OUTPATIENT)
Dept: PHYSICAL THERAPY | Age: 34
Setting detail: THERAPIES SERIES
Discharge: HOME | End: 2024-07-09
Attending: FAMILY MEDICINE
Payer: COMMERCIAL

## 2024-07-09 DIAGNOSIS — M54.50 LUMBAR PAIN: Primary | ICD-10-CM

## 2024-07-09 DIAGNOSIS — M50.20 HERNIATED DISC, CERVICAL: ICD-10-CM

## 2024-07-09 PROCEDURE — 97140 MANUAL THERAPY 1/> REGIONS: CPT | Mod: GP

## 2024-07-09 PROCEDURE — 97110 THERAPEUTIC EXERCISES: CPT | Mod: GP

## 2024-07-09 NOTE — OP PT TREATMENT LOG
ORTHO PT FLOWSHEET    Exercises Current Session Time Completed (Y/N/G)   MODALITIES- HEAT/ICE (78981) TOTAL TIME FOR SESSION Not performed    Heat     Ice     MODALITIES - E-STIM (33193) TOTAL TIME FOR SESSION Not performed    E-stim/H-Wave     THER ACT (69622) TOTAL TIME FOR SESSION 0-7 Minutes    Assessment  See re-eval    Vitals/pain See note Y   Education 3/18/24: purpose of mechanical exam, results of exam, further need to investigate results of movements with HEP, plan of care with recommended frequency - patient verbalizes agreement and understanding  4/11/24: results of exam focusing on lumbar spine, justification and goal of new exercise, possibility that mechanical response will precede significant symptomatic response - patient verbalizes agreement and understanding  4/25/24: results of assessment, continued plan of care for neck and lower back - patient verbalizes agreement and understanding  5/14/24: progress made since last assessment, plan of care extension with progressions - patient verbalizes agreement and understanding  6/18/24: progress made since start of care and since last assessment, plan of care extension - patient verbalizes agreement and understanding    Body Mechanics/Work Training     GROUP (76463)  Not performed    THER EX (41944) TOTAL TIME FOR SESSION 23-37 Minutes    HEP 7/9/24: cervical retraction/extension, thoracic extension, lumbar extension in standing or prone, neural glide, superman with Y, superman with shoulder ER, piriformis stretch, median neural glide - printed Y   CARDIOVASCULAR      UBE     STRENGTHENING     Prone Y 1 x 10 over Swiss ball with cues for maintenance of cervical retraction (uncomfortable on ball) Y   Prone shoulder ER with overhead reach 1 x 10 over Swiss ball with cues for maintenance of cervical retraction Y   Prone T     Quadruped row dog 8 x 10 sec B    Snow haley on wall 2 x 10    No money     Wall slide with lift off 2 x 10 with orange TB tension     Wall reach with red ball  1 x 10 with back to wall and squeeze on ball (not challenging) Y   Face pull ER 1 x 10 with 5# in each hand (cable column) Y   Dead lift 5 reps with 10# bar and cues for cervical retraction and trunk neutral    Triceps extension (review of form) 5# weight in hip hinge with cues for maintenance of cervical retraction  10# weight with both hands overhead in standing with cues for cervical retraction         High plank with touch the sun 1 x 10 B NV   High plank with mountain climber 1 x 10 B with cues for reduced range of hip flexion    Side plank with thread the needle Patient demo    Dying bug with T-band wrap 1 x 10 B with pink band under knees, crossed to each hand    Single leg RDL 1 x 10 B with 13# KB, a few reps without weight         STRETCHING/ROM     Thoracic extension in sitting 1 x 10 using foam roller with feet on 6 inch block reviewed   Cervical retraction with self overpressure 1 x 10    Cervical retraction/extension in sitting 1 x 10 with end range rotation last 3 reps reviewed   Cervical extension in prone 60 sec hold with self OP (increased local pain) H   Theracane release B UT 1 minute B    Piriformis stretch in supine 2 x 30 sec B NV   Lumbar extension in prone 1 x 10 with stretch out strap for OP (starting with strap around front of waist and crossing posteriorly) NV   Static lumbar extension in prone 2 x 90 seconds @ 26 degrees on sectional mat table    Lumbar ext in standing 1 x 10 with hand support    Neural glides 2 x 10 B LE tibial neural glide in supine with stretch out strap, tennis ball under piriformis    Median neural glide 15 each side with flat hand Y   Trial lumbar flexion in supine 4 x 10 (increased back pain) H        NEURO RE-ED (44906) TOTAL TIME FOR SESSION Not performed    Craniocervical flexion test               Taping     MANUAL (52532) TOTAL TIME FOR SESSION 8-22 Minutes    Retraction with clinician overpressure      Supine traction, retraction  Traction 2 x 30 sec  Retraction with traction 1 x 10   Y   Supine cervical retraction/ext with traction 1 x 7 with end range rotation Y   Thoracic extension in sitting with clinician overpressure 1 x 10    Repeated lumbar extension in prone with clinician overpressure 1 x 10 (OP to L5)    L5-S1 CPA Grade IV x 3 bouts in prone Y   Trial lumbar rotation mobilization in flexion 3 x 30 sec with knees to R    B piriformis release in prone 1 area B x 3 minutes each Y   Y = yes; N = no; G = group; NV = next visit; H = hold

## 2024-07-09 NOTE — PROGRESS NOTES
Physical Therapy Visit    PT DAILY NOTE FOR OUTPATIENT THERAPY    Patient: Sayda Thomason MRN: [id number]  : 1990 33 y.o.  Referring Physician: Uriah Walsh DO  Date of Visit: 2024    Certification Dates: 24 through 24    Diagnosis:   1. Lumbar pain    2. Herniated disc, cervical        Chief Complaints:  back pain, leg numbness, neck pain, arm symptoms, weakness    Precautions:   Existing Precautions/Restrictions: no known precautions/restrictions      TODAY'S VISIT    Time In Session:  Start Time: 1102  Stop Time: 1142 (pt needs to leave early)  Time Calculation (min): 40 min   History/Vitals/Pain/Encounter Info - 24 1103          Injury History/Precautions/Daily Required Info    Document Type daily treatment     Primary Therapist Alberto     Chief Complaint/Reason for Visit  back pain, leg numbness, neck pain, arm symptoms, weakness     Referring Physician Uriah Walsh DO     Existing Precautions/Restrictions no known precautions/restrictions     History of present illness/functional impairment Sayda Thomason started experiencing leg numbness while running on the treadmill at the end of 2023. The numbness progressed to include bilateral hands, and those symptoms have persisted. She reports bilateral arm weakness and loss of fine motor skills. Leg numbness subsided for a while, but that has returned as well as foot weakness. Originally, the patient noted some neck pain and right scapular pain. Neck pain still comes and goes intermittently, but it is relatively mild compared to arm symptoms. Looking upward or to the side makes the neck feel tight. The patient had been going to [employer], but she transitioned to a lower level independent program. She would like to improve her upper extremity coordination and return to her previous level of exercise.     Patient/Family/Caregiver Comments/Observations The patient reports some tingling in the hands. Lower extremity symptoms  come and go. They happen more often on the treadmill.     Patient reported fall since last visit No        Pain Assessment    Currently in pain No/Denies   UE tingling only       Pain Intervention    Intervention  manual, exercise     Post Intervention Comments reduced UE tingling                    Daily Treatment Assessment and Plan - 07/09/24 1103          Daily Treatment Assessment and Plan    Progress toward goals Progressing     Daily Outcome Summary The patient still achieves partial reduction of upper extremity symptoms with manual therapy. The home exercises are emphasized today, although end range movements are not performed during the session. A median neural glide is added to the home program to further target neural sensitivity.     Plan and Recommendations Continue to use manual therapy as needed for symptom control. Review median neural glide.                         OBJECTIVE DATA TAKEN TODAY:    None taken    Today's Treatment:    ORTHO PT FLOWSHEET    Exercises Current Session Time Completed (Y/N/G)   MODALITIES- HEAT/ICE (23118) TOTAL TIME FOR SESSION Not performed    Heat     Ice     MODALITIES - E-STIM (13549) TOTAL TIME FOR SESSION Not performed    E-stim/H-Wave     THER ACT (27999) TOTAL TIME FOR SESSION 0-7 Minutes    Assessment  See re-eval    Vitals/pain See note Y   Education 3/18/24: purpose of mechanical exam, results of exam, further need to investigate results of movements with HEP, plan of care with recommended frequency - patient verbalizes agreement and understanding  4/11/24: results of exam focusing on lumbar spine, justification and goal of new exercise, possibility that mechanical response will precede significant symptomatic response - patient verbalizes agreement and understanding  4/25/24: results of assessment, continued plan of care for neck and lower back - patient verbalizes agreement and understanding  5/14/24: progress made since last assessment, plan of care extension  with progressions - patient verbalizes agreement and understanding  6/18/24: progress made since start of care and since last assessment, plan of care extension - patient verbalizes agreement and understanding    Body Mechanics/Work Training     GROUP (92198)  Not performed    THER EX (14626) TOTAL TIME FOR SESSION 23-37 Minutes    HEP 7/9/24: cervical retraction/extension, thoracic extension, lumbar extension in standing or prone, neural glide, superman with Y, superman with shoulder ER, piriformis stretch, median neural glide - printed Y   CARDIOVASCULAR      UBE     STRENGTHENING     Prone Y 1 x 10 over Swiss ball with cues for maintenance of cervical retraction (uncomfortable on ball) Y   Prone shoulder ER with overhead reach 1 x 10 over Swiss ball with cues for maintenance of cervical retraction Y   Prone T     Quadruped row dog 8 x 10 sec B    Snow haley on wall 2 x 10    No money     Wall slide with lift off 2 x 10 with orange TB tension    Wall reach with red ball  1 x 10 with back to wall and squeeze on ball (not challenging) Y   Face pull ER 1 x 10 with 5# in each hand (cable column) Y   Dead lift 5 reps with 10# bar and cues for cervical retraction and trunk neutral    Triceps extension (review of form) 5# weight in hip hinge with cues for maintenance of cervical retraction  10# weight with both hands overhead in standing with cues for cervical retraction         High plank with touch the sun 1 x 10 B NV   High plank with mountain climber 1 x 10 B with cues for reduced range of hip flexion    Side plank with thread the needle Patient demo    Dying bug with T-band wrap 1 x 10 B with pink band under knees, crossed to each hand    Single leg RDL 1 x 10 B with 13# KB, a few reps without weight         STRETCHING/ROM     Thoracic extension in sitting 1 x 10 using foam roller with feet on 6 inch block reviewed   Cervical retraction with self overpressure 1 x 10    Cervical retraction/extension in sitting 1 x 10  with end range rotation last 3 reps reviewed   Cervical extension in prone 60 sec hold with self OP (increased local pain) H   Theracane release B UT 1 minute B    Piriformis stretch in supine 2 x 30 sec B NV   Lumbar extension in prone 1 x 10 with stretch out strap for OP (starting with strap around front of waist and crossing posteriorly) NV   Static lumbar extension in prone 2 x 90 seconds @ 26 degrees on sectional mat table    Lumbar ext in standing 1 x 10 with hand support    Neural glides 2 x 10 B LE tibial neural glide in supine with stretch out strap, tennis ball under piriformis    Median neural glide 15 each side with flat hand Y   Trial lumbar flexion in supine 4 x 10 (increased back pain) H        NEURO RE-ED (90520) TOTAL TIME FOR SESSION Not performed    Craniocervical flexion test               Taping     MANUAL (46527) TOTAL TIME FOR SESSION 8-22 Minutes    Retraction with clinician overpressure      Supine traction, retraction Traction 2 x 30 sec  Retraction with traction 1 x 10   Y   Supine cervical retraction/ext with traction 1 x 7 with end range rotation Y   Thoracic extension in sitting with clinician overpressure 1 x 10    Repeated lumbar extension in prone with clinician overpressure 1 x 10 (OP to L5)    L5-S1 CPA Grade IV x 3 bouts in prone Y   Trial lumbar rotation mobilization in flexion 3 x 30 sec with knees to R    B piriformis release in prone 1 area B x 3 minutes each Y   Y = yes; N = no; G = group; NV = next visit; H = hold

## 2024-07-30 ENCOUNTER — TELEPHONE (OUTPATIENT)
Dept: PRIMARY CARE | Facility: CLINIC | Age: 34
End: 2024-07-30
Payer: COMMERCIAL

## 2024-07-30 DIAGNOSIS — F41.9 ANXIETY: Primary | ICD-10-CM

## 2024-07-30 RX ORDER — ALPRAZOLAM 0.5 MG/1
0.5 TABLET ORAL NIGHTLY PRN
COMMUNITY
End: 2024-07-30 | Stop reason: SDUPTHER

## 2024-07-30 RX ORDER — ALPRAZOLAM 0.5 MG/1
0.5 TABLET ORAL NIGHTLY PRN
Qty: 30 TABLET | Refills: 0 | Status: SHIPPED | OUTPATIENT
Start: 2024-07-30 | End: 2025-03-18

## 2024-07-30 NOTE — TELEPHONE ENCOUNTER
Medication Request   Patient PCP: Uriah Walsh, DO  Next Office Visit: Visit date not found  Has this provider prescribed this medication before?: yes    ALPRAZolam (XANAX) 0.5 mg tablet    Preferred Pharmacy:   [place] [facility] [address]  [place] [address]  Phone: [phone] Fax: [phone]  \

## 2024-07-30 NOTE — TELEPHONE ENCOUNTER
Medicine Refill Request    Last Office Visit: 1/23/2024   Last Consult Visit: 9/26/2023  Last Telemedicine Visit: Visit date not found    Next Appointment: Visit date not found      Current Outpatient Medications:     meloxicam (MOBIC) 15 mg tablet, Take 1 tablet (15 mg total) by mouth daily. (Patient not taking: Reported on 3/18/2024), Disp: 90 tablet, Rfl: 0    norethindrone-ethin estradioL 1-35 mg-mcg (21) tablet, Take by mouth daily., Disp: , Rfl:     predniSONE (DELTASONE) 10 mg tablet, TAPER:  #5 tabs x 1 day, #4 x 1 day, #3 x 1 day, #2 x 1 day, #1 x 1 day. (Patient not taking: Reported on 3/18/2024), Disp: 15 tablet, Rfl: 0    sertraline (ZOLOFT) 25 mg tablet, Take 1 tablet (25 mg total) by mouth daily., Disp: 90 tablet, Rfl: 3      BP Readings from Last 3 Encounters:   03/06/24 (!) 149/91   01/23/24 120/78   12/14/23 120/82       Recent Lab results:  Lab Results   Component Value Date    CHOL 230 (H) 09/20/2023   ,   Lab Results   Component Value Date     09/20/2023   ,   Lab Results   Component Value Date    LDLCALC 121 (H) 09/20/2023   ,   Lab Results   Component Value Date    TRIG 44 09/20/2023        Lab Results   Component Value Date    GLUCOSE 91 09/28/2023   ,   Lab Results   Component Value Date    HGBA1C 5.0 09/28/2023         Lab Results   Component Value Date    CREATININE 0.74 09/28/2023       Lab Results   Component Value Date    TSH 5.140 (H) 09/28/2023           Lab Results   Component Value Date    HGBA1C 5.0 09/28/2023

## 2024-08-07 ENCOUNTER — DOCUMENTATION (OUTPATIENT)
Dept: PHYSICAL THERAPY | Age: 34
End: 2024-08-07
Payer: COMMERCIAL

## 2024-08-07 NOTE — PROGRESS NOTES
Physical Therapy Discharge      PT DISCHARGE NOTE FOR OUTPATIENT THERAPY    Patient: Sayda Thomason MRN: [id number]  : 1990 33 y.o.  Referring Physician: No ref. provider found  Date of Visit: 2024      Certification Dates: 24 through 24    Total Visit Count: 11    Diagnosis: No diagnosis found.    Chief Complaints:  Chief Complaint   Patient presents with    PT Discharge     The patient cancelled the last few scheduled PT appointments and has not re-scheduled a new assessment. Physical therapy will be discharged at this time. The patient may return as needed with a new order.       Precautions:         TODAY'S VISIT:    Time In Session:            PT - 24 0738          Physical Therapy    Physical Therapy Specialty Spine PT        PT Plan    Frequency of treatment 2 times/week     PT Duration 6 weeks     PT Cert From 24     PT Cert To 24     Date PT POC was sent to provider 24     Signed PT Plan of Care received?  Yes                       OBJECTIVE MEASUREMENTS/DATA:    None taken    ROM and MMT          3/18/2024 2024 2024    10:00 2024    11:00   PT Cervical/Lumbar/Other ROM Measurements   AROM: Cervical Flexion 55  55 55 55   AROM: Cervical Extension 55       tightness CT junction  55       stiffness CT junction 70 70   AROM: Left Cervical SB 35       tightness on left side  35       left sided tightness 35       left sided pain 40   AROM: Right Cervical SB 40  40 35 40   AROM: Left Cervical Rotation 68  70 75 80   AROM: Right Cervical Rotation 68  70 75 80   AROM: Cervical Comments cervical retraction minimal to no loss  cervical retraction minimal loss cervical retraction minimal loss cervical retraction no loss   AROM: Lumbar Flexion  100 100 100 100   AROM: Lumar Extension  80 90       increased lower back 90 90   AROM: Left Lumbar SB  100 100 100 100   AROM: Right Lumbar SB  90       produced R low back pain 90       increased lower  back 90       pulling 100   AROM: Left Lumbar Rotation    100 100   AROM: Right Lumbar Rotation    100 100   AROM: Lumbar Comments    in percentages in percentages   Additional ROM  thoracic extension minimal to no loss  thoracic extension minimal to no loss thoracic extension minimal to no loss thoracic extension minimal to no loss   Additional ROM  thoracic rotation no loss B  thoracic rotation no loss B     Additional ROM   SLR = 0-100 B      PT UE MMT Measurements   Right Hand  (lbs) 74 pounds       Left Hand  (lbs) 72 pounds       PT LE MMT   Right Hip Flexion  (4+/5) good plus (4+/5) good plus (4+/5) good plus (4+/5) good plus   Left Hip Flexion  (5/5) normal (4+/5) good plus (4+/5) good plus (4+/5) good plus   Right Hip Extension  (4+/5) good plus (4+/5) good plus (5/5) normal (5/5) normal   Left Hip Extension  (4+/5) good plus (4+/5) good plus (5/5) normal (5/5) normal   Right Hip ABD  (4/5) good (4+/5) good plus (5/5) normal (5/5) normal   Left Hip ABD  (4+/5) good plus (4+/5) good plus (5/5) normal (5/5) normal   Right Hip ADD  (4-/5) good minus (4+/5) good plus (4+/5) good plus (5/5) normal   Left Hip ADD  (4+/5) good plus (4+/5) good plus (4+/5) good plus (5/5) normal   Right Hip IR  (4-/5) good minus (4+/5) good plus (4+/5) good plus (5/5) normal   Left Hip IR  (4+/5) good plus (4+/5) good plus (4+/5) good plus (5/5) normal   Right Hip ER  (4-/5) good minus (4+/5) good plus (4+/5) good plus (4+/5) good plus   Left Hip ER  (4+/5) good plus (4+/5) good plus (4+/5) good plus (4+/5) good plus   Right Knee Flexion  (5/5) normal (5/5) normal (5/5) normal (5/5) normal   Left Knee Flexion  (5/5) normal (5/5) normal (5/5) normal (5/5) normal   Right Knee Extension  (5/5) normal (5/5) normal (5/5) normal (5/5) normal   Left Knee Extension  (5/5) normal (5/5) normal (5/5) normal (5/5) normal     Outcome Measures          3/18/2024    20:00 4/11/2024    11:06 4/25/2024    10:00 5/14/2024    11:02 6/18/2024     11:00   PT SUBJECTIVE Outcome Measures   PSFS % Score 46 %  69 % 69 % 83 %   NDI 11/50 (22% limitation)  10/50 (20% limitation) 7/50 (14% limitation) 4/50 (8% limitation)   Oswestry  13/50 (26% limitation) 11/50 (22% limitation) 10/50 (20% limitation) 9/50 (18% limitation)       Today's Treatment:    Education provided:  None/NA         Goals Addressed                   This Visit's Progress     PT cervical/lumbar goals        Short Term Goals Time Frame Result Comment   Cervical extension and side bending pain free during testing, showing reduction of obstruction 4 weeks Met    [place]ER </= 18% 4 weeks Met    NDI </= 16% 4 weeks Met    PSFS >/= 66% 4 weeks Met    Pt will be independent with basic HEP for initial reduction of symptoms 4 weeks Met    Pt will show signs of centralization of LE symptoms (~50% reduction) 4 weeks Met    Pt will complete daily housework with minimal difficulty and no increase in symptoms  4 weeks Met         Long Term Goals Time Frame Result Comment   SHCUYLER </= 12% 8 weeks     NDI </= 10% 8 weeks Met    PSFS >/= 80% 8 weeks Met    Pt will be independent with advanced HEP for maintenance 8 weeks     Pt will have centralization of UE /LE symptoms by >/= 90% 8 weeks Improved    Pt will write, text, and type daily with minimal to no difficulty 8 weeks Improved Rated 7/10 to 10/10 on PSFS   Pt will return to a weight lifting program 2-3 days per week without increased symptoms 8 weeks Improved Returned to some activity   Pt will walk desired community distances without limit due to back or leg pain 8 weeks       *could not be updated 8/7/24                  Discharge information for [facility]:    Reason for D/C: Patient has not returned  Was care interrupted for medical reason?: No  Did the patient demonstrate increased independence: Yes  Demonstration of independence:: [place] in HEP, Increased functional activity, Increased functional independence  Has the patient returned to productive  activity?: Yes  Increased production assessment:: Return to household activities

## 2024-08-09 RX ORDER — SERTRALINE HYDROCHLORIDE 25 MG/1
25 TABLET, FILM COATED ORAL DAILY
Qty: 90 TABLET | Refills: 1 | Status: SHIPPED | OUTPATIENT
Start: 2024-08-09 | End: 2025-01-06 | Stop reason: SDUPTHER

## 2024-08-09 NOTE — TELEPHONE ENCOUNTER
From: Sayda Thomason  To: Office of Uriah Walsh  Sent: 8/8/2024 8:40 PM EDT  Subject: Medication Renewal Request    Refills have been requested for the following medications:     sertraline (ZOLOFT) 25 mg tablet [Uriah Walsh]    Preferred pharmacy: [facility] [place] [address]  Delivery method: Pickup

## 2024-08-09 NOTE — TELEPHONE ENCOUNTER
Medicine Refill Request    Last Office Visit: 1/23/2024   Last Consult Visit: 9/26/2023  Last Telemedicine Visit: Visit date not found    Next Appointment: Visit date not found      Current Outpatient Medications:     meloxicam (MOBIC) 15 mg tablet, Take 1 tablet (15 mg total) by mouth daily. (Patient not taking: Reported on 3/18/2024), Disp: 90 tablet, Rfl: 0    ALPRAZolam (XANAX) 0.5 mg tablet, Take 1 tablet (0.5 mg total) by mouth nightly as needed for anxiety., Disp: 30 tablet, Rfl: 0    norethindrone-ethin estradioL 1-35 mg-mcg (21) tablet, Take by mouth daily., Disp: , Rfl:     predniSONE (DELTASONE) 10 mg tablet, TAPER:  #5 tabs x 1 day, #4 x 1 day, #3 x 1 day, #2 x 1 day, #1 x 1 day. (Patient not taking: Reported on 3/18/2024), Disp: 15 tablet, Rfl: 0    sertraline (ZOLOFT) 25 mg tablet, Take 1 tablet (25 mg total) by mouth daily., Disp: 90 tablet, Rfl: 3      BP Readings from Last 3 Encounters:   03/06/24 (!) 149/91   01/23/24 120/78   12/14/23 120/82       Recent Lab results:  Lab Results   Component Value Date    CHOL 230 (H) 09/20/2023   ,   Lab Results   Component Value Date     09/20/2023   ,   Lab Results   Component Value Date    LDLCALC 121 (H) 09/20/2023   ,   Lab Results   Component Value Date    TRIG 44 09/20/2023        Lab Results   Component Value Date    GLUCOSE 91 09/28/2023   ,   Lab Results   Component Value Date    HGBA1C 5.0 09/28/2023         Lab Results   Component Value Date    CREATININE 0.74 09/28/2023       Lab Results   Component Value Date    TSH 5.140 (H) 09/28/2023           Lab Results   Component Value Date    HGBA1C 5.0 09/28/2023

## 2024-08-16 RX ORDER — NORETHINDRONE ACETATE AND ETHINYL ESTRADIOL 1.5; .03 MG/1; MG/1
1 TABLET ORAL DAILY
Qty: 90 EACH | Refills: 3 | Status: SHIPPED | OUTPATIENT
Start: 2024-08-16 | End: 2025-03-18

## 2024-09-10 ENCOUNTER — TRANSCRIBE ORDERS (OUTPATIENT)
Dept: SCHEDULING | Age: 34
End: 2024-09-10

## 2024-09-10 DIAGNOSIS — R10.2 PELVIC AND PERINEAL PAIN: Primary | ICD-10-CM

## 2024-09-17 ENCOUNTER — HOSPITAL ENCOUNTER (OUTPATIENT)
Dept: RADIOLOGY | Facility: CLINIC | Age: 34
Discharge: HOME | End: 2024-09-17
Attending: OBSTETRICS & GYNECOLOGY
Payer: COMMERCIAL

## 2024-09-17 DIAGNOSIS — R10.2 PELVIC AND PERINEAL PAIN: ICD-10-CM

## 2024-09-17 PROCEDURE — 76856 US EXAM PELVIC COMPLETE: CPT

## 2025-01-06 ENCOUNTER — TELEPHONE (OUTPATIENT)
Dept: PRIMARY CARE | Facility: CLINIC | Age: 35
End: 2025-01-06
Payer: COMMERCIAL

## 2025-01-06 RX ORDER — SERTRALINE HYDROCHLORIDE 25 MG/1
25 TABLET, FILM COATED ORAL DAILY
Qty: 30 TABLET | Refills: 0 | Status: SHIPPED | OUTPATIENT
Start: 2025-01-06 | End: 2025-03-09 | Stop reason: SDUPTHER

## 2025-01-06 NOTE — TELEPHONE ENCOUNTER
Below message was sent to pt on 7/30/24. Would you like pt to be scheduled prior to handling this refill?      Yolanda Peraza MA to Sayda Thomason   [place]      7/30/24  4:11 PM  Hi Sayad,     We received your medication refill request and completed that for you through a covering provider for .      However, for future refills you will need to schedule a visit with your new provider. Please call the office in order to get your next appointment set up. Thank you     -RONNY Guerrero     Last read by Sayda Thomason at  8:41 PM on 8/8/2024.

## 2025-01-06 NOTE — TELEPHONE ENCOUNTER
Called and spoke with pt. Let her know Dr. Adam said he would send a 30 day supply of the sertraline but all other refills will require appt. Pt is away for the next 2 weeks and will cb to schedule when back in town.

## 2025-01-06 NOTE — TELEPHONE ENCOUNTER
Department: [facility] PRIMARY CARE [facility]   Clinical Pool: [facility] PRIMARY CARE [facility] CLINICAL SUPPORT P   PSR Pool: [facility] PRIMARY CARE [facility] PSR P     Currently in [place]     Medication Request   Patient PCP: Enrrique Choudhary, No Pcp  Next Office Visit: Visit date not found  Has this provider prescribed this medication before?: yes  Medication Name: ALPRAZolam (XANAX)   Medication Dose: 0.5 mg tablet   Medication Frequency: Take 1 tablet (0.5 mg total) by mouth nightly as needed for anxiety     Has this provider prescribed this medication before?: yes  Medication Name: sertraline (ZOLOFT)   Medication Dose: 25 mg tablet   Medication Frequency:  Take 1 tablet (25 mg total) by mouth daily   Preferred Pharmacy:   [facility]/pharm[facility] #[id number] [facility] [place] [facility] [address] [address] [facility]    [phone]     Please allow 2 business days for your provider to send your medication request or to reach out to discuss.                 t

## 2025-01-25 ENCOUNTER — NURSE TRIAGE (OUTPATIENT)
Dept: PRIMARY CARE | Facility: CLINIC | Age: 35
End: 2025-01-25
Payer: COMMERCIAL

## 2025-01-25 RX ORDER — OSELTAMIVIR PHOSPHATE 75 MG/1
75 CAPSULE ORAL DAILY
Qty: 7 CAPSULE | Refills: 0 | Status: SHIPPED | OUTPATIENT
Start: 2025-01-25 | End: 2025-02-01

## 2025-03-09 ENCOUNTER — NURSE TRIAGE (OUTPATIENT)
Dept: FAMILY MEDICINE | Facility: CLINIC | Age: 35
End: 2025-03-09
Payer: COMMERCIAL

## 2025-03-09 RX ORDER — SERTRALINE HYDROCHLORIDE 25 MG/1
25 TABLET, FILM COATED ORAL DAILY
Qty: 3 TABLET | Refills: 0 | Status: SHIPPED | OUTPATIENT
Start: 2025-03-09 | End: 2025-03-10 | Stop reason: SDUPTHER

## 2025-03-09 NOTE — TELEPHONE ENCOUNTER
Synopsis:    Patient reports that she ran out of Zoloft medication and needs a refill.   Advised patient that short course can be provided.   Patient would need to call back during the week to receive monthly refill.   Also needs to arrange a new patient appointment visit.   Patient verbalized understanding.     Disposition:  Information or Advice Only Call  Care Advice:  Care Advice Given       No Care Advice given for this encounter.             Orders Placed This Encounter:  Orders Placed This Encounter    sertraline (ZOLOFT) 25 mg tablet     Sig: Take 1 tablet (25 mg total) by mouth daily.     Dispense:  3 tablet     Refill:  0

## 2025-03-10 ENCOUNTER — TELEPHONE (OUTPATIENT)
Dept: FAMILY MEDICINE | Facility: CLINIC | Age: 35
End: 2025-03-10
Payer: COMMERCIAL

## 2025-03-10 RX ORDER — SERTRALINE HYDROCHLORIDE 25 MG/1
25 TABLET, FILM COATED ORAL DAILY
Qty: 30 TABLET | Refills: 1 | Status: SHIPPED | OUTPATIENT
Start: 2025-03-10 | End: 2025-03-18

## 2025-03-10 NOTE — TELEPHONE ENCOUNTER
Medication Request   Patient PCP: Former pt of Dr. Walsh  Next Office Visit: Visit date not found  Has this provider prescribed this medication before?: Yes  Medication Name: sertaline (ZOLOFT)  Medication Dose: 25 mg  Medication Frequency: 2 daily   Preferred Pharmacy:   [facility] - [place] [facility] [address] [address] [facility] [place] [address]  Phone: [phone] Fax: [phone]    Pt has no medication left.   Please allow 2 business days for your provider to send your medication request or to reach out to discuss.

## 2025-03-10 NOTE — TELEPHONE ENCOUNTER
We received a phone call from [facility] today regarding the prescription for sertraline. They received a quantity of 3 tablets and wanted to clarify. Could you please reach out to pharmacy at [phone]

## 2025-03-10 NOTE — TELEPHONE ENCOUNTER
Patient is scheduled for 3/18. She reports being out of medication and only was provided with 3 tables for sertraline and will need enough to cover her for her appointment.     Also requesting refill for Xanax 0.5 mg.     Informed her she would need to come in for appointment but the soonest she could come in was 3/18.   Please let me know if you need me to add more time in for her appointment.

## 2025-03-17 PROBLEM — M51.26 HNP (HERNIATED NUCLEUS PULPOSUS), LUMBAR: Status: RESOLVED | Noted: 2024-02-29 | Resolved: 2025-03-17

## 2025-03-17 PROBLEM — N39.0 UTI (URINARY TRACT INFECTION): Status: RESOLVED | Noted: 2023-10-06 | Resolved: 2025-03-17

## 2025-03-17 PROBLEM — O36.8390 FETAL ARRHYTHMIA AFFECTING PREGNANCY, ANTEPARTUM: Status: RESOLVED | Noted: 2021-08-10 | Resolved: 2025-03-17

## 2025-03-17 PROBLEM — Z01.818 PREOPERATIVE CLEARANCE: Status: RESOLVED | Noted: 2023-09-26 | Resolved: 2025-03-17

## 2025-03-17 PROBLEM — R07.89 ATYPICAL CHEST PAIN: Status: RESOLVED | Noted: 2024-01-23 | Resolved: 2025-03-17

## 2025-03-17 PROBLEM — O34.219 PREVIOUS CESAREAN DELIVERY AFFECTING PREGNANCY: Status: RESOLVED | Noted: 2018-10-18 | Resolved: 2025-03-17

## 2025-03-17 PROBLEM — R29.898 LEG HEAVINESS: Chronic | Status: RESOLVED | Noted: 2023-08-31 | Resolved: 2025-03-17

## 2025-03-17 PROBLEM — R53.83 OTHER FATIGUE: Status: RESOLVED | Noted: 2023-03-22 | Resolved: 2025-03-17

## 2025-03-17 PROBLEM — R22.0 LUMP OF SCALP: Chronic | Status: RESOLVED | Noted: 2023-09-11 | Resolved: 2025-03-17

## 2025-03-17 PROBLEM — J06.9 VIRAL UPPER RESPIRATORY TRACT INFECTION: Status: RESOLVED | Noted: 2024-01-23 | Resolved: 2025-03-17

## 2025-03-17 PROBLEM — L76.82 PAIN AT SURGICAL INCISION: Status: RESOLVED | Noted: 2021-11-05 | Resolved: 2025-03-17

## 2025-03-17 PROBLEM — R05.2 SUBACUTE COUGH: Status: RESOLVED | Noted: 2023-03-22 | Resolved: 2025-03-17

## 2025-03-17 PROBLEM — M48.02 CERVICAL SPINAL STENOSIS: Status: RESOLVED | Noted: 2024-02-29 | Resolved: 2025-03-17

## 2025-03-17 PROBLEM — R20.2 PARESTHESIAS: Status: RESOLVED | Noted: 2023-09-11 | Resolved: 2025-03-17

## 2025-03-17 PROBLEM — Z76.89 ENCOUNTER TO ESTABLISH CARE: Status: RESOLVED | Noted: 2023-03-22 | Resolved: 2025-03-17

## 2025-03-17 PROBLEM — M50.20 HNP (HERNIATED NUCLEUS PULPOSUS), CERVICAL: Status: RESOLVED | Noted: 2024-02-29 | Resolved: 2025-03-17

## 2025-03-17 NOTE — PROGRESS NOTES
Bob Adam DO    [facility]  [address]  [place] [address]  Phone: [phone]  Fax: [phone]     History of Present Illness     Subjective     Patient ID: Sayda Thomason is a 34 y.o. female.    Patient presents for follow-up of chronic conditions.     Past Medical/Surgical/Family/Social History       The following have been reviewed and updated as appropriate in this visit:   Allergies  Meds  Problems         Past Medical History:   Diagnosis Date    Abnormal Pap smear of cervix     Herniated disc, cervical        Past Surgical History   Procedure Laterality Date     section      ,      SECTION N/A 2021    Performed by Anish Dominguez MD at [facility] [place] [facility]    Colposcopy      Cosmetic surgery  10/2023    Abdominal plasty    Hernia repair  10/2023    Repeat c section N/A 10/19/2018    Performed by JAMAAL Mandel III, MD at [facility] [place] [facility]    [place] tooth extraction         Family History   Problem Relation Name Age of Onset    No Known Problems Biological Mother      Prostate cancer Biological Father      No Known Problems Biological Sister      No Known Problems Biological Brother      No Known Problems Maternal Grandmother      Pancreatic cancer Maternal Grandfather      No Known Problems Paternal Grandmother      No Known Problems Paternal Grandfather         Social History     Tobacco Use    Smoking status: Never    Smokeless tobacco: Never   Vaping Use    Vaping status: Never Used   Substance Use Topics    Alcohol use: Yes     Alcohol/week: 7.0 - 10.0 standard drinks of alcohol     Types: 7 - 10 Glasses of wine per week    Drug use: Never       Patient Care Team:  Bob Adam DO as PCP - General (Family Medicine)  Anish Dominguez MD as Obstetrician (Obstetrics and Gynecology)  Khalida Magaña DO as Obstetrician (Obstetrics and Gynecology)  Antonella Mandel MD as Obstetrician (Obstetrics and Gynecology)      "  Allergies and Medications       No Known Allergies      Current Outpatient Medications   Medication Sig Dispense Refill    LORazepam (ATIVAN) 0.5 mg tablet Take 1 tablet (0.5 mg total) by mouth as needed for anxiety. 30 tablet 0    sertraline (ZOLOFT) 25 mg tablet Take 1 tablet (25 mg total) by mouth daily. 90 tablet 0    sertraline (ZOLOFT) 50 mg tablet Take 1 tablet (50 mg total) by mouth daily. 90 tablet 0     No current facility-administered medications for this visit.          Review of Systems       Review of Systems   Constitutional:  Negative for chills, fatigue and fever.   HENT:  Negative for congestion, ear pain, hearing loss, rhinorrhea, sinus pain, tinnitus and trouble swallowing.    Eyes:  Negative for photophobia, pain, redness and visual disturbance.   Respiratory:  Negative for cough, chest tightness, shortness of breath and wheezing.    Cardiovascular:  Negative for chest pain, palpitations and leg swelling.   Gastrointestinal:  Negative for abdominal pain, blood in stool, constipation, diarrhea, nausea and vomiting.   Genitourinary:  Negative for decreased urine volume, difficulty urinating, dysuria, frequency and hematuria.   Musculoskeletal:  Negative for arthralgias, back pain, joint swelling and myalgias.   Neurological:  Negative for dizziness, syncope, weakness, light-headedness, numbness and headaches.        Physical Examination       Objective     Vitals:    03/18/25 1444   BP: 124/84   Pulse: 76   Resp: 16   SpO2: 99%       Pulse Readings from Last 5 Encounters:   03/18/25 76   03/06/24 86   01/23/24 95   09/26/23 77   09/11/23 80       Wt Readings from Last 5 Encounters:   03/18/25 55.7 kg (122 lb 12.8 oz)   03/06/24 54.4 kg (120 lb)   03/06/24 54.4 kg (120 lb)   02/29/24 55.8 kg (123 lb)   01/23/24 54.4 kg (120 lb)       Ht Readings from Last 5 Encounters:   03/18/25 1.651 m (5' 5\")   03/06/24 1.651 m (5' 5\")   03/06/24 1.651 m (5' 5\")   02/29/24 1.651 m (5' 5\")   01/23/24 1.651 m " "(5' 5\")       BP Readings from Last 5 Encounters:   03/18/25 124/84   03/06/24 (!) 149/91   01/23/24 120/78   12/14/23 120/82   09/26/23 116/74       BMI Readings from Last 4 Encounters:   03/18/25 20.43 kg/m²   03/06/24 19.97 kg/m²   03/06/24 19.97 kg/m²   02/29/24 20.47 kg/m²       Physical Exam  Vitals reviewed.   Constitutional:       General: She is not in acute distress.     Appearance: Normal appearance. She is not ill-appearing, toxic-appearing or diaphoretic.   HENT:      Head: Normocephalic and atraumatic.   Eyes:      Pupils: Pupils are equal, round, and reactive to light.   Cardiovascular:      Rate and Rhythm: Normal rate and regular rhythm.      Pulses: Normal pulses.      Heart sounds: Normal heart sounds. No murmur heard.     No friction rub. No gallop.   Pulmonary:      Effort: Pulmonary effort is normal. No respiratory distress.      Breath sounds: Normal breath sounds. No wheezing or rales.   Chest:      Chest wall: No tenderness.   Abdominal:      General: Abdomen is flat. Bowel sounds are normal. There is no distension.      Palpations: Abdomen is soft. There is no mass.      Tenderness: There is no abdominal tenderness. There is no guarding.   Musculoskeletal:      Right lower leg: No edema.      Left lower leg: No edema.   Neurological:      General: No focal deficit present.      Mental Status: She is alert and oriented to person, place, and time. Mental status is at baseline.          Laboratory Results     Lab Results   Component Value Date    WBC 5.63 09/28/2021    WBC 6.31 09/27/2021    WBC 5.57 09/08/2021    HGB 10.1 (L) 09/28/2021    HGB 15.4 09/27/2021    HGB 12.0 09/08/2021    HCT 29.8 (L) 09/28/2021    HCT 44.5 09/27/2021    HCT 33.9 (L) 09/08/2021    MCV 96.4 09/28/2021    MCV 94.5 09/27/2021    MCV 93.4 09/08/2021     (L) 09/28/2021     09/27/2021     (L) 09/08/2021        Lab Results   Component Value Date    GLUCOSE 91 09/28/2023    GLUCOSE 106 (H) 05/29/2018 " "   CALCIUM 8.9 05/29/2018     09/28/2023     (L) 05/29/2018    K 4.6 09/28/2023    K 3.6 05/29/2018    CO2 21 09/28/2023    CO2 22 05/29/2018    CL 98 09/28/2023     05/29/2018    BUN 15 09/28/2023    BUN 12 05/29/2018    CREATININE 0.74 09/28/2023    CREATININE 0.6 05/29/2018    ALKPHOS 42 05/29/2018    AST 32 05/29/2018    ALT 21 05/29/2018    BILITOT 0.8 05/29/2018    ALBUMIN 3.4 05/29/2018       Lab Results   Component Value Date    CHOL 230 (H) 09/20/2023     Lab Results   Component Value Date     09/20/2023     Lab Results   Component Value Date    LDLCALC 121 (H) 09/20/2023     Lab Results   Component Value Date    TRIG 44 09/20/2023       The ASCVD Risk score (Simran DK, et al., 2019) failed to calculate for the following reasons:    The 2019 ASCVD risk score is only valid for ages 40 to 79    Lab Results   Component Value Date    TSH 5.140 (H) 09/28/2023    TSH 9.330 (H) 09/20/2023     No results found for: \"FREET4\"      Lab Results   Component Value Date    HGBA1C 5.0 09/28/2023       No results found for: \"CREATUR\"  No results found for: \"MICROALBUR\"  No results found for: \"ALBCRET\"    No results found for: \"MICROALBCREA\"    No results found for: \"PSA\"    No results found for: \"HEPCAB\"    No results found for: \"MG\"     Immunization History   Administered Date(s) Administered    Influenza Vaccine Quadrivalent Preservative Free 6 Mons and Up 10/21/2018    Influenza Vaccine Recombinant Quadrivalent Pres Free 18 Yr and Up 10/21/2018    Influenza, Unspecified 02/08/2018    SARS-COV-2 (COVID-19) VACCINE, MODERNA MONOVALENT 10/21/2021, 11/21/2021    Tdap 07/21/2016, 10/09/2018         Health Maintenance Topics with due status: Overdue       Topic Date Due    Depression Screening Never done    HIV Screening Never done    Hepatitis C Screening Never done    Hepatitis B Vaccines Never done    Influenza Vaccine 08/01/2024    COVID-19 Vaccine 09/01/2024     Health Maintenance Topics with due " status: Not Due       Topic Last Completion Date    DTaP, Tdap, and Td Vaccines 10/09/2018    Cervical Cancer Screening 12/14/2023    Zoster Vaccine Not Due    RSV Vaccine Not Due     Health Maintenance Topics with due status: Aged Out       Topic Date Due    Meningococcal ACWY Aged Out    RSV <20 months Aged Out    HIB Vaccines Aged Out    IPV Vaccines Aged Out    Meningococcal B Aged Out    HPV Vaccines Aged Out    Pneumococcal Aged Out        Assessment and Plan       Assessment/Plan     Problem List Items Addressed This Visit       Anxiety - Primary (Chronic)    Current Assessment & Plan   Patient is currently maintained on sertraline 50 mg daily.    She is interested in increasing her dose but does not want to increase the strength too quickly as she had an adverse reaction to increasing her medication about a year ago.    Will increase sertraline to 62.5 mg daily.    Advised patient to follow-up in 2 months to recheck symptoms.  However, she states she is moving to [place] in the near future.  I advised that she find a new provider in [place] to continue prescribing her sertraline.    Patient has also been maintained on Xanax as needed for airplane rides.  I explained the risks and benefits of benzodiazepine therapy and she expressed she would like to continue taking benzos only as needed for airplane rides.  I prescribed her supply of lorazepam and advised her to follow-up with her next provider in [place] for future refills.    Continue regular follow-up with talk therapy.         Abnormal thyroid function test    Current Assessment & Plan   Lab Results   Component Value Date    TSH 5.140 (H) 09/28/2023    TSH 9.330 (H) 09/20/2023     Patient is maintained on Nutrafol for assistance with hair growth.  She believes this was the cause of her elevated TSH in years past.    Will obtain updated thyroid function studies and determine need for medication.         Relevant Orders    TSH w reflex FT4    Thyroid  peroxidase antibody     Other Visit Diagnoses         Routine lab draw        Relevant Orders    CBC and Differential    Comprehensive metabolic panel      Screening for lipid disorders        Relevant Orders    Lipid panel            Return in about 6 months (around 9/18/2025) for Physical Exam.    Orders Placed This Encounter   Procedures    TSH w reflex FT4     Standing Status:   Future     Number of Occurrences:   1     Expiration Date:   3/18/2026     Release to patient:   Immediate [1]    Thyroid peroxidase antibody     Standing Status:   Future     Number of Occurrences:   1     Expiration Date:   3/18/2026     Release to patient:   Immediate [1]    CBC and Differential     Standing Status:   Future     Number of Occurrences:   1     Expiration Date:   3/18/2026     Release to patient:   Immediate [1]    Comprehensive metabolic panel     Standing Status:   Future     Number of Occurrences:   1     Expiration Date:   3/18/2026     Release to patient:   Immediate [1]    Lipid panel     Standing Status:   Future     Number of Occurrences:   1     Expiration Date:   3/18/2026     Release to patient:   Immediate [1]              Bob Adam, DO    3/18/2025     Some or all of this note has been written using voice recognition software.    Please excuse any typographical errors.

## 2025-03-17 NOTE — ASSESSMENT & PLAN NOTE
Lab Results   Component Value Date    TSH 5.140 (H) 09/28/2023    TSH 9.330 (H) 09/20/2023     Patient is maintained on Nutrafol for assistance with hair growth.  She believes this was the cause of her elevated TSH in years past.    Will obtain updated thyroid function studies and determine need for medication.

## 2025-03-17 NOTE — ASSESSMENT & PLAN NOTE
Patient is currently maintained on sertraline 50 mg daily.    She is interested in increasing her dose but does not want to increase the strength too quickly as she had an adverse reaction to increasing her medication about a year ago.    Will increase sertraline to 62.5 mg daily.    Advised patient to follow-up in 2 months to recheck symptoms.  However, she states she is moving to [place] in the near future.  I advised that she find a new provider in [place] to continue prescribing her sertraline.    Patient has also been maintained on Xanax as needed for airplane rides.  I explained the risks and benefits of benzodiazepine therapy and she expressed she would like to continue taking benzos only as needed for airplane rides.  I prescribed her supply of lorazepam and advised her to follow-up with her next provider in [place] for future refills.    Continue regular follow-up with talk therapy.

## 2025-03-18 ENCOUNTER — OFFICE VISIT (OUTPATIENT)
Dept: PRIMARY CARE | Facility: CLINIC | Age: 35
End: 2025-03-18
Payer: COMMERCIAL

## 2025-03-18 VITALS
BODY MASS INDEX: 20.46 KG/M2 | RESPIRATION RATE: 16 BRPM | SYSTOLIC BLOOD PRESSURE: 124 MMHG | DIASTOLIC BLOOD PRESSURE: 84 MMHG | OXYGEN SATURATION: 99 % | HEIGHT: 65 IN | HEART RATE: 76 BPM | WEIGHT: 122.8 LBS

## 2025-03-18 DIAGNOSIS — R94.6 ABNORMAL THYROID FUNCTION TEST: ICD-10-CM

## 2025-03-18 DIAGNOSIS — F41.9 ANXIETY: Primary | Chronic | ICD-10-CM

## 2025-03-18 DIAGNOSIS — Z13.220 SCREENING FOR LIPID DISORDERS: ICD-10-CM

## 2025-03-18 DIAGNOSIS — Z01.89 ROUTINE LAB DRAW: ICD-10-CM

## 2025-03-18 PROCEDURE — 99214 OFFICE O/P EST MOD 30 MIN: CPT

## 2025-03-18 PROCEDURE — 3008F BODY MASS INDEX DOCD: CPT

## 2025-03-18 RX ORDER — SERTRALINE HYDROCHLORIDE 50 MG/1
50 TABLET, FILM COATED ORAL DAILY
Qty: 90 TABLET | Refills: 0 | Status: SHIPPED | OUTPATIENT
Start: 2025-03-18

## 2025-03-18 RX ORDER — LORAZEPAM 0.5 MG/1
0.5 TABLET ORAL AS NEEDED
Qty: 30 TABLET | Refills: 0 | Status: SHIPPED | OUTPATIENT
Start: 2025-03-18

## 2025-03-18 RX ORDER — SERTRALINE HYDROCHLORIDE 25 MG/1
25 TABLET, FILM COATED ORAL DAILY
Qty: 90 TABLET | Refills: 0 | Status: SHIPPED | OUTPATIENT
Start: 2025-03-18 | End: 2025-06-16

## 2025-03-18 ASSESSMENT — ENCOUNTER SYMPTOMS
HEADACHES: 0
NUMBNESS: 0
SHORTNESS OF BREATH: 0
EYE PAIN: 0
CONSTIPATION: 0
ABDOMINAL PAIN: 0
VOMITING: 0
BLOOD IN STOOL: 0
PALPITATIONS: 0
FREQUENCY: 0
BACK PAIN: 0
CHILLS: 0
DIFFICULTY URINATING: 0
DIARRHEA: 0
DYSURIA: 0
MYALGIAS: 0
JOINT SWELLING: 0
SINUS PAIN: 0
LIGHT-HEADEDNESS: 0
FEVER: 0
CHEST TIGHTNESS: 0
WHEEZING: 0
DIZZINESS: 0
WEAKNESS: 0
FATIGUE: 0
HEMATURIA: 0
COUGH: 0
EYE REDNESS: 0
PHOTOPHOBIA: 0
NAUSEA: 0
TROUBLE SWALLOWING: 0
RHINORRHEA: 0
ARTHRALGIAS: 0

## 2025-03-18 NOTE — PATIENT INSTRUCTIONS
It was a pleasure seeing you in the office today!     You are due for routine labs at this time.    Please get these done at your earliest convenience.    Please continue to take your medications as prescribed.    If you have trouble remembering the names of your medications, please write down the name, dose, and how often you take your medications on a piece of paper and place it in your wallet, and bring it with you to every doctor's visit/hospital visit.     If you have any non-urgent questions or concerns, please feel free to reach out to me on the patient portal, and I will try to get back to you in a timely manner. For more urgent matters, please call the office, and I will get back to you as soon as I can.     Remember to eat a healthy diet with focus on vegetables, fruits, and lean meats. Aim for regular aerobic exercise (e.g. water aerobics/swimming, jogging, cycling) for at least 150 minutes per week to help protect your heart and 2 days/week of strengthening exercise to help strengthen your bones.

## 2025-06-18 ENCOUNTER — TELEPHONE (OUTPATIENT)
Dept: PRIMARY CARE | Facility: CLINIC | Age: 35
End: 2025-06-18
Payer: COMMERCIAL

## 2025-06-18 NOTE — TELEPHONE ENCOUNTER
Medication Request   Patient PCP: Bob Adam, DO  Next Office Visit: Visit date not found  Has this provider prescribed this medication before?: yes  Medication Name: zolfot  Medication Dose: 50 mg  Medication Frequency: once a day  Preferred Pharmacy:   [facility] - [place] [facility] [address] [address] [place] [address]  Phone: [phone] Fax: [phone]      Please allow 2 business days for your provider to send your medication request or to reach out to discuss.

## 2025-06-19 RX ORDER — SERTRALINE HYDROCHLORIDE 50 MG/1
50 TABLET, FILM COATED ORAL DAILY
Qty: 90 TABLET | Refills: 0 | Status: SHIPPED | OUTPATIENT
Start: 2025-06-19

## 2025-06-19 NOTE — TELEPHONE ENCOUNTER
Spoke with patient who reports she did not increase her sertraline to 62.5 mg but rather stated 50 mg and feels her anxiety is adequately controlled at this strength.     Provided refill for sertraline 50 mg daily.

## 2025-06-19 NOTE — TELEPHONE ENCOUNTER
Dr Adam, Pt requesting refill: Zoloft 50 mg and then called stating she needs meds by 6/20/25 (see prior note). So, seems to be a discrepancy of dosage pt is to take: 50 mg, or 50 mg + 25 mg or 62.5 mg?    Record shows that last OV note (3/18/25) by you, states:  Will increase sertraline to 62.5 mg daily.     Advised patient to follow-up in 2 months to recheck symptoms.  However, she states she is moving to [place] in the near future.  I advised that she find a new provider in [place] to continue prescribing her sertraline.    That OV Med List:    sertraline HCl   50 mg oral Daily    25 mg oral Daily    Please order refill of whichever dosage needed.     -------------------------------------------------------------    Medicine Refill Request    Last Office Visit: 3/18/2025   Last Consult Visit: 9/26/2023  Last Telemedicine Visit: Visit date not found    Next Appointment: Visit date not found      Current Outpatient Medications:     LORazepam (ATIVAN) 0.5 mg tablet, Take 1 tablet (0.5 mg total) by mouth as needed for anxiety., Disp: 30 tablet, Rfl: 0    sertraline (ZOLOFT) 25 mg tablet, Take 1 tablet (25 mg total) by mouth daily., Disp: 90 tablet, Rfl: 0    sertraline (ZOLOFT) 50 mg tablet, Take 1 tablet (50 mg total) by mouth daily., Disp: 90 tablet, Rfl: 0    BP Readings from Last 3 Encounters:   03/18/25 124/84   03/06/24 (!) 149/91   01/23/24 120/78       Recent Lab results:  Lab Results   Component Value Date    CHOL 230 (H) 09/20/2023   ,   Lab Results   Component Value Date     09/20/2023   ,   Lab Results   Component Value Date    LDLCALC 121 (H) 09/20/2023   ,   Lab Results   Component Value Date    TRIG 44 09/20/2023        Lab Results   Component Value Date    GLUCOSE 91 09/28/2023   ,   Lab Results   Component Value Date    HGBA1C 5.0 09/28/2023         Lab Results   Component Value Date    CREATININE 0.74 09/28/2023       Lab Results   Component Value Date    TSH 5.140 (H) 09/28/2023            Lab Results   Component Value Date    HGBA1C 5.0 09/28/2023

## 2025-06-20 RX ORDER — SERTRALINE HYDROCHLORIDE 50 MG/1
50 TABLET, FILM COATED ORAL DAILY
Qty: 90 TABLET | Refills: 3 | OUTPATIENT
Start: 2025-06-20

## 2025-06-20 NOTE — TELEPHONE ENCOUNTER
Medicine Refill Request    Last Office Visit: 3/18/2025   Last Consult Visit: 9/26/2023  Last Telemedicine Visit: Visit date not found    Next Appointment: Visit date not found      Current Outpatient Medications:     LORazepam (ATIVAN) 0.5 mg tablet, Take 1 tablet (0.5 mg total) by mouth as needed for anxiety., Disp: 30 tablet, Rfl: 0    sertraline (ZOLOFT) 50 mg tablet, Take 1 tablet (50 mg total) by mouth daily., Disp: 90 tablet, Rfl: 0    BP Readings from Last 3 Encounters:   03/18/25 124/84   03/06/24 (!) 149/91   01/23/24 120/78       Recent Lab results:  Lab Results   Component Value Date    CHOL 230 (H) 09/20/2023   ,   Lab Results   Component Value Date     09/20/2023   ,   Lab Results   Component Value Date    LDLCALC 121 (H) 09/20/2023   ,   Lab Results   Component Value Date    TRIG 44 09/20/2023        Lab Results   Component Value Date    GLUCOSE 91 09/28/2023   ,   Lab Results   Component Value Date    HGBA1C 5.0 09/28/2023         Lab Results   Component Value Date    CREATININE 0.74 09/28/2023       Lab Results   Component Value Date    TSH 5.140 (H) 09/28/2023           Lab Results   Component Value Date    HGBA1C 5.0 09/28/2023

## (undated) DEVICE — DRESSING ABD STER LGE 8X10

## (undated) DEVICE — Device

## (undated) DEVICE — ***USE 138714*** SUTURE VICRYL 1 J341H CT-1

## (undated) DEVICE — ***USE 56952*** SUTURE VICRYL 1 J371H CTX 36IN VIOLET

## (undated) DEVICE — PENCIL ESU HANDSWITCHING W/HOL

## (undated) DEVICE — SUTURE VICRYL 3-0 RAPIDE 36"

## (undated) DEVICE — TUBING SMOKE EVAC PENCIL COATED

## (undated) DEVICE — STERISTRIP 1/2INX4IN

## (undated) DEVICE — ***USE 138703*** SUTURE CHROMIC GUT  0   802H

## (undated) DEVICE — ***USE 56892*** SUTURE CHROMIC GUT 1-0  905H

## (undated) DEVICE — ***USE 56895*** SUTURE CHROMIC GUT 3-0 636H

## (undated) DEVICE — ***USE 56891*** SUTURE CHROMIC GUT  0   812H

## (undated) DEVICE — SPONGE LAP 18X18 SAFE-T RFID ENHANCED XRAY

## (undated) DEVICE — BLADE SCALPEL #10

## (undated) DEVICE — MANIFOLD FOUR PORT NEPTUNE

## (undated) DEVICE — PAD GROUND ELECTROSURGICAL W/CORD

## (undated) DEVICE — SPONGE CURITY GAUZE 4

## (undated) DEVICE — COMPOUND BENZOIN TINCTURE

## (undated) DEVICE — SPONGE LAP DISPOSABLE 18X18

## (undated) DEVICE — SUTURE MONOCRYL 4-0 Y494G

## (undated) DEVICE — ***USE 121401***PACK DELIVERY C-SECTION CUSTOM

## (undated) DEVICE — DRESSING TELFA 3X8

## (undated) DEVICE — PACK RFID MLH DELIVERY STANDARDIZED

## (undated) DEVICE — APPLICATOR CHLORAPREP 26ML ORANGE TINT

## (undated) DEVICE — STERILE BLOOD COLLECTION TUBES

## (undated) DEVICE — SYRINGE BULB 60CC

## (undated) DEVICE — CONTAINER SPECIMEN STERILE 5OZ

## (undated) DEVICE — SUTURE MONOCRYL 4-0 Y426H PS-2 27IN

## (undated) DEVICE — DRESSING COMBINE 5X9 STERILE

## (undated) DEVICE — SUTURE CHROMIC GUT 3-0  800H